# Patient Record
Sex: FEMALE | Race: WHITE | NOT HISPANIC OR LATINO | Employment: OTHER | URBAN - METROPOLITAN AREA
[De-identification: names, ages, dates, MRNs, and addresses within clinical notes are randomized per-mention and may not be internally consistent; named-entity substitution may affect disease eponyms.]

---

## 2017-01-18 ENCOUNTER — RECORDS - HEALTHEAST (OUTPATIENT)
Dept: LAB | Facility: CLINIC | Age: 77
End: 2017-01-18

## 2017-01-18 LAB
CHOLEST SERPL-MCNC: 129 MG/DL
FASTING STATUS PATIENT QL REPORTED: ABNORMAL
HDLC SERPL-MCNC: 37 MG/DL
LDLC SERPL CALC-MCNC: 60 MG/DL
TRIGL SERPL-MCNC: 159 MG/DL

## 2017-03-27 ENCOUNTER — COMMUNICATION - HEALTHEAST (OUTPATIENT)
Dept: PHYSICAL MEDICINE AND REHAB | Facility: CLINIC | Age: 77
End: 2017-03-27

## 2017-03-27 DIAGNOSIS — M48.02 FORAMINAL STENOSIS OF CERVICAL REGION: ICD-10-CM

## 2017-03-27 DIAGNOSIS — M54.2 NECK PAIN: ICD-10-CM

## 2017-03-27 DIAGNOSIS — M50.30 DEGENERATION OF CERVICAL DISC WITHOUT MYELOPATHY: ICD-10-CM

## 2017-03-27 DIAGNOSIS — M79.18 MYOFASCIAL PAIN: ICD-10-CM

## 2017-03-27 DIAGNOSIS — M54.12 CERVICAL RADICULAR PAIN: ICD-10-CM

## 2018-07-18 ENCOUNTER — RECORDS - HEALTHEAST (OUTPATIENT)
Dept: LAB | Facility: CLINIC | Age: 78
End: 2018-07-18

## 2018-07-18 LAB
ALBUMIN SERPL-MCNC: 4.1 G/DL (ref 3.5–5)
ALP SERPL-CCNC: 111 U/L (ref 45–120)
ALT SERPL W P-5'-P-CCNC: 13 U/L (ref 0–45)
ANION GAP SERPL CALCULATED.3IONS-SCNC: 12 MMOL/L (ref 5–18)
AST SERPL W P-5'-P-CCNC: 14 U/L (ref 0–40)
BILIRUB SERPL-MCNC: 1.3 MG/DL (ref 0–1)
BUN SERPL-MCNC: 14 MG/DL (ref 8–28)
CALCIUM SERPL-MCNC: 10 MG/DL (ref 8.5–10.5)
CHLORIDE BLD-SCNC: 102 MMOL/L (ref 98–107)
CHOLEST SERPL-MCNC: 128 MG/DL
CO2 SERPL-SCNC: 22 MMOL/L (ref 22–31)
CREAT SERPL-MCNC: 0.79 MG/DL (ref 0.6–1.1)
FASTING STATUS PATIENT QL REPORTED: ABNORMAL
GFR SERPL CREATININE-BSD FRML MDRD: >60 ML/MIN/1.73M2
GLUCOSE BLD-MCNC: 133 MG/DL (ref 70–125)
HDLC SERPL-MCNC: 39 MG/DL
LDLC SERPL CALC-MCNC: 58 MG/DL
POTASSIUM BLD-SCNC: 4.7 MMOL/L (ref 3.5–5)
PROT SERPL-MCNC: 6.7 G/DL (ref 6–8)
SODIUM SERPL-SCNC: 136 MMOL/L (ref 136–145)
TRIGL SERPL-MCNC: 156 MG/DL
VIT B12 SERPL-MCNC: 965 PG/ML (ref 213–816)

## 2019-04-10 ENCOUNTER — RECORDS - HEALTHEAST (OUTPATIENT)
Dept: ADMINISTRATIVE | Facility: OTHER | Age: 79
End: 2019-04-10

## 2019-04-16 ENCOUNTER — HOSPITAL ENCOUNTER (OUTPATIENT)
Dept: NUCLEAR MEDICINE | Facility: HOSPITAL | Age: 79
Discharge: HOME | End: 2019-04-16
Attending: FAMILY MEDICINE

## 2019-04-16 ENCOUNTER — HOSPITAL ENCOUNTER (OUTPATIENT)
Dept: CARDIOLOGY | Facility: HOSPITAL | Age: 79
Discharge: HOME | End: 2019-04-16
Attending: FAMILY MEDICINE

## 2019-04-16 ENCOUNTER — COMMUNICATION - HEALTHEAST (OUTPATIENT)
Dept: TELEHEALTH | Facility: CLINIC | Age: 79
End: 2019-04-16

## 2019-04-16 DIAGNOSIS — R07.9 CHEST PAIN ON EXERTION: ICD-10-CM

## 2019-04-16 DIAGNOSIS — R06.02 SOB (SHORTNESS OF BREATH): ICD-10-CM

## 2019-04-16 LAB
CV STRESS CURRENT BP HE: NORMAL
CV STRESS CURRENT HR HE: 102
CV STRESS CURRENT HR HE: 103
CV STRESS CURRENT HR HE: 103
CV STRESS CURRENT HR HE: 104
CV STRESS CURRENT HR HE: 104
CV STRESS CURRENT HR HE: 105
CV STRESS CURRENT HR HE: 106
CV STRESS CURRENT HR HE: 106
CV STRESS CURRENT HR HE: 107
CV STRESS CURRENT HR HE: 108
CV STRESS CURRENT HR HE: 108
CV STRESS CURRENT HR HE: 109
CV STRESS CURRENT HR HE: 109
CV STRESS CURRENT HR HE: 80
CV STRESS CURRENT HR HE: 83
CV STRESS CURRENT HR HE: 99
CV STRESS DEVIATION TIME HE: NORMAL
CV STRESS ECHO PERCENT HR HE: NORMAL
CV STRESS EXERCISE STAGE HE: NORMAL
CV STRESS FINAL RESTING BP HE: NORMAL
CV STRESS FINAL RESTING HR HE: 99
CV STRESS MAX HR HE: 109
CV STRESS MAX TREADMILL GRADE HE: 0
CV STRESS MAX TREADMILL SPEED HE: 0
CV STRESS PEAK DIA BP HE: NORMAL
CV STRESS PEAK SYS BP HE: NORMAL
CV STRESS PHASE HE: NORMAL
CV STRESS PROTOCOL HE: NORMAL
CV STRESS RESTING PT POSITION HE: NORMAL
CV STRESS ST DEVIATION AMOUNT HE: NORMAL
CV STRESS ST DEVIATION ELEVATION HE: NORMAL
CV STRESS ST EVELATION AMOUNT HE: NORMAL
CV STRESS TEST TYPE HE: NORMAL
CV STRESS TOTAL STAGE TIME MIN 1 HE: NORMAL
NUC STRESS EJECTION FRACTION: 73 %
STRESS ECHO BASELINE BP: NORMAL
STRESS ECHO BASELINE HR: 81
STRESS ECHO CALCULATED PERCENT HR: 77 %
STRESS ECHO LAST STRESS BP: NORMAL
STRESS ECHO LAST STRESS HR: 107

## 2019-04-16 RX ORDER — BACLOFEN 10 MG/1
10 TABLET ORAL AT BEDTIME
Status: SHIPPED | COMMUNITY
Start: 2019-04-16

## 2019-04-16 RX ORDER — ACETAMINOPHEN 500 MG
1000 TABLET ORAL 2 TIMES DAILY PRN
Status: SHIPPED | COMMUNITY
Start: 2017-08-17

## 2019-04-16 RX ORDER — METFORMIN HCL 500 MG
TABLET, EXTENDED RELEASE 24 HR ORAL
Refills: 1 | Status: SHIPPED | COMMUNITY
Start: 2019-02-21

## 2019-04-16 RX ORDER — ALLOPURINOL 100 MG/1
100 TABLET ORAL DAILY
Status: SHIPPED | COMMUNITY
Start: 2019-04-16

## 2019-04-16 RX ORDER — LORAZEPAM 0.5 MG/1
0.5 TABLET ORAL 2 TIMES DAILY PRN
Status: SHIPPED | COMMUNITY
Start: 2019-04-16

## 2019-04-16 RX ORDER — CELECOXIB 200 MG/1
200 CAPSULE ORAL DAILY
Status: SHIPPED | COMMUNITY
Start: 2019-04-16

## 2019-04-18 ENCOUNTER — RECORDS - HEALTHEAST (OUTPATIENT)
Dept: ADMINISTRATIVE | Facility: OTHER | Age: 79
End: 2019-04-18

## 2019-04-23 ENCOUNTER — OFFICE VISIT - HEALTHEAST (OUTPATIENT)
Dept: CARDIOLOGY | Facility: CLINIC | Age: 79
End: 2019-04-23

## 2019-04-23 DIAGNOSIS — R94.39 ABNORMAL NUCLEAR STRESS TEST: ICD-10-CM

## 2019-04-23 DIAGNOSIS — I10 ESSENTIAL HYPERTENSION: ICD-10-CM

## 2019-04-23 DIAGNOSIS — E78.2 MIXED HYPERLIPIDEMIA: ICD-10-CM

## 2019-04-23 ASSESSMENT — MIFFLIN-ST. JEOR: SCORE: 1057.38

## 2019-05-31 ENCOUNTER — RECORDS - HEALTHEAST (OUTPATIENT)
Dept: ADMINISTRATIVE | Facility: OTHER | Age: 79
End: 2019-05-31

## 2019-05-31 ENCOUNTER — AMBULATORY - HEALTHEAST (OUTPATIENT)
Dept: CARDIOLOGY | Facility: CLINIC | Age: 79
End: 2019-05-31

## 2019-06-03 ENCOUNTER — OFFICE VISIT - HEALTHEAST (OUTPATIENT)
Dept: CARDIOLOGY | Facility: CLINIC | Age: 79
End: 2019-06-03

## 2019-06-03 DIAGNOSIS — R94.39 ABNORMAL NUCLEAR STRESS TEST: ICD-10-CM

## 2019-06-03 DIAGNOSIS — I25.84 CORONARY ARTERY DISEASE DUE TO CALCIFIED CORONARY LESION: ICD-10-CM

## 2019-06-03 DIAGNOSIS — I10 ESSENTIAL HYPERTENSION: ICD-10-CM

## 2019-06-03 DIAGNOSIS — E78.2 MIXED HYPERLIPIDEMIA: ICD-10-CM

## 2019-06-03 DIAGNOSIS — I25.10 CORONARY ARTERY DISEASE DUE TO CALCIFIED CORONARY LESION: ICD-10-CM

## 2019-06-03 ASSESSMENT — MIFFLIN-ST. JEOR: SCORE: 1044.68

## 2020-07-28 ENCOUNTER — COMMUNICATION - HEALTHEAST (OUTPATIENT)
Dept: SCHEDULING | Facility: CLINIC | Age: 80
End: 2020-07-28

## 2020-07-31 ENCOUNTER — RECORDS - HEALTHEAST (OUTPATIENT)
Dept: LAB | Facility: CLINIC | Age: 80
End: 2020-07-31

## 2020-07-31 LAB
ALBUMIN SERPL-MCNC: 4.3 G/DL (ref 3.5–5)
ALP SERPL-CCNC: 94 U/L (ref 45–120)
ALT SERPL W P-5'-P-CCNC: 17 U/L (ref 0–45)
ANION GAP SERPL CALCULATED.3IONS-SCNC: 11 MMOL/L (ref 5–18)
AST SERPL W P-5'-P-CCNC: 17 U/L (ref 0–40)
BILIRUB SERPL-MCNC: 1.8 MG/DL (ref 0–1)
BUN SERPL-MCNC: 10 MG/DL (ref 8–28)
CALCIUM SERPL-MCNC: 10.2 MG/DL (ref 8.5–10.5)
CHLORIDE BLD-SCNC: 103 MMOL/L (ref 98–107)
CHOLEST SERPL-MCNC: 121 MG/DL
CO2 SERPL-SCNC: 23 MMOL/L (ref 22–31)
CREAT SERPL-MCNC: 0.77 MG/DL (ref 0.6–1.1)
FASTING STATUS PATIENT QL REPORTED: ABNORMAL
GFR SERPL CREATININE-BSD FRML MDRD: >60 ML/MIN/1.73M2
GLUCOSE BLD-MCNC: 125 MG/DL (ref 70–125)
HDLC SERPL-MCNC: 42 MG/DL
LDLC SERPL CALC-MCNC: 57 MG/DL
POTASSIUM BLD-SCNC: 4.4 MMOL/L (ref 3.5–5)
PROT SERPL-MCNC: 6.7 G/DL (ref 6–8)
SODIUM SERPL-SCNC: 137 MMOL/L (ref 136–145)
TRIGL SERPL-MCNC: 108 MG/DL
VIT B12 SERPL-MCNC: 1100 PG/ML (ref 213–816)

## 2021-02-10 ENCOUNTER — RECORDS - HEALTHEAST (OUTPATIENT)
Dept: LAB | Facility: CLINIC | Age: 81
End: 2021-02-10

## 2021-02-12 LAB — BACTERIA SPEC CULT: NORMAL

## 2021-05-02 ENCOUNTER — HEALTH MAINTENANCE LETTER (OUTPATIENT)
Age: 81
End: 2021-05-02

## 2021-05-26 ENCOUNTER — RECORDS - HEALTHEAST (OUTPATIENT)
Dept: ADMINISTRATIVE | Facility: CLINIC | Age: 81
End: 2021-05-26

## 2021-05-26 ENCOUNTER — RECORDS - HEALTHEAST (OUTPATIENT)
Dept: LAB | Facility: CLINIC | Age: 81
End: 2021-05-26

## 2021-05-26 LAB
ALBUMIN SERPL-MCNC: 4.1 G/DL (ref 3.5–5)
ALP SERPL-CCNC: 87 U/L (ref 45–120)
ALT SERPL W P-5'-P-CCNC: 14 U/L (ref 0–45)
ANION GAP SERPL CALCULATED.3IONS-SCNC: 11 MMOL/L (ref 5–18)
AST SERPL W P-5'-P-CCNC: 16 U/L (ref 0–40)
BASOPHILS # BLD AUTO: 0.1 THOU/UL (ref 0–0.2)
BASOPHILS NFR BLD AUTO: 1 % (ref 0–2)
BILIRUB SERPL-MCNC: 2.4 MG/DL (ref 0–1)
BNP SERPL-MCNC: 46 PG/ML (ref 0–155)
BUN SERPL-MCNC: 11 MG/DL (ref 8–28)
CALCIUM SERPL-MCNC: 9.7 MG/DL (ref 8.5–10.5)
CHLORIDE BLD-SCNC: 105 MMOL/L (ref 98–107)
CO2 SERPL-SCNC: 23 MMOL/L (ref 22–31)
CREAT SERPL-MCNC: 0.75 MG/DL (ref 0.6–1.1)
EOSINOPHIL # BLD AUTO: 0.2 THOU/UL (ref 0–0.4)
EOSINOPHIL NFR BLD AUTO: 2 % (ref 0–6)
ERYTHROCYTE [DISTWIDTH] IN BLOOD BY AUTOMATED COUNT: 13 % (ref 11–14.5)
GFR SERPL CREATININE-BSD FRML MDRD: >60 ML/MIN/1.73M2
GLUCOSE BLD-MCNC: 147 MG/DL (ref 70–125)
HCT VFR BLD AUTO: 44.1 % (ref 35–47)
HGB BLD-MCNC: 14.8 G/DL (ref 12–16)
IMM GRANULOCYTES # BLD: 0 THOU/UL
IMM GRANULOCYTES NFR BLD: 0 %
LYMPHOCYTES # BLD AUTO: 1.9 THOU/UL (ref 0.8–4.4)
LYMPHOCYTES NFR BLD AUTO: 26 % (ref 20–40)
MCH RBC QN AUTO: 32.2 PG (ref 27–34)
MCHC RBC AUTO-ENTMCNC: 33.6 G/DL (ref 32–36)
MCV RBC AUTO: 96 FL (ref 80–100)
MONOCYTES # BLD AUTO: 0.7 THOU/UL (ref 0–0.9)
MONOCYTES NFR BLD AUTO: 9 % (ref 2–10)
NEUTROPHILS # BLD AUTO: 4.6 THOU/UL (ref 2–7.7)
NEUTROPHILS NFR BLD AUTO: 62 % (ref 50–70)
PLATELET # BLD AUTO: 208 THOU/UL (ref 140–440)
PMV BLD AUTO: 9.5 FL (ref 8.5–12.5)
POTASSIUM BLD-SCNC: 4.1 MMOL/L (ref 3.5–5)
PROT SERPL-MCNC: 6.4 G/DL (ref 6–8)
RBC # BLD AUTO: 4.6 MILL/UL (ref 3.8–5.4)
SODIUM SERPL-SCNC: 139 MMOL/L (ref 136–145)
TSH SERPL DL<=0.005 MIU/L-ACNC: 0.66 UIU/ML (ref 0.3–5)
WBC: 7.4 THOU/UL (ref 4–11)

## 2021-05-27 ENCOUNTER — RECORDS - HEALTHEAST (OUTPATIENT)
Dept: ADMINISTRATIVE | Facility: CLINIC | Age: 81
End: 2021-05-27

## 2021-05-28 ENCOUNTER — RECORDS - HEALTHEAST (OUTPATIENT)
Dept: ADMINISTRATIVE | Facility: CLINIC | Age: 81
End: 2021-05-28

## 2021-05-28 NOTE — PROGRESS NOTES
[facility] Consultation Note    Thank you, Dr. Azar, Dayne BLAKE MD, for asking the [facility] team to see Adilia M Jeaneth in consultation today at our clinic to evaluate abnormal nuclear stress test.      Assessment:   1.  Abnormal nuclear stress test suspect a false positive findings  2.  Mild coronary artery disease by previous coronary angiography 2003  3.  Essential hypertension  4.  Hyperlipidemia     Plan:   Suspect deconditioning and blunted heart rate response to exercise from beta-blocker therapy as the etiology of your dyspnea on exertion and could not elicit any convincing anginal symptoms and am reluctant to proceed with coronary angiography at this time.  We will decrease the patient's atenolol from 100-50 mg once a day and reevaluate her symptoms in 4-6 weeks            Current History:   78-year-old female who had a nuclear stress testing performed in 2003 where she had a small inferior defect noted with normal ejection fraction.  Coronary angiography was subsequently performed 2003 that showed mild disease in her proximal mid left anterior descending artery with intimal plaquing in her circumflex artery with normal right coronary artery.  Over the last month patient reports to some dyspnea on exertion without any associated exertional chest discomfort.  She will occasionally get abdominal fullness to her epigastric area on occasion but not recurrent with her dyspnea on exertion    Patient underwent Lexiscan Cardiolite testing on April 16.  This showed a small area of inferoseptal ischemia with underlying splanchnic artifact suggesting a false positive test.  Her ejection fraction was normal at 73%.  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     Mastoiditis      Osteoarthritis      TMJ (temporomandibular joint disorder)        Past Surgical History:     Past Surgical History:   Procedure Laterality Date     CYST REMOVAL      tongue     HYSTERECTOMY  1996     OOPHORECTOMY Bilateral    Bilateral tendon surgery on both thumbs and right hip replacement     Family History:     Family History   Problem Relation Age of Onset     Heart disease Father      No Medical Problems Mother      No Medical Problems Sister      No Medical Problems Daughter      No Medical Problems Maternal Grandmother      No Medical Problems Maternal Grandfather      No Medical Problems Paternal Grandmother      No Medical Problems Paternal Grandfather      No Medical Problems Maternal Aunt      No Medical Problems Paternal Aunt      BRCA 1/2 Neg Hx      Breast cancer Neg Hx      Cancer Neg Hx      Colon cancer Neg Hx      Endometrial cancer Neg Hx      Ovarian cancer Neg Hx    Brother  of a heart attack    Social History:    reports that she has quit smoking. She has never used smokeless tobacco. She reports that she drinks about 1.0 oz of alcohol per week. She reports that she does not use drugs.  She is  and lives with her     Meds:   Scheduled Meds:  PRN Meds:.    Allergies:   Patient has no known allergies.    Review of Systems:   General: WNL  Eyes: WNL  Ears/Nose/Throat: WNL  Lungs: Shortness of Breath  Heart: Shortness of Breath with activity, Irregular Heartbeat(palpations)  Stomach: Heartburn  Bladder: WNL  Muscle/Joints: WNL  Skin: WNL  Nervous System: Loss of Balance  Mental Health: Anxiety     Blood: Easy Bruising      Objective:      Physical Exam  139 lb 12.8 oz (63.4 kg)  5' 2\" (1.575 m)  Body mass index is 25.57 kg/m .  /80 (Patient Site: Left Arm, Patient Position: Sitting, Cuff Size: Adult Regular)   Pulse (!) 48   Resp 20   Ht 5' 2\" (1.575 m)   Wt " 139 lb 12.8 oz (63.4 kg)   BMI 25.57 kg/m      General Appearance:   alert, no apparent distress   HEENT:  no scleral icterus; the mucous membranes were pink and moist                                  Neck: jugular venous pressure is normal, no thyromegaly   Chest: the spine was straight and the chest was symmetric   Lungs:   respirations unlabored; the lungs are clear to auscultation   Cardiovascular:   regular rhythm with normal first and second heart sounds and no murmurs, clicks, or gallops. Carotid, radial, femoral, and posterior tibial pulses are intact; there are no carotid or femoral bruits.   Abdomen:  no organomegaly, masses, bruits, or tenderness; bowel sounds are present   Extremities: no cyanosis, clubbing, or edema   Skin: no xanthelasma   Neurologic: mood and affect are appropriate         Cardiolite (Tc-99m Sestamibi) stress test from April 16, 2019 results reviewed as above          Lab Review   Lab Results   Component Value Date     07/18/2018    K 4.7 07/18/2018     07/18/2018    CO2 22 07/18/2018    BUN 14 07/18/2018    CREATININE 0.79 07/18/2018    CALCIUM 10.0 07/18/2018     Lab Results   Component Value Date    WBC 9.2 01/18/2017    WBC 8.1 07/17/2014    HGB 13.8 01/18/2017    HCT 41.6 01/18/2017    MCV 94 01/18/2017     01/18/2017     Lab Results   Component Value Date    CHOL 128 07/18/2018    TRIG 156 (H) 07/18/2018    HDL 39 (L) 07/18/2018         Lee Baldwin M.D., F.A.C.C.  [facility]  [phone]

## 2021-05-29 NOTE — PROGRESS NOTES
[facility] Progress Note    Assessment:    1.  Intermittent dyspnea on exertion not felt to be cardiac related  2.  Recent abnormal nuclear stress test felt to be artifactual  3.  Mild coronary artery disease by previous coronary angiography  4.  Essential hypertension    Plan:    At this time would not pursue any further cardiac evaluation or follow-up.  Would consider repeat coronary angiography if patient's symptoms become more progressive and predictable with exertion    An After Visit Summary was printed and given to the patient.    Subjective:    78-year-old of female who had an abnormal nuclear stress stress test with a small area of inferior ischemia leading to coronary angiography in 2003.  She was felt to have only mild coronary artery disease present.  Patient has had dyspnea on exertion intermittently since hip surgery over a year ago.  Her Lexiscan Cardiolite study done on April 16 showed a small area of inferoseptal ischemia but was felt to be artifactual due to splanchic uptake.  Over the last month the patient's atenolol dose was cut in half and she felt well up until a few days ago when she again is developed dyspnea on exertion and some atypical chest discomfort that is not exertionally related    Problem List:    Patient Active Problem List   Diagnosis     Stroke (H)     Anxiety disorder     Gout     Hypertension     Hyperlipidemia     Paresthesia     Generalized anxiety disorder     Panic disorder without agoraphobia     Adjustment disorder with mixed anxiety and depressed mood     Mood disorder in conditions classified elsewhere         Current Outpatient Medications   Medication Sig Dispense Refill     acetaminophen (TYLENOL) 500 MG tablet Take 1,000 mg by mouth as needed.              allopurinol (ZYLOPRIM) 100 MG tablet Take 100 mg by mouth.       aspirin 325 MG tablet Take 325 mg by mouth daily.       atenolol (TENORMIN) 100 MG tablet Take 100 mg by mouth bedtime.        atorvastatin (LIPITOR) 40 MG tablet Take 40 mg by mouth bedtime.       baclofen (LIORESAL) 10 MG tablet Take 10 mg by mouth daily.       calcium carbonate (OS-MARCELO) 600 mg calcium (1,500 mg) tablet Take 1 tablet by mouth.       celecoxib (CELEBREX) 200 MG capsule Take 200 mg by mouth daily.       cyanocobalamin 1000 MCG tablet Take 1,000 mcg by mouth daily.       diclofenac sodium (VOLTAREN) 1 % Gel Apply topically 4 (four) times a day as needed.              diphenhydrAMINE (BENADRYL) 25 mg tablet Take 25 mg by mouth at bedtime.       esomeprazole (NEXIUM) 20 MG capsule Take 20 mg by mouth.       furosemide (LASIX) 40 MG tablet Take 40 mg by mouth daily.       lactobacillus combo no.11 15 billion cell CpSP Take by mouth daily.       LORazepam (ATIVAN) 0.5 MG tablet Take 0.5 mg by mouth.       magnesium oxide (MAG-OX) 400 mg tablet Take 400 mg by mouth daily.       metFORMIN (GLUCOPHAGE-XR) 500 MG 24 hr tablet TAKE 1 TABLET BY MOUTH EVERY DAY FOR DIABETES  1     ibuprofen (ADVIL,MOTRIN) 200 MG tablet Take 400 mg by mouth every 4 (four) hours as needed for pain.       No current facility-administered medications for this visit.        .  Past Surgical History:   Procedure Laterality Date     CYST REMOVAL      tongue     HYSTERECTOMY  1996     OOPHORECTOMY Bilateral 1996       .  Social History     Socioeconomic History     Marital status:      Spouse name: Not on file     Number of children: Not on file     Years of education: Not on file     Highest education level: Not on file   Occupational History     Not on file   Social Needs     Financial resource strain: Not on file     Food insecurity:     Worry: Not on file     Inability: Not on file     Transportation needs:     Medical: Not on file     Non-medical: Not on file   Tobacco Use     Smoking status: Former Smoker     Smokeless tobacco: Never Used   Substance and Sexual Activity     Alcohol use: Yes     Alcohol/week: 1.0 oz     Types: 2 Standard drinks or  "equivalent per week     Drug use: No     Sexual activity: Not on file   Lifestyle     Physical activity:     Days per week: Not on file     Minutes per session: Not on file     Stress: Not on file   Relationships     Social connections:     Talks on phone: Not on file     Gets together: Not on file     Attends [employer] service: Not on file     Active member of club or organization: Not on file     Attends meetings of clubs or organizations: Not on file     Relationship status: Not on file     Intimate partner violence:     Fear of current or ex partner: Not on file     Emotionally abused: Not on file     Physically abused: Not on file     Forced sexual activity: Not on file   Other Topics Concern     Not on file   Social History Narrative     Not on file       .  Family History   Problem Relation Age of Onset     Heart disease Father      No Medical Problems Mother      No Medical Problems Sister      No Medical Problems Daughter      No Medical Problems Maternal Grandmother      No Medical Problems Maternal Grandfather      No Medical Problems Paternal Grandmother      No Medical Problems Paternal Grandfather      No Medical Problems Maternal Aunt      No Medical Problems Paternal Aunt      BRCA 1/2 Neg Hx      Breast cancer Neg Hx      Cancer Neg Hx      Colon cancer Neg Hx      Endometrial cancer Neg Hx      Ovarian cancer Neg Hx      Review of Systems:  General: Weight Loss  Eyes: WNL  Ears/Nose/Throat: WNL  Lungs: Cough, Shortness of Breath  Heart: Shortness of Breath with activity, Irregular Heartbeat  Stomach: WNL  Bladder: WNL  Muscle/Joints: WNL  Skin: WNL  Nervous System: Loss of Balance  Mental Health: Anxiety     Blood: Easy Bruising    Objective:     /74 (Patient Site: Left Arm, Patient Position: Sitting, Cuff Size: Adult Regular)   Pulse 76   Resp 16   Ht 5' 2\" (1.575 m)   Wt 137 lb (62.1 kg)   BMI 25.06 kg/m    137 lb (62.1 kg)   [unfilled]    Physical Exam:    GENERAL APPEARANCE: " alert, no apparent distress  HEENT: no scleral icterus or xanthelasma  NECK: jugular venous pressure normal  CHEST: symmetric, the lungs were clear to auscultation  CARDIOVASCULAR: regular rhythm without murmur, click, or gallop; no carotid bruits  ABDOMEN: nondistended, nontender, bowel sounds present  EXTREMITIES: no cyanosis, clubbing or edema.    Cardiac Testing:    Cardiolite (Tc-99m Sestamibi) stress test from April 16, 2019 results reviewed as above      Lab Results:    LIPIDS:  Lab Results   Component Value Date    CHOL 128 07/18/2018    CHOL 129 01/18/2017    CHOL 273 (H) 11/23/2015     Lab Results   Component Value Date    HDL 39 (L) 07/18/2018    HDL 37 (L) 01/18/2017    HDL 39 (L) 11/23/2015     Lab Results   Component Value Date    LDLCALC 58 07/18/2018    LDLCALC 60 01/18/2017    LDLCALC 186 (H) 11/23/2015     Lab Results   Component Value Date    TRIG 156 (H) 07/18/2018    TRIG 159 (H) 01/18/2017    TRIG 239 (H) 11/23/2015     No components found for: CHOLHDL    BMP:  Lab Results   Component Value Date    CREATININE 0.79 07/18/2018    BUN 14 07/18/2018     07/18/2018    K 4.7 07/18/2018     07/18/2018    CO2 22 07/18/2018         Lee Baldwin MD,F.A.C.C.  [facility]  [phone]

## 2021-06-02 ENCOUNTER — RECORDS - HEALTHEAST (OUTPATIENT)
Dept: ADMINISTRATIVE | Facility: CLINIC | Age: 81
End: 2021-06-02

## 2021-06-03 VITALS — WEIGHT: 139.8 LBS | BODY MASS INDEX: 25.73 KG/M2 | HEIGHT: 62 IN

## 2021-06-03 VITALS — BODY MASS INDEX: 25.21 KG/M2 | HEIGHT: 62 IN | WEIGHT: 137 LBS

## 2021-06-16 PROBLEM — N89.8 VAGINAL DISCHARGE: Status: ACTIVE | Noted: 2020-07-27

## 2021-06-16 PROBLEM — R55 NEAR SYNCOPE: Status: ACTIVE | Noted: 2020-07-26

## 2021-07-13 ENCOUNTER — RECORDS - HEALTHEAST (OUTPATIENT)
Dept: ADMINISTRATIVE | Facility: CLINIC | Age: 81
End: 2021-07-13

## 2021-07-21 ENCOUNTER — RECORDS - HEALTHEAST (OUTPATIENT)
Dept: ADMINISTRATIVE | Facility: CLINIC | Age: 81
End: 2021-07-21

## 2021-07-22 ENCOUNTER — RECORDS - HEALTHEAST (OUTPATIENT)
Dept: SCHEDULING | Facility: CLINIC | Age: 81
End: 2021-07-22

## 2021-07-22 DIAGNOSIS — Z12.31 OTHER SCREENING MAMMOGRAM: ICD-10-CM

## 2021-10-17 ENCOUNTER — HEALTH MAINTENANCE LETTER (OUTPATIENT)
Age: 81
End: 2021-10-17

## 2021-12-15 ENCOUNTER — LAB REQUISITION (OUTPATIENT)
Dept: LAB | Facility: CLINIC | Age: 81
End: 2021-12-15

## 2021-12-15 DIAGNOSIS — E78.2 MIXED HYPERLIPIDEMIA: ICD-10-CM

## 2021-12-15 DIAGNOSIS — R42 DIZZINESS AND GIDDINESS: ICD-10-CM

## 2021-12-15 DIAGNOSIS — I10 ESSENTIAL (PRIMARY) HYPERTENSION: ICD-10-CM

## 2021-12-15 DIAGNOSIS — E11.9 TYPE 2 DIABETES MELLITUS WITHOUT COMPLICATIONS (H): ICD-10-CM

## 2021-12-15 LAB
ALBUMIN SERPL-MCNC: 4.5 G/DL (ref 3.5–5)
ALP SERPL-CCNC: 90 U/L (ref 45–120)
ALT SERPL W P-5'-P-CCNC: 17 U/L (ref 0–45)
ANION GAP SERPL CALCULATED.3IONS-SCNC: 12 MMOL/L (ref 5–18)
AST SERPL W P-5'-P-CCNC: 21 U/L (ref 0–40)
BASOPHILS # BLD AUTO: 0.1 10E3/UL (ref 0–0.2)
BASOPHILS NFR BLD AUTO: 1 %
BILIRUB SERPL-MCNC: 2.5 MG/DL (ref 0–1)
BUN SERPL-MCNC: 11 MG/DL (ref 8–28)
CALCIUM SERPL-MCNC: 10.8 MG/DL (ref 8.5–10.5)
CHLORIDE BLD-SCNC: 101 MMOL/L (ref 98–107)
CO2 SERPL-SCNC: 23 MMOL/L (ref 22–31)
CORTIS SERPL-MCNC: 15.9 UG/DL
CREAT SERPL-MCNC: 0.78 MG/DL (ref 0.6–1.1)
EOSINOPHIL # BLD AUTO: 0.2 10E3/UL (ref 0–0.7)
EOSINOPHIL NFR BLD AUTO: 2 %
ERYTHROCYTE [DISTWIDTH] IN BLOOD BY AUTOMATED COUNT: 12.6 % (ref 10–15)
GFR SERPL CREATININE-BSD FRML MDRD: 72 ML/MIN/1.73M2
GLUCOSE BLD-MCNC: 126 MG/DL (ref 70–125)
HBA1C MFR BLD: 5.9 %
HCT VFR BLD AUTO: 48.7 % (ref 35–47)
HGB BLD-MCNC: 16.2 G/DL (ref 11.7–15.7)
IMM GRANULOCYTES # BLD: 0 10E3/UL
IMM GRANULOCYTES NFR BLD: 1 %
LYMPHOCYTES # BLD AUTO: 1.9 10E3/UL (ref 0.8–5.3)
LYMPHOCYTES NFR BLD AUTO: 22 %
MCH RBC QN AUTO: 32 PG (ref 26.5–33)
MCHC RBC AUTO-ENTMCNC: 33.3 G/DL (ref 31.5–36.5)
MCV RBC AUTO: 96 FL (ref 78–100)
MONOCYTES # BLD AUTO: 0.6 10E3/UL (ref 0–1.3)
MONOCYTES NFR BLD AUTO: 7 %
NEUTROPHILS # BLD AUTO: 5.8 10E3/UL (ref 1.6–8.3)
NEUTROPHILS NFR BLD AUTO: 67 %
NRBC # BLD AUTO: 0 10E3/UL
NRBC BLD AUTO-RTO: 0 /100
PLATELET # BLD AUTO: 236 10E3/UL (ref 150–450)
POTASSIUM BLD-SCNC: 4.3 MMOL/L (ref 3.5–5)
PROT SERPL-MCNC: 7.1 G/DL (ref 6–8)
RBC # BLD AUTO: 5.06 10E6/UL (ref 3.8–5.2)
SODIUM SERPL-SCNC: 136 MMOL/L (ref 136–145)
TSH SERPL DL<=0.005 MIU/L-ACNC: 0.64 UIU/ML (ref 0.3–5)
WBC # BLD AUTO: 8.6 10E3/UL (ref 4–11)

## 2021-12-15 PROCEDURE — 82533 TOTAL CORTISOL: CPT | Performed by: FAMILY MEDICINE

## 2021-12-15 PROCEDURE — 84443 ASSAY THYROID STIM HORMONE: CPT | Performed by: FAMILY MEDICINE

## 2021-12-15 PROCEDURE — 85025 COMPLETE CBC W/AUTO DIFF WBC: CPT | Performed by: FAMILY MEDICINE

## 2021-12-15 PROCEDURE — 82024 ASSAY OF ACTH: CPT | Performed by: FAMILY MEDICINE

## 2021-12-15 PROCEDURE — 83036 HEMOGLOBIN GLYCOSYLATED A1C: CPT | Performed by: FAMILY MEDICINE

## 2021-12-15 PROCEDURE — 80053 COMPREHEN METABOLIC PANEL: CPT | Performed by: FAMILY MEDICINE

## 2021-12-16 LAB — ACTH PLAS-MCNC: 27 PG/ML

## 2022-01-11 ENCOUNTER — LAB REQUISITION (OUTPATIENT)
Dept: LAB | Facility: CLINIC | Age: 82
End: 2022-01-11

## 2022-01-11 DIAGNOSIS — E83.52 HYPERCALCEMIA: ICD-10-CM

## 2022-01-11 PROCEDURE — 84100 ASSAY OF PHOSPHORUS: CPT | Performed by: FAMILY MEDICINE

## 2022-01-11 PROCEDURE — 80053 COMPREHEN METABOLIC PANEL: CPT | Performed by: FAMILY MEDICINE

## 2022-01-12 LAB
ALBUMIN SERPL-MCNC: 4.4 G/DL (ref 3.5–5)
ALP SERPL-CCNC: 98 U/L (ref 45–120)
ALT SERPL W P-5'-P-CCNC: 16 U/L (ref 0–45)
ANION GAP SERPL CALCULATED.3IONS-SCNC: 12 MMOL/L (ref 5–18)
AST SERPL W P-5'-P-CCNC: 18 U/L (ref 0–40)
BILIRUB SERPL-MCNC: 2 MG/DL (ref 0–1)
BUN SERPL-MCNC: 13 MG/DL (ref 8–28)
CALCIUM SERPL-MCNC: 10.4 MG/DL (ref 8.5–10.5)
CALCIUM SERPL-MCNC: 10.4 MG/DL (ref 8.5–10.5)
CHLORIDE BLD-SCNC: 102 MMOL/L (ref 98–107)
CO2 SERPL-SCNC: 23 MMOL/L (ref 22–31)
CREAT SERPL-MCNC: 0.86 MG/DL (ref 0.6–1.1)
CREAT SERPL-MCNC: 0.87 MG/DL (ref 0.6–1.1)
GFR SERPL CREATININE-BSD FRML MDRD: 67 ML/MIN/1.73M2
GFR SERPL CREATININE-BSD FRML MDRD: 67 ML/MIN/1.73M2
GLUCOSE BLD-MCNC: 123 MG/DL (ref 70–125)
PHOSPHATE SERPL-MCNC: 3.2 MG/DL (ref 2.5–4.5)
POTASSIUM BLD-SCNC: 4.2 MMOL/L (ref 3.5–5)
PROT SERPL-MCNC: 7 G/DL (ref 6–8)
PTH-INTACT SERPL-MCNC: 103 PG/ML (ref 10–86)
SODIUM SERPL-SCNC: 137 MMOL/L (ref 136–145)

## 2022-04-02 ENCOUNTER — HEALTH MAINTENANCE LETTER (OUTPATIENT)
Age: 82
End: 2022-04-02

## 2022-04-28 ENCOUNTER — HOSPITAL ENCOUNTER (EMERGENCY)
Facility: HOSPITAL | Age: 82
Discharge: HOME | End: 2022-04-28
Attending: EMERGENCY MEDICINE | Admitting: EMERGENCY MEDICINE
Payer: COMMERCIAL

## 2022-04-28 ENCOUNTER — APPOINTMENT (OUTPATIENT)
Dept: RADIOLOGY | Facility: HOSPITAL | Age: 82
End: 2022-04-28
Attending: EMERGENCY MEDICINE
Payer: COMMERCIAL

## 2022-04-28 VITALS
TEMPERATURE: 99.5 F | BODY MASS INDEX: 24 KG/M2 | DIASTOLIC BLOOD PRESSURE: 92 MMHG | HEART RATE: 89 BPM | RESPIRATION RATE: 16 BRPM | OXYGEN SATURATION: 95 % | WEIGHT: 127 LBS | SYSTOLIC BLOOD PRESSURE: 145 MMHG

## 2022-04-28 DIAGNOSIS — I15.9 SECONDARY HYPERTENSION: ICD-10-CM

## 2022-04-28 LAB
ALBUMIN SERPL-MCNC: 4.4 G/DL (ref 3.5–5)
ALP SERPL-CCNC: 98 U/L (ref 45–120)
ALT SERPL W P-5'-P-CCNC: 19 U/L (ref 0–45)
ANION GAP SERPL CALCULATED.3IONS-SCNC: 11 MMOL/L (ref 5–18)
AST SERPL W P-5'-P-CCNC: 20 U/L (ref 0–40)
ATRIAL RATE - MUSE: 89 BPM
BASOPHILS # BLD AUTO: 0.1 10E3/UL (ref 0–0.2)
BASOPHILS NFR BLD AUTO: 1 %
BILIRUB DIRECT SERPL-MCNC: 0.5 MG/DL
BILIRUB SERPL-MCNC: 2.2 MG/DL (ref 0–1)
BNP SERPL-MCNC: 53 PG/ML (ref 0–159)
BUN SERPL-MCNC: 12 MG/DL (ref 8–28)
CALCIUM SERPL-MCNC: 9.9 MG/DL (ref 8.5–10.5)
CHLORIDE BLD-SCNC: 100 MMOL/L (ref 98–107)
CO2 SERPL-SCNC: 23 MMOL/L (ref 22–31)
CREAT SERPL-MCNC: 0.75 MG/DL (ref 0.6–1.1)
DIASTOLIC BLOOD PRESSURE - MUSE: NORMAL MMHG
EOSINOPHIL # BLD AUTO: 0.2 10E3/UL (ref 0–0.7)
EOSINOPHIL NFR BLD AUTO: 2 %
ERYTHROCYTE [DISTWIDTH] IN BLOOD BY AUTOMATED COUNT: 12.5 % (ref 10–15)
FLUAV RNA SPEC QL NAA+PROBE: NEGATIVE
FLUBV RNA RESP QL NAA+PROBE: NEGATIVE
GFR SERPL CREATININE-BSD FRML MDRD: 80 ML/MIN/1.73M2
GLUCOSE BLD-MCNC: 120 MG/DL (ref 70–125)
HCT VFR BLD AUTO: 45.6 % (ref 35–47)
HGB BLD-MCNC: 15.5 G/DL (ref 11.7–15.7)
IMM GRANULOCYTES # BLD: 0.1 10E3/UL
IMM GRANULOCYTES NFR BLD: 1 %
INTERPRETATION ECG - MUSE: NORMAL
LYMPHOCYTES # BLD AUTO: 1.7 10E3/UL (ref 0.8–5.3)
LYMPHOCYTES NFR BLD AUTO: 21 %
MCH RBC QN AUTO: 32.3 PG (ref 26.5–33)
MCHC RBC AUTO-ENTMCNC: 34 G/DL (ref 31.5–36.5)
MCV RBC AUTO: 95 FL (ref 78–100)
MONOCYTES # BLD AUTO: 0.6 10E3/UL (ref 0–1.3)
MONOCYTES NFR BLD AUTO: 8 %
NEUTROPHILS # BLD AUTO: 5.6 10E3/UL (ref 1.6–8.3)
NEUTROPHILS NFR BLD AUTO: 67 %
NRBC # BLD AUTO: 0 10E3/UL
NRBC BLD AUTO-RTO: 0 /100
P AXIS - MUSE: 61 DEGREES
PLATELET # BLD AUTO: 232 10E3/UL (ref 150–450)
POTASSIUM BLD-SCNC: 4 MMOL/L (ref 3.5–5)
PR INTERVAL - MUSE: 166 MS
PROT SERPL-MCNC: 7.2 G/DL (ref 6–8)
QRS DURATION - MUSE: 70 MS
QT - MUSE: 332 MS
QTC - MUSE: 403 MS
R AXIS - MUSE: -29 DEGREES
RBC # BLD AUTO: 4.8 10E6/UL (ref 3.8–5.2)
SARS-COV-2 RNA RESP QL NAA+PROBE: NEGATIVE
SODIUM SERPL-SCNC: 134 MMOL/L (ref 136–145)
SYSTOLIC BLOOD PRESSURE - MUSE: NORMAL MMHG
T AXIS - MUSE: 31 DEGREES
TROPONIN I SERPL-MCNC: <0.01 NG/ML (ref 0–0.29)
VENTRICULAR RATE- MUSE: 89 BPM
WBC # BLD AUTO: 8.2 10E3/UL (ref 4–11)

## 2022-04-28 PROCEDURE — 85025 COMPLETE CBC W/AUTO DIFF WBC: CPT | Performed by: EMERGENCY MEDICINE

## 2022-04-28 PROCEDURE — 93005 ELECTROCARDIOGRAM TRACING: CPT | Performed by: EMERGENCY MEDICINE

## 2022-04-28 PROCEDURE — 83880 ASSAY OF NATRIURETIC PEPTIDE: CPT | Performed by: EMERGENCY MEDICINE

## 2022-04-28 PROCEDURE — 87636 SARSCOV2 & INF A&B AMP PRB: CPT | Performed by: EMERGENCY MEDICINE

## 2022-04-28 PROCEDURE — 71046 X-RAY EXAM CHEST 2 VIEWS: CPT

## 2022-04-28 PROCEDURE — 82248 BILIRUBIN DIRECT: CPT | Performed by: EMERGENCY MEDICINE

## 2022-04-28 PROCEDURE — 250N000013 HC RX MED GY IP 250 OP 250 PS 637: Performed by: EMERGENCY MEDICINE

## 2022-04-28 PROCEDURE — 36415 COLL VENOUS BLD VENIPUNCTURE: CPT | Performed by: EMERGENCY MEDICINE

## 2022-04-28 PROCEDURE — 82310 ASSAY OF CALCIUM: CPT | Performed by: EMERGENCY MEDICINE

## 2022-04-28 PROCEDURE — 99285 EMERGENCY DEPT VISIT HI MDM: CPT | Mod: 25

## 2022-04-28 PROCEDURE — 84484 ASSAY OF TROPONIN QUANT: CPT | Performed by: EMERGENCY MEDICINE

## 2022-04-28 RX ORDER — ATENOLOL 25 MG/1
50 TABLET ORAL ONCE
Status: COMPLETED | OUTPATIENT
Start: 2022-04-28 | End: 2022-04-28

## 2022-04-28 RX ADMIN — ATENOLOL 50 MG: 25 TABLET ORAL at 16:51

## 2022-04-28 NOTE — ED PROVIDER NOTES
"EMERGENCY DEPARTMENT ENCOUNTER            IMPRESSION:  Hypertension      MEDICAL DECISION MAKING:  Patient evaluated for symptomatic hypertension.  She has a history of hypertension and is followed outpatient by Dr. Harrison.  She takes Tenormin 50 mg twice a day.  She has had some vague nonspecific symptoms.    On exam her blood pressure is elevated.  Her vital signs are otherwise normal.  She has a normal exam.    EKG is normal    Labs including CBC, troponin, BNP are normal    Chemistry reveals slightly low sodium at 134    Patient has no evidence of endorgan injury spoke with the primary care.  He recommended adding an additional dose of Tenormin 50 mg twice a day.  Patient was given a second dose in the emergency department    Her blood pressure has trended downward.    She is stable for discharge home and outpatient follow-up        =================================================================  CHIEF COMPLAINT:  Chief Complaint   Patient presents with     Hypertension         HPI  Adilia Eric is a 81 year old female with a history of sciatica, stroke, type 2 diabetes, hypertension, hyperlipidemia,  who presents to the ED by private car for evaluation of hypertension.     Patient is here with son. Patient had high blood pressure today, and is currently on atenolol. Patient last took it yesterday night. Patient says that she has been feeling \"funny\" the past few days. She feels pressure on eyes/nose, and has an on/off headache, as well as a little shortness of breath. Patient also notes that her groin pain has been worse today. Patient was baking this morning when she started feeling worse. She went to [facility] and her blood pressure was high. She has been eating ok. Patient denies any vomiting. Patient took 2 tylenol today.     I, Chris Pecae am serving as a scribe to document services personally performed by Dr. Earnest Encarnacion MD, based on my observation and the provider's statements to me. I, Dr. Earnest Encarnacion, " MD attest that Chris Peace is acting in a scribe capacity, has observed my performance of the services and has documented them in accordance with my direction.      REVIEW OF SYSTEMS   Constitutional: Denies fever, chills, unintentional weight loss or fatigue   Eyes: Denies visual changes or discharge    HENT: Denies sore throat, ear pain or neck pain  Respiratory: Denies cough. Endorses mild shortness of breath.   Cardiovascular: Denies chest pain, palpitations or leg swelling  GI: Denies abdominal pain, nausea, vomiting, or dark, bloody stools.    : Denies hematuria, dysuria, or flank pain  Musculoskeletal: Denies any new back pain or new muscle/joint pains  Skin: Denies rash or wound  Neurologic: Denies new weakness, focal weakness, or sensory changes. Endorses slight headache.   Lymphatic: Denies swollen glands    Psychiatric: Denies depression, suicidal ideation or homicidal ideation.    Remainder of systems reviewed, unless noted in HPI all others negative.      PAST MEDICAL HISTORY:  Past Medical History:   Diagnosis Date     Depression      Diabetes mellitus (H)      GERD (gastroesophageal reflux disease)      Hyperlipemia      Hypertension      Intracranial hemorrhage (H) 2011    may be amyloid angiopathy; has been stable.     Mastoiditis 2011     Osteoarthritis      TMJ (temporomandibular joint disorder)        PAST SURGICAL HISTORY:  Past Surgical History:   Procedure Laterality Date     CYST REMOVAL      tongue     HYSTERECTOMY  1996     OOPHORECTOMY Bilateral 1996         CURRENT MEDICATIONS:    acetaminophen (TYLENOL) 500 MG tablet  allopurinol (ZYLOPRIM) 100 MG tablet  aspirin 325 MG tablet  atenoloL (TENORMIN) 50 MG tablet  atorvastatin (LIPITOR) 40 MG tablet  baclofen (LIORESAL) 10 MG tablet  calcium carbonate (OS-MARCELO) 600 mg calcium (1,500 mg) tablet  celecoxib (CELEBREX) 200 MG capsule  cyanocobalamin 1000 MCG tablet  diclofenac sodium (VOLTAREN) 1 % Gel  diphenhydrAMINE (BENADRYL) 25 mg  tablet  ibuprofen (ADVIL,MOTRIN) 200 MG tablet  lactobacillus combo no.11 15 billion cell CpSP  LORazepam (ATIVAN) 0.5 MG tablet  magnesium oxide (MAG-OX) 400 mg tablet  metFORMIN (GLUCOPHAGE-XR) 500 MG 24 hr tablet        ALLERGIES:  No Known Allergies    FAMILY HISTORY:  Family History   Problem Relation Age of Onset     Heart Disease Father      No Known Problems Mother      No Known Problems Sister      No Known Problems Daughter      No Known Problems Maternal Grandmother      No Known Problems Maternal Grandfather      No Known Problems Paternal Grandmother      No Known Problems Paternal Grandfather      No Known Problems Maternal Aunt      No Known Problems Paternal Aunt      Hereditary Breast and Ovarian Cancer Syndrome No family hx of      Breast Cancer No family hx of      Cancer No family hx of      Colon Cancer No family hx of      Endometrial Cancer No family hx of      Ovarian Cancer No family hx of        SOCIAL HISTORY:   Social History     Socioeconomic History     Marital status:    Tobacco Use     Smoking status: Former Smoker     Smokeless tobacco: Never Used   Substance and Sexual Activity     Alcohol use: Yes     Alcohol/week: 1.7 standard drinks     Drug use: No       PHYSICAL EXAM:    BP (!) 158/92   Pulse 91   Temp 99.5  F (37.5  C)   Resp 16   Wt 57.6 kg (127 lb)   SpO2 95%   BMI 24.00 kg/m      Constitutional: Awake, alert, in no acute distress  Head: Normocephalic, atraumatic.  ENT: Mucous membranes moist. Posterior oropharynx appears normal.  Eyes: Pupils midrange and reactive ,no conjunctival discharge  Neck: No lymphadenopathy, no stridor, supple, no soft tissue swelling  Chest: No tenderness   Respiratory: Respirations even, unlabored. Lungs clear to ascultation bilaterally, in no acute respiratory distress.  Cardiovascular: Regular rate and rhythm.+2 radial pulses, equal bilaterally.  No murmurs.   GI: Abdomen soft, non-tender to palpation in all 4 quadrants. No  guarding or rebound. Bowel sounds intact on all 4 quadrants.   Back: No CVA tenderness.    Musculoskeletal: Moves all 4 extremities equally, strength symmetrical on bilateral uppers and lowers.  No peripheral edema  Integument: Warm, dry. No rash. No bruising or petechiae.  Lymphatic: No cervical lymphadenopathy  Neurologic: Alert & oriented x 3. Normal speech. Grossly normal motor and sensory function. No focal deficits noted.  NIHSS = 0  Psychiatric: Normal mood and affect. Normal judgement.    ED COURSE:    3:52 PM I met with the patient, obtained history, performed an initial exam, and discussed options and plan for diagnostics and treatment here in the ED.  4:13 PM Consulted with Ivelisse Henning.      LAB:  All pertinent labs reviewed and interpreted.  Results for orders placed or performed during the hospital encounter of 04/28/22   XR Chest 2 Views    Impression    IMPRESSION: Mildly hyperexpanded lungs. Minimal streaky basilar atelectasis or scarring. Remaining lungs are clear. No pleural effusion or pneumothorax. Normal heart size. Mildly tortuous and atherosclerotic aorta. Bony demineralization and degenerative   changes.       Basic metabolic panel   Result Value Ref Range    Sodium 134 (L) 136 - 145 mmol/L    Potassium 4.0 3.5 - 5.0 mmol/L    Chloride 100 98 - 107 mmol/L    Carbon Dioxide (CO2) 23 22 - 31 mmol/L    Anion Gap 11 5 - 18 mmol/L    Urea Nitrogen 12 8 - 28 mg/dL    Creatinine 0.75 0.60 - 1.10 mg/dL    Calcium 9.9 8.5 - 10.5 mg/dL    Glucose 120 70 - 125 mg/dL    GFR Estimate 80 >60 mL/min/1.73m2   Result Value Ref Range    Troponin I <0.01 0.00 - 0.29 ng/mL   Hepatic function panel   Result Value Ref Range    Bilirubin Total 2.2 (H) 0.0 - 1.0 mg/dL    Bilirubin Direct 0.5 <=0.5 mg/dL    Protein Total 7.2 6.0 - 8.0 g/dL    Albumin 4.4 3.5 - 5.0 g/dL    Alkaline Phosphatase 98 45 - 120 U/L    AST 20 0 - 40 U/L    ALT 19 0 - 45 U/L   B-Type Natriuretic Peptide ([facility] Only)   Result Value Ref  Range    BNP 53 0 - 159 pg/mL   CBC with platelets and differential   Result Value Ref Range    WBC Count 8.2 4.0 - 11.0 10e3/uL    RBC Count 4.80 3.80 - 5.20 10e6/uL    Hemoglobin 15.5 11.7 - 15.7 g/dL    Hematocrit 45.6 35.0 - 47.0 %    MCV 95 78 - 100 fL    MCH 32.3 26.5 - 33.0 pg    MCHC 34.0 31.5 - 36.5 g/dL    RDW 12.5 10.0 - 15.0 %    Platelet Count 232 150 - 450 10e3/uL    % Neutrophils 67 %    % Lymphocytes 21 %    % Monocytes 8 %    % Eosinophils 2 %    % Basophils 1 %    % Immature Granulocytes 1 %    NRBCs per 100 WBC 0 <1 /100    Absolute Neutrophils 5.6 1.6 - 8.3 10e3/uL    Absolute Lymphocytes 1.7 0.8 - 5.3 10e3/uL    Absolute Monocytes 0.6 0.0 - 1.3 10e3/uL    Absolute Eosinophils 0.2 0.0 - 0.7 10e3/uL    Absolute Basophils 0.1 0.0 - 0.2 10e3/uL    Absolute Immature Granulocytes 0.1 <=0.4 10e3/uL    Absolute NRBCs 0.0 10e3/uL       RADIOLOGY:  Reviewed all pertinent imaging. Please see official radiology report.  XR Chest 2 Views   Final Result   IMPRESSION: Mildly hyperexpanded lungs. Minimal streaky basilar atelectasis or scarring. Remaining lungs are clear. No pleural effusion or pneumothorax. Normal heart size. Mildly tortuous and atherosclerotic aorta. Bony demineralization and degenerative    changes.                 EKG:    Impressions: Sinus rhythm, normal ECG. No significant change compared to 1/13/21.   Vent rate: 89 bpm  IN interval: 166 ms  QRS interval: 70 ms  QT/QTc: 332/403 ms   Axis: 61 -29 31           I have independently reviewed and interpreted the EKG(s) documented above.        MEDICATIONS GIVEN IN THE EMERGENCY:  Medications   atenolol (TENORMIN) tablet 50 mg (50 mg Oral Given 4/28/22 7221)           NEW PRESCRIPTIONS STARTED AT TODAY'S ER VISIT:  New Prescriptions    No medications on file          FINAL DIAGNOSIS:    ICD-10-CM    1. Secondary hypertension  I15.9             At the conclusion of the encounter I discussed the results of all of the tests and the disposition. The  questions were answered. The patient or family acknowledged understanding and was agreeable with the care plan.     NAME: Adilia Eric  AGE: 81 year old female  YOB: 1940  MRN: [id number]  EVALUATION DATE & TIME: No admission date for patient encounter.    PCP: Dayne Aazr    ED PROVIDER: Earnest Encarnacion M.D.      I, Chris Peace, am serving as a scribe to document services personally performed by Dr. Earnest Encarnacion based on my observation and the provider's statements to me. I, Earnest Encarnacion MD attest that Chris Peace is acting in a scribe capacity, has observed my performance of the services and has documented them in accordance with my direction.    Earnest Encarnacion M.D.  Emergency Medicine  [facility] EMERGENCY DEPARTMENT  [facility] [facility] [address]  [phone]  Dept: [phone]  4/28/2022         Earnest Encarnacion MD  04/28/22 2453

## 2022-04-28 NOTE — ED PROVIDER NOTES
"ED Provider In Triage Note  [facility]  Encounter Date: Apr 28, 2022    No chief complaint on file.      Brief HPI:   Adilia Eric is a 81 year old female presenting to the Emergency Department with a chief complaint of high blood pressure.  Pt reports \"not feeling right\", so she went with neighbor to [facility] to get BP checked.  Pt reports ongoing anxiety issues, head pressure, which have been worse this past week.  Pt does report dyspnea as well, but no CP.  BP at [facility] was 220/120.  Pt uncertain what her baseline BP is, but is treated for HTN.  As a result, she came to ED.      Brief Physical Exam:  There were no vitals taken for this visit.  General: Non-toxic appearing  HEENT: Atraumatic  Resp: No respiratory distress  Abdomen: Non-peritoneal  Neuro: Alert, oriented, answers questions appropriately  Psych: Behavior appropriate    Plan Initiated in Triage:  Labs, imaging      PIT Dispo:   Return to lobby while awaiting workup and ED bed availability    Malachi Grey MD on 4/28/2022 at 1:52 PM    Patient was evaluated by the Physician in Triage due to a limitation of available rooms in the Emergency Department. A plan of care was discussed based on the information obtained on the initial evaluation and patient was consuled to return back to the Emergency Department lobby after this initial evalutaiton until results were obtained or a room became available in the Emergency Department. Patient was counseled not to leave prior to receiving the results of their workup.     Malachi Grey MD  [facility] EMERGENCY DEPARTMENT  [address] [place] [id number]  [phone]      Malachi Grey MD  04/28/22 1355    "

## 2022-04-28 NOTE — DISCHARGE INSTRUCTIONS
Your x-rays and lab work in the emergency department did not show anything serious.  You were treated for high blood pressure.  I spoke with Dr. Harrison.  He recommends you start taking a second dose of your Tenormin or atenolol.  Take 50 mg in the morning and 50 mg at night.  See your doctor early next week for a checkup

## 2022-04-28 NOTE — ED TRIAGE NOTES
"Pt amb with cane into triage. She notes \" not feeling\" like herself this morning and called her neighbor who brought her to [facility] to check her BP. It was 200/120. She takes her BP med at night. She notes feeling anxious, last dosed with ativan at 0730 today.      "

## 2022-05-05 ENCOUNTER — APPOINTMENT (OUTPATIENT)
Dept: CT IMAGING | Facility: HOSPITAL | Age: 82
End: 2022-05-05
Attending: FAMILY MEDICINE
Payer: COMMERCIAL

## 2022-05-05 ENCOUNTER — HOSPITAL ENCOUNTER (EMERGENCY)
Facility: HOSPITAL | Age: 82
Discharge: HOME | End: 2022-05-05
Attending: FAMILY MEDICINE | Admitting: FAMILY MEDICINE
Payer: COMMERCIAL

## 2022-05-05 DIAGNOSIS — R55 NEAR SYNCOPE: ICD-10-CM

## 2022-05-05 DIAGNOSIS — I10 HYPERTENSION, UNSPECIFIED TYPE: ICD-10-CM

## 2022-05-05 LAB
ALBUMIN UR-MCNC: NEGATIVE MG/DL
ANION GAP SERPL CALCULATED.3IONS-SCNC: 11 MMOL/L (ref 5–18)
APPEARANCE UR: CLEAR
BACTERIA #/AREA URNS HPF: ABNORMAL /HPF
BASOPHILS # BLD AUTO: 0.1 10E3/UL (ref 0–0.2)
BASOPHILS NFR BLD AUTO: 0 %
BILIRUB UR QL STRIP: NEGATIVE
BNP SERPL-MCNC: 51 PG/ML (ref 0–159)
BUN SERPL-MCNC: 14 MG/DL (ref 8–28)
CALCIUM SERPL-MCNC: 10.7 MG/DL (ref 8.5–10.5)
CHLORIDE BLD-SCNC: 103 MMOL/L (ref 98–107)
CO2 SERPL-SCNC: 23 MMOL/L (ref 22–31)
COLOR UR AUTO: COLORLESS
CREAT SERPL-MCNC: 0.74 MG/DL (ref 0.6–1.1)
EOSINOPHIL # BLD AUTO: 0.1 10E3/UL (ref 0–0.7)
EOSINOPHIL NFR BLD AUTO: 1 %
ERYTHROCYTE [DISTWIDTH] IN BLOOD BY AUTOMATED COUNT: 12.3 % (ref 10–15)
GFR SERPL CREATININE-BSD FRML MDRD: 81 ML/MIN/1.73M2
GLUCOSE BLD-MCNC: 115 MG/DL (ref 70–125)
GLUCOSE UR STRIP-MCNC: NEGATIVE MG/DL
HCT VFR BLD AUTO: 48.4 % (ref 35–47)
HGB BLD-MCNC: 16.4 G/DL (ref 11.7–15.7)
HGB UR QL STRIP: NEGATIVE
HOLD SPECIMEN: NORMAL
IMM GRANULOCYTES # BLD: 0.1 10E3/UL
IMM GRANULOCYTES NFR BLD: 0 %
KETONES UR STRIP-MCNC: NEGATIVE MG/DL
LEUKOCYTE ESTERASE UR QL STRIP: ABNORMAL
LYMPHOCYTES # BLD AUTO: 1.4 10E3/UL (ref 0.8–5.3)
LYMPHOCYTES NFR BLD AUTO: 12 %
MAGNESIUM SERPL-MCNC: 2.1 MG/DL (ref 1.8–2.6)
MCH RBC QN AUTO: 32.1 PG (ref 26.5–33)
MCHC RBC AUTO-ENTMCNC: 33.9 G/DL (ref 31.5–36.5)
MCV RBC AUTO: 95 FL (ref 78–100)
MONOCYTES # BLD AUTO: 1 10E3/UL (ref 0–1.3)
MONOCYTES NFR BLD AUTO: 8 %
NEUTROPHILS # BLD AUTO: 9.2 10E3/UL (ref 1.6–8.3)
NEUTROPHILS NFR BLD AUTO: 79 %
NITRATE UR QL: NEGATIVE
NRBC # BLD AUTO: 0 10E3/UL
NRBC BLD AUTO-RTO: 0 /100
PH UR STRIP: 6.5 [PH] (ref 5–7)
PLATELET # BLD AUTO: 257 10E3/UL (ref 150–450)
POTASSIUM BLD-SCNC: 4.6 MMOL/L (ref 3.5–5)
RBC # BLD AUTO: 5.11 10E6/UL (ref 3.8–5.2)
RBC URINE: 0 /HPF
SARS-COV-2 RNA RESP QL NAA+PROBE: NEGATIVE
SODIUM SERPL-SCNC: 137 MMOL/L (ref 136–145)
SP GR UR STRIP: 1 (ref 1–1.03)
SQUAMOUS EPITHELIAL: 1 /HPF
TROPONIN I SERPL-MCNC: <0.01 NG/ML (ref 0–0.29)
UROBILINOGEN UR STRIP-MCNC: <2 MG/DL
WBC # BLD AUTO: 11.8 10E3/UL (ref 4–11)
WBC URINE: 3 /HPF

## 2022-05-05 PROCEDURE — 99285 EMERGENCY DEPT VISIT HI MDM: CPT | Mod: 25

## 2022-05-05 PROCEDURE — 84484 ASSAY OF TROPONIN QUANT: CPT | Performed by: EMERGENCY MEDICINE

## 2022-05-05 PROCEDURE — 93005 ELECTROCARDIOGRAM TRACING: CPT | Performed by: EMERGENCY MEDICINE

## 2022-05-05 PROCEDURE — 80048 BASIC METABOLIC PNL TOTAL CA: CPT | Performed by: EMERGENCY MEDICINE

## 2022-05-05 PROCEDURE — 83735 ASSAY OF MAGNESIUM: CPT | Performed by: EMERGENCY MEDICINE

## 2022-05-05 PROCEDURE — 250N000011 HC RX IP 250 OP 636: Performed by: FAMILY MEDICINE

## 2022-05-05 PROCEDURE — 85018 HEMOGLOBIN: CPT | Performed by: EMERGENCY MEDICINE

## 2022-05-05 PROCEDURE — C9803 HOPD COVID-19 SPEC COLLECT: HCPCS

## 2022-05-05 PROCEDURE — 36415 COLL VENOUS BLD VENIPUNCTURE: CPT | Performed by: EMERGENCY MEDICINE

## 2022-05-05 PROCEDURE — 87635 SARS-COV-2 COVID-19 AMP PRB: CPT | Performed by: EMERGENCY MEDICINE

## 2022-05-05 PROCEDURE — 83880 ASSAY OF NATRIURETIC PEPTIDE: CPT | Mod: GZ | Performed by: EMERGENCY MEDICINE

## 2022-05-05 PROCEDURE — 71275 CT ANGIOGRAPHY CHEST: CPT

## 2022-05-05 PROCEDURE — 81003 URINALYSIS AUTO W/O SCOPE: CPT | Performed by: EMERGENCY MEDICINE

## 2022-05-05 RX ORDER — IOPAMIDOL 755 MG/ML
100 INJECTION, SOLUTION INTRAVASCULAR ONCE
Status: COMPLETED | OUTPATIENT
Start: 2022-05-05 | End: 2022-05-05

## 2022-05-05 RX ADMIN — IOPAMIDOL 100 ML: 755 INJECTION, SOLUTION INTRAVENOUS at 21:12

## 2022-05-05 NOTE — ED TRIAGE NOTES
Pt states she got up to go to the bathroom around noon and she began feeling weak, off balance, and short of breath which only lasted a short time.  She states she feels back to normal now.  Pt also c/o intermittent diarrhea for past few days.  Pt denies chest pain.  Pt states she has history of anxiety.     Triage Assessment     Row Name 05/05/22 3986       Triage Assessment (Adult)    Airway WDL WDL       Respiratory WDL    Respiratory WDL WDL       Skin Circulation/Temperature WDL    Skin Circulation/Temperature WDL WDL       Cardiac WDL    Cardiac WDL X  Blood pressure       Peripheral/Neurovascular WDL    Peripheral Neurovascular WDL WDL       Cognitive/Neuro/Behavioral WDL    Cognitive/Neuro/Behavioral WDL WDL

## 2022-05-05 NOTE — ED NOTES
ED Triage Provider Note  [facility]  Encounter Date: May 5, 2022    History:  No chief complaint on file.    Adilia Eric is a 81 year old female who presents to the ED with primarily come in for weakness, fatigue, near syncopal episode, has had recent diarrhea.  Denying current pain..     Review of Systems:  Review of Systems  Positive for weakness, fatigue, lightheadedness, negative for chest pain or palpitations currently    Exam:  There were no vitals taken for this visit.    Physical Exam  Cardiovascular:      Rate and Rhythm: Normal rate.   Pulmonary:      Effort: Pulmonary effort is normal.   Neurological:      Mental Status: She is alert. Mental status is at baseline.   Psychiatric:         Mood and Affect: Mood normal.         Medical Decision Making:  Patient arriving to the ED with problem as above. A medical screening exam was performed.  Work-up initiated from triage, will need more thorough evaluation back in the ER              Lyle Delgado MD on 5/5/2022 at 3:51 PM      Lab/Imaging Results:       Interventions:  Medications - No data to display         Lyle Delgado MD  05/05/22 0056

## 2022-05-06 LAB
ATRIAL RATE - MUSE: 89 BPM
DIASTOLIC BLOOD PRESSURE - MUSE: NORMAL MMHG
INTERPRETATION ECG - MUSE: NORMAL
P AXIS - MUSE: 63 DEGREES
PR INTERVAL - MUSE: 148 MS
QRS DURATION - MUSE: 68 MS
QT - MUSE: 338 MS
QTC - MUSE: 411 MS
R AXIS - MUSE: -27 DEGREES
SYSTOLIC BLOOD PRESSURE - MUSE: NORMAL MMHG
T AXIS - MUSE: 35 DEGREES
VENTRICULAR RATE- MUSE: 89 BPM

## 2022-05-06 NOTE — ED PROVIDER NOTES
EMERGENCY DEPARTMENT ENCOUNTER      NAME: Adilia Eric  AGE: 81 year old female  YOB: 1940  MRN: [id number]  EVALUATION DATE & TIME: 5/5/2022  7:09 PM    PCP: Dayne Azar    ED PROVIDER: Earnest Mohr M.D.    Chief Complaint   Patient presents with     Generalized Weakness     Shortness of Breath       FINAL IMPRESSION:  No diagnosis found.    ED COURSE & MEDICAL DECISION MAKING:    Pertinent Labs & Imaging studies personally reviewed and interpreted by me. (See chart for details)  7:17 PM Patient seen and examined, prior records reviewed.  Differential diagnosis includes but not limited to vasovagal syncope, dehydration, electrolyte disturbance, dysrhythmia, myocardial infarction, pulmonary embolism, urinary tract infection, pneumonia, intracranial hemorrhage.  Patient with a near syncopal episode at home tonight, vitally stable.  EKG is reassuring and patient did not have any chest pain or palpitations with this.  Labs and CT scan are ordered.  10:48 PM labs are reassuring including negative troponin, normal electrolytes.  CT PE protocol is negative.  Urinalysis is negative, COVID test is negative.  Patient feels well and will be discharged with follow-up with cardiology.  Rapid access referral was made.         At the conclusion of the encounter I discussed the results of all of the tests and the disposition. The questions were answered. The patient or family acknowledged understanding and was agreeable with the care plan.     PROCEDURES:   Procedures    MEDICATIONS GIVEN IN THE EMERGENCY:  Medications - No data to display    NEW PRESCRIPTIONS STARTED AT TODAY'S ER VISIT  New Prescriptions    No medications on file       =================================================================    HPI    Patient information was obtained from: patient      Adilia Eric is a 81 year old female with a pertinent history of high blood pressure, diabetes, anxiety who presents today with  lightheadedness.  Patient took a nap and when she stood up she was lightheaded and felt off balance, felt generalized weakness.  This lasted for about 45 minutes and then went away on its own.  No accompanying chest pain or palpitations.  She did not pass out or fall.  She denies nausea, vomiting, has had some loose stools recently but says that is not unusual for her.  Denies any urinary symptoms.  Denies any weakness, numbness, or tingling of the arms, legs, or face.    REVIEW OF SYSTEMS   Review of Systems   All other systems reviewed and negative    PAST MEDICAL HISTORY:  Past Medical History:   Diagnosis Date     Depression      Diabetes mellitus (H)      GERD (gastroesophageal reflux disease)      Hyperlipemia      Hypertension      Intracranial hemorrhage (H) 2011    may be amyloid angiopathy; has been stable.     Mastoiditis 2011     Osteoarthritis      TMJ (temporomandibular joint disorder)        PAST SURGICAL HISTORY:  Past Surgical History:   Procedure Laterality Date     CYST REMOVAL      tongue     HYSTERECTOMY  1996     OOPHORECTOMY Bilateral 1996       CURRENT MEDICATIONS:    No current facility-administered medications for this encounter.     Current Outpatient Medications   Medication     acetaminophen (TYLENOL) 500 MG tablet     allopurinol (ZYLOPRIM) 100 MG tablet     aspirin 325 MG tablet     atenoloL (TENORMIN) 50 MG tablet     atorvastatin (LIPITOR) 40 MG tablet     baclofen (LIORESAL) 10 MG tablet     calcium carbonate (OS-MARCELO) 600 mg calcium (1,500 mg) tablet     celecoxib (CELEBREX) 200 MG capsule     cyanocobalamin 1000 MCG tablet     diclofenac sodium (VOLTAREN) 1 % Gel     diphenhydrAMINE (BENADRYL) 25 mg tablet     ibuprofen (ADVIL,MOTRIN) 200 MG tablet     lactobacillus combo no.11 15 billion cell CpSP     LORazepam (ATIVAN) 0.5 MG tablet     magnesium oxide (MAG-OX) 400 mg tablet     metFORMIN (GLUCOPHAGE-XR) 500 MG 24 hr tablet       ALLERGIES:  No Known Allergies    FAMILY  HISTORY:  Family History   Problem Relation Age of Onset     Heart Disease Father      No Known Problems Mother      No Known Problems Sister      No Known Problems Daughter      No Known Problems Maternal Grandmother      No Known Problems Maternal Grandfather      No Known Problems Paternal Grandmother      No Known Problems Paternal Grandfather      No Known Problems Maternal Aunt      No Known Problems Paternal Aunt      Hereditary Breast and Ovarian Cancer Syndrome No family hx of      Breast Cancer No family hx of      Cancer No family hx of      Colon Cancer No family hx of      Endometrial Cancer No family hx of      Ovarian Cancer No family hx of        SOCIAL HISTORY:   Social History     Socioeconomic History     Marital status:    Tobacco Use     Smoking status: Former Smoker     Smokeless tobacco: Never Used   Substance and Sexual Activity     Alcohol use: Yes     Alcohol/week: 1.7 standard drinks     Drug use: No       VITALS:  There were no vitals taken for this visit.    PHYSICAL EXAM:  Physical Exam  Vitals and nursing note reviewed.   Constitutional:       Appearance: Normal appearance.   HENT:      Head: Normocephalic and atraumatic.      Right Ear: External ear normal.      Left Ear: External ear normal.      Nose: Nose normal.      Mouth/Throat:      Mouth: Mucous membranes are moist.   Eyes:      Extraocular Movements: Extraocular movements intact.      Conjunctiva/sclera: Conjunctivae normal.      Pupils: Pupils are equal, round, and reactive to light.   Cardiovascular:      Rate and Rhythm: Normal rate and regular rhythm.   Pulmonary:      Effort: Pulmonary effort is normal.      Breath sounds: Normal breath sounds. No wheezing or rales.   Abdominal:      General: Abdomen is flat. There is no distension.      Palpations: Abdomen is soft.      Tenderness: There is no abdominal tenderness. There is no guarding.   Musculoskeletal:         General: Normal range of motion.      Cervical  back: Normal range of motion and neck supple.      Right lower leg: No edema.      Left lower leg: No edema.   Lymphadenopathy:      Cervical: No cervical adenopathy.   Skin:     General: Skin is warm and dry.   Neurological:      General: No focal deficit present.      Mental Status: She is alert and oriented to person, place, and time. Mental status is at baseline.      Comments: No gross focal neurologic deficits   Psychiatric:         Mood and Affect: Mood normal.         Behavior: Behavior normal.         Thought Content: Thought content normal.          LAB:  All pertinent labs reviewed and interpreted.  Results for orders placed or performed during the hospital encounter of 05/05/22   Basic metabolic panel   Result Value Ref Range    Sodium 137 136 - 145 mmol/L    Potassium 4.6 3.5 - 5.0 mmol/L    Chloride 103 98 - 107 mmol/L    Carbon Dioxide (CO2) 23 22 - 31 mmol/L    Anion Gap 11 5 - 18 mmol/L    Urea Nitrogen 14 8 - 28 mg/dL    Creatinine 0.74 0.60 - 1.10 mg/dL    Calcium 10.7 (H) 8.5 - 10.5 mg/dL    Glucose 115 70 - 125 mg/dL    GFR Estimate 81 >60 mL/min/1.73m2   UA with Microscopic reflex to Culture    Specimen: Urine, Midstream   Result Value Ref Range    Color Urine Colorless Colorless, Straw, Light Yellow, Yellow    Appearance Urine Clear Clear    Glucose Urine Negative Negative mg/dL    Bilirubin Urine Negative Negative    Ketones Urine Negative Negative mg/dL    Specific Gravity Urine 1.003 1.001 - 1.030    Blood Urine Negative Negative    pH Urine 6.5 5.0 - 7.0    Protein Albumin Urine Negative Negative mg/dL    Urobilinogen Urine <2.0 <2.0 mg/dL    Nitrite Urine Negative Negative    Leukocyte Esterase Urine 75 Rylie/uL (A) Negative    Bacteria Urine Few (A) None Seen /HPF    RBC Urine 0 <=2 /HPF    WBC Urine 3 <=5 /HPF    Squamous Epithelials Urine 1 <=1 /HPF   CBC with platelets and differential   Result Value Ref Range    WBC Count 11.8 (H) 4.0 - 11.0 10e3/uL    RBC Count 5.11 3.80 - 5.20  10e6/uL    Hemoglobin 16.4 (H) 11.7 - 15.7 g/dL    Hematocrit 48.4 (H) 35.0 - 47.0 %    MCV 95 78 - 100 fL    MCH 32.1 26.5 - 33.0 pg    MCHC 33.9 31.5 - 36.5 g/dL    RDW 12.3 10.0 - 15.0 %    Platelet Count 257 150 - 450 10e3/uL    % Neutrophils 79 %    % Lymphocytes 12 %    % Monocytes 8 %    % Eosinophils 1 %    % Basophils 0 %    % Immature Granulocytes 0 %    NRBCs per 100 WBC 0 <1 /100    Absolute Neutrophils 9.2 (H) 1.6 - 8.3 10e3/uL    Absolute Lymphocytes 1.4 0.8 - 5.3 10e3/uL    Absolute Monocytes 1.0 0.0 - 1.3 10e3/uL    Absolute Eosinophils 0.1 0.0 - 0.7 10e3/uL    Absolute Basophils 0.1 0.0 - 0.2 10e3/uL    Absolute Immature Granulocytes 0.1 <=0.4 10e3/uL    Absolute NRBCs 0.0 10e3/uL       RADIOLOGY:  Reviewed all pertinent imaging. Please see official radiology report.  No orders to display     EKG:    Performed at: 4:35 PM  Impression: Normal EKG  Rate: 89  Rhythm: Sinus  Axis: Normal  DE Interval: 148  QRS Interval: 68  QTc Interval: 411  ST Changes: No acute ischemic changes  Comparison: April 20, no acute changes    I have independently reviewed and interpreted the EKG(s) documented above.    Earnest Mohr M.D.  Emergency Medicine  [facility] EMERGENCY DEPARTMENT  [facility] [facility] [id number] [phone]  Dept: [phone]     Earnest Mohr MD  05/05/22 5026

## 2022-05-11 ENCOUNTER — OFFICE VISIT (OUTPATIENT)
Dept: CARDIOLOGY | Facility: CLINIC | Age: 82
End: 2022-05-11
Attending: FAMILY MEDICINE
Payer: COMMERCIAL

## 2022-05-11 VITALS
HEART RATE: 76 BPM | WEIGHT: 131 LBS | SYSTOLIC BLOOD PRESSURE: 130 MMHG | DIASTOLIC BLOOD PRESSURE: 84 MMHG | RESPIRATION RATE: 16 BRPM | BODY MASS INDEX: 24.75 KG/M2

## 2022-05-11 DIAGNOSIS — I10 HYPERTENSION, UNSPECIFIED TYPE: ICD-10-CM

## 2022-05-11 DIAGNOSIS — R06.09 DYSPNEA ON EXERTION: Primary | ICD-10-CM

## 2022-05-11 DIAGNOSIS — R55 NEAR SYNCOPE: ICD-10-CM

## 2022-05-11 PROCEDURE — 99214 OFFICE O/P EST MOD 30 MIN: CPT | Performed by: INTERNAL MEDICINE

## 2022-05-11 RX ORDER — METOPROLOL SUCCINATE 50 MG/1
50 TABLET, EXTENDED RELEASE ORAL 2 TIMES DAILY
Qty: 180 TABLET | Refills: 11 | Status: SHIPPED | OUTPATIENT
Start: 2022-05-11 | End: 2024-02-07

## 2022-05-11 RX ORDER — LISINOPRIL 10 MG/1
10 TABLET ORAL DAILY
COMMUNITY
Start: 2022-04-29 | End: 2023-04-21

## 2022-05-11 NOTE — PROGRESS NOTES
Thank you, Dr. Mohr, for asking the [facility] team to see Ms. Adilia Eric to evaluate       Assessment/Recommendations   Assessment/Plan:  1. Dyspnea on exertion - in setting of HTN and DM, could be related to HTN but also CAD due to DM/HTN (althought lipids excellent in 2020 - repeat pending with Dr. Azar), would change atenolol to metoprolol succinate 50mg bid and stress nuclear imaging to screen for ischemia  2. HTN - on lisinopril and change above beta blocker  3. CV prevention - repeat lipids goal LDL < 70mgd/L given DM    Follow up prn     History of Present Illness/Subjective    Ms. Adilia Eric is a 81 year old female with HTN, NIDDM, TIA/CVA, with dyspnea one xertion and elevated BP, started on lisinopril, breathing has improved some.  She noticed last month increase in dyspnea, started on lisinoprl 2 weeks ago, no tob, MI, PE/DVT, syncopal events, PND/orthonpea, edema, chest pain/pressure. No GI/ bleeding.  No plans for surgery/biospies.  Mild CAD on angiogrpahy in 20113 and normal stress ncuelar in 2019, 2020 LDL 57 and HDL 62, echo 7/202 normalEF and mild AI.  CT PE last week no PE.     She is on:  Atenolol 100mg at night  Lisinopril 10mg  Atorvastatin 40mg         Physical Examination Review of Systems   /84 (BP Location: Left arm, Patient Position: Sitting, Cuff Size: Adult Regular)   Pulse 76   Resp 16   Wt 59.4 kg (131 lb)   BMI 24.75 kg/m    Body mass index is 24.75 kg/m .  Wt Readings from Last 3 Encounters:   05/11/22 59.4 kg (131 lb)   04/28/22 57.6 kg (127 lb)   06/03/19 62.1 kg (137 lb)     [unfilled]  General Appearance:   no distress, normal body habitus   ENT/Mouth: membranes moist, no oral lesions or bleeding gums.      EYES:  no scleral icterus, normal conjunctivae   Neck: no carotid bruits or thyromegaly   Chest/Lungs:   lungs are clear to auscultation, no rales or wheezing,  sternal scar, equal chest wall expansion    Cardiovascular:    Regular. Normal first and second heart sounds with no murmurs, rubs, or gallops; the carotid, radial and posterior tibial pulses are intact, Jugular venous pressure , edema bilaterally    Abdomen:  no organomegaly, masses, bruits, or tenderness; bowel sounds are present   Extremities: no cyanosis or clubbing   Skin: no xanthelasma, warm.    Neurologic: normal  bilateral, no tremors     Psychiatric: alert and oriented x3, calm     Review of Systems - 12 points nega other than above      Medical History  Surgical History Family History Social History   Past Medical History:   Diagnosis Date     Depression      Diabetes mellitus (H)      GERD (gastroesophageal reflux disease)      Hyperlipemia      Hypertension      Intracranial hemorrhage (H) 2011    may be amyloid angiopathy; has been stable.     Mastoiditis 2011     Osteoarthritis      TMJ (temporomandibular joint disorder)     Past Surgical History:   Procedure Laterality Date     CYST REMOVAL      tongue     HYSTERECTOMY  1996     OOPHORECTOMY Bilateral 1996    Family History   Problem Relation Age of Onset     Heart Disease Father      No Known Problems Mother      No Known Problems Sister      No Known Problems Daughter      No Known Problems Maternal Grandmother      No Known Problems Maternal Grandfather      No Known Problems Paternal Grandmother      No Known Problems Paternal Grandfather      No Known Problems Maternal Aunt      No Known Problems Paternal Aunt      Hereditary Breast and Ovarian Cancer Syndrome No family hx of      Breast Cancer No family hx of      Cancer No family hx of      Colon Cancer No family hx of      Endometrial Cancer No family hx of      Ovarian Cancer No family hx of     Social History     Socioeconomic History     Marital status:      Spouse name: Not on file     Number of children: Not on file     Years of education: Not on file     Highest education level: Not on file   Occupational History     Not on file    Tobacco Use     Smoking status: Former Smoker     Smokeless tobacco: Never Used   Substance and Sexual Activity     Alcohol use: Yes     Alcohol/week: 1.7 standard drinks     Drug use: No     Sexual activity: Not on file   Other Topics Concern     Not on file   Social History Narrative     Not on file     Social Determinants of Health     Financial Resource Strain: Not on file   Food Insecurity: Not on file   Transportation Needs: Not on file   Physical Activity: Not on file   Stress: Not on file   Social Connections: Not on file   Intimate Partner Violence: Not on file   Housing Stability: Not on file          Medications  Allergies   Scheduled Meds:  Continuous Infusions:  PRN Meds:. No Known Allergies      Lab Results    Chemistry/lipid CBC Cardiac Enzymes/BNP/TSH/INR   Lab Results   Component Value Date    CHOL 121 07/31/2020    HDL 42 (L) 07/31/2020    TRIG 108 07/31/2020    BUN 14 05/05/2022     05/05/2022    CO2 23 05/05/2022    Lab Results   Component Value Date    WBC 11.8 (H) 05/05/2022    HGB 16.4 (H) 05/05/2022    HCT 48.4 (H) 05/05/2022    MCV 95 05/05/2022     05/05/2022    Lab Results   Component Value Date    TROPONINI <0.01 05/05/2022    BNP 51 05/05/2022    TSH 0.64 12/15/2021              Richard Workman MD  Interventional Cardiology  [facility]

## 2022-05-11 NOTE — LETTER
5/11/2022    Dayne Azar MD  [facility] [address]  [place] [address]    RE: Adilia Eric       Dear Colleague,     I had the pleasure of seeing Adilia Eric in the [facility].    Thank you, Dr. Mohr, for asking the [facility] team to see Ms. Adilia Eric to evaluate       Assessment/Recommendations   Assessment/Plan:  1. Dyspnea on exertion - in setting of HTN and DM, could be related to HTN but also CAD due to DM/HTN (althought lipids excellent in 2020 - repeat pending with Dr. Azar), would change atenolol to metoprolol succinate 50mg bid and stress nuclear imaging to screen for ischemia  2. HTN - on lisinopril and change above beta blocker  3. CV prevention - repeat lipids goal LDL < 70mgd/L given DM    Follow up prn     History of Present Illness/Subjective    Ms. Adilia Eric is a 81 year old female with HTN, NIDDM, TIA/CVA, with dyspnea one xertion and elevated BP, started on lisinopril, breathing has improved some.  She noticed last month increase in dyspnea, started on lisinoprl 2 weeks ago, no tob, MI, PE/DVT, syncopal events, PND/orthonpea, edema, chest pain/pressure. No GI/ bleeding.  No plans for surgery/biospies.  Mild CAD on angiogrpahy in 20113 and normal stress ncuelar in 2019, 2020 LDL 57 and HDL 62, echo 7/202 normalEF and mild AI.  CT PE last week no PE.     She is on:  Atenolol 100mg at night  Lisinopril 10mg  Atorvastatin 40mg         Physical Examination Review of Systems   /84 (BP Location: Left arm, Patient Position: Sitting, Cuff Size: Adult Regular)   Pulse 76   Resp 16   Wt 59.4 kg (131 lb)   BMI 24.75 kg/m    Body mass index is 24.75 kg/m .  Wt Readings from Last 3 Encounters:   05/11/22 59.4 kg (131 lb)   04/28/22 57.6 kg (127 lb)   06/03/19 62.1 kg (137 lb)     [unfilled]  General Appearance:   no distress, normal body habitus   ENT/Mouth: membranes moist, no oral lesions or  bleeding gums.      EYES:  no scleral icterus, normal conjunctivae   Neck: no carotid bruits or thyromegaly   Chest/Lungs:   lungs are clear to auscultation, no rales or wheezing,  sternal scar, equal chest wall expansion    Cardiovascular:   Regular. Normal first and second heart sounds with no murmurs, rubs, or gallops; the carotid, radial and posterior tibial pulses are intact, Jugular venous pressure , edema bilaterally    Abdomen:  no organomegaly, masses, bruits, or tenderness; bowel sounds are present   Extremities: no cyanosis or clubbing   Skin: no xanthelasma, warm.    Neurologic: normal  bilateral, no tremors     Psychiatric: alert and oriented x3, calm     Review of Systems - 12 points nega other than above      Medical History  Surgical History Family History Social History   Past Medical History:   Diagnosis Date     Depression      Diabetes mellitus (H)      GERD (gastroesophageal reflux disease)      Hyperlipemia      Hypertension      Intracranial hemorrhage (H) 2011    may be amyloid angiopathy; has been stable.     Mastoiditis 2011     Osteoarthritis      TMJ (temporomandibular joint disorder)     Past Surgical History:   Procedure Laterality Date     CYST REMOVAL      tongue     HYSTERECTOMY  1996     OOPHORECTOMY Bilateral 1996    Family History   Problem Relation Age of Onset     Heart Disease Father      No Known Problems Mother      No Known Problems Sister      No Known Problems Daughter      No Known Problems Maternal Grandmother      No Known Problems Maternal Grandfather      No Known Problems Paternal Grandmother      No Known Problems Paternal Grandfather      No Known Problems Maternal Aunt      No Known Problems Paternal Aunt      Hereditary Breast and Ovarian Cancer Syndrome No family hx of      Breast Cancer No family hx of      Cancer No family hx of      Colon Cancer No family hx of      Endometrial Cancer No family hx of      Ovarian Cancer No family hx of     Social History      Socioeconomic History     Marital status:      Spouse name: Not on file     Number of children: Not on file     Years of education: Not on file     Highest education level: Not on file   Occupational History     Not on file   Tobacco Use     Smoking status: Former Smoker     Smokeless tobacco: Never Used   Substance and Sexual Activity     Alcohol use: Yes     Alcohol/week: 1.7 standard drinks     Drug use: No     Sexual activity: Not on file   Other Topics Concern     Not on file   Social History Narrative     Not on file     Social Determinants of Health     Financial Resource Strain: Not on file   Food Insecurity: Not on file   Transportation Needs: Not on file   Physical Activity: Not on file   Stress: Not on file   Social Connections: Not on file   Intimate Partner Violence: Not on file   Housing Stability: Not on file          Medications  Allergies   Scheduled Meds:  Continuous Infusions:  PRN Meds:. No Known Allergies      Lab Results    Chemistry/lipid CBC Cardiac Enzymes/BNP/TSH/INR   Lab Results   Component Value Date    CHOL 121 07/31/2020    HDL 42 (L) 07/31/2020    TRIG 108 07/31/2020    BUN 14 05/05/2022     05/05/2022    CO2 23 05/05/2022    Lab Results   Component Value Date    WBC 11.8 (H) 05/05/2022    HGB 16.4 (H) 05/05/2022    HCT 48.4 (H) 05/05/2022    MCV 95 05/05/2022     05/05/2022    Lab Results   Component Value Date    TROPONINI <0.01 05/05/2022    BNP 51 05/05/2022    TSH 0.64 12/15/2021              Richard Workmna MD  Interventional Cardiology  [facility]            Thank you for allowing me to participate in the care of your patient.      Sincerely,     Richard Workman MD     [facility]  cc:   Earnest Mohr MD  [address] [place] [address]

## 2022-05-11 NOTE — PATIENT INSTRUCTIONS
Stop atenolol and start metoprolol succinate 50mg twice a day  Continue lisinopril  Lower aspirin 162mg daily (two baby asp or half 325mg tablet)    Schedule chemical stress nuclear imaging

## 2022-05-16 ENCOUNTER — LAB REQUISITION (OUTPATIENT)
Dept: LAB | Facility: CLINIC | Age: 82
End: 2022-05-16

## 2022-05-16 DIAGNOSIS — E83.52 HYPERCALCEMIA: ICD-10-CM

## 2022-05-16 DIAGNOSIS — E78.2 MIXED HYPERLIPIDEMIA: ICD-10-CM

## 2022-05-16 DIAGNOSIS — I10 ESSENTIAL (PRIMARY) HYPERTENSION: ICD-10-CM

## 2022-05-16 LAB
ALBUMIN SERPL-MCNC: 4.4 G/DL (ref 3.5–5)
ALP SERPL-CCNC: 93 U/L (ref 45–120)
ALT SERPL W P-5'-P-CCNC: 18 U/L (ref 0–45)
ANION GAP SERPL CALCULATED.3IONS-SCNC: 9 MMOL/L (ref 5–18)
AST SERPL W P-5'-P-CCNC: 17 U/L (ref 0–40)
BASOPHILS # BLD AUTO: 0 10E3/UL (ref 0–0.2)
BASOPHILS NFR BLD AUTO: 1 %
BILIRUB SERPL-MCNC: 2.3 MG/DL (ref 0–1)
BUN SERPL-MCNC: 9 MG/DL (ref 8–28)
CALCIUM SERPL-MCNC: 10.5 MG/DL (ref 8.5–10.5)
CHLORIDE BLD-SCNC: 99 MMOL/L (ref 98–107)
CHOLEST SERPL-MCNC: 115 MG/DL
CO2 SERPL-SCNC: 27 MMOL/L (ref 22–31)
CREAT SERPL-MCNC: 0.78 MG/DL (ref 0.6–1.1)
EOSINOPHIL # BLD AUTO: 0.1 10E3/UL (ref 0–0.7)
EOSINOPHIL NFR BLD AUTO: 2 %
ERYTHROCYTE [DISTWIDTH] IN BLOOD BY AUTOMATED COUNT: 12.5 % (ref 10–15)
GFR SERPL CREATININE-BSD FRML MDRD: 76 ML/MIN/1.73M2
GLUCOSE BLD-MCNC: 124 MG/DL (ref 70–125)
HCT VFR BLD AUTO: 45.1 % (ref 35–47)
HDLC SERPL-MCNC: 44 MG/DL
HGB BLD-MCNC: 15.3 G/DL (ref 11.7–15.7)
IMM GRANULOCYTES # BLD: 0 10E3/UL
IMM GRANULOCYTES NFR BLD: 0 %
LDLC SERPL CALC-MCNC: 50 MG/DL
LYMPHOCYTES # BLD AUTO: 1.5 10E3/UL (ref 0.8–5.3)
LYMPHOCYTES NFR BLD AUTO: 19 %
MCH RBC QN AUTO: 32.2 PG (ref 26.5–33)
MCHC RBC AUTO-ENTMCNC: 33.9 G/DL (ref 31.5–36.5)
MCV RBC AUTO: 95 FL (ref 78–100)
MONOCYTES # BLD AUTO: 0.8 10E3/UL (ref 0–1.3)
MONOCYTES NFR BLD AUTO: 10 %
NEUTROPHILS # BLD AUTO: 5.2 10E3/UL (ref 1.6–8.3)
NEUTROPHILS NFR BLD AUTO: 68 %
NRBC # BLD AUTO: 0 10E3/UL
NRBC BLD AUTO-RTO: 0 /100
PLATELET # BLD AUTO: 265 10E3/UL (ref 150–450)
POTASSIUM BLD-SCNC: 4.2 MMOL/L (ref 3.5–5)
PROT SERPL-MCNC: 6.8 G/DL (ref 6–8)
PTH-INTACT SERPL-MCNC: 65 PG/ML (ref 10–86)
RBC # BLD AUTO: 4.75 10E6/UL (ref 3.8–5.2)
SODIUM SERPL-SCNC: 135 MMOL/L (ref 136–145)
TRIGL SERPL-MCNC: 106 MG/DL
WBC # BLD AUTO: 7.7 10E3/UL (ref 4–11)

## 2022-05-16 PROCEDURE — 80061 LIPID PANEL: CPT | Performed by: FAMILY MEDICINE

## 2022-05-16 PROCEDURE — 80053 COMPREHEN METABOLIC PANEL: CPT | Performed by: FAMILY MEDICINE

## 2022-05-16 PROCEDURE — 83970 ASSAY OF PARATHORMONE: CPT | Performed by: FAMILY MEDICINE

## 2022-05-16 PROCEDURE — 85004 AUTOMATED DIFF WBC COUNT: CPT | Performed by: FAMILY MEDICINE

## 2022-05-18 ENCOUNTER — HOSPITAL ENCOUNTER (OUTPATIENT)
Dept: NUCLEAR MEDICINE | Facility: HOSPITAL | Age: 82
Discharge: HOME | End: 2022-05-18
Attending: INTERNAL MEDICINE
Payer: COMMERCIAL

## 2022-05-18 ENCOUNTER — HOSPITAL ENCOUNTER (OUTPATIENT)
Dept: CARDIOLOGY | Facility: HOSPITAL | Age: 82
Discharge: HOME | End: 2022-05-18
Attending: INTERNAL MEDICINE
Payer: COMMERCIAL

## 2022-05-18 DIAGNOSIS — R06.09 DYSPNEA ON EXERTION: ICD-10-CM

## 2022-05-18 LAB
CV STRESS CURRENT BP HE: NORMAL
CV STRESS CURRENT HR HE: 101
CV STRESS CURRENT HR HE: 101
CV STRESS CURRENT HR HE: 102
CV STRESS CURRENT HR HE: 102
CV STRESS CURRENT HR HE: 103
CV STRESS CURRENT HR HE: 104
CV STRESS CURRENT HR HE: 105
CV STRESS CURRENT HR HE: 106
CV STRESS CURRENT HR HE: 107
CV STRESS CURRENT HR HE: 107
CV STRESS CURRENT HR HE: 108
CV STRESS CURRENT HR HE: 108
CV STRESS CURRENT HR HE: 83
CV STRESS CURRENT HR HE: 83
CV STRESS CURRENT HR HE: 93
CV STRESS CURRENT HR HE: 94
CV STRESS CURRENT HR HE: 97
CV STRESS DEVIATION TIME HE: NORMAL
CV STRESS ECHO PERCENT HR HE: NORMAL
CV STRESS EXERCISE STAGE HE: NORMAL
CV STRESS FINAL RESTING BP HE: NORMAL
CV STRESS FINAL RESTING HR HE: 94
CV STRESS MAX HR HE: 108
CV STRESS MAX TREADMILL GRADE HE: 0
CV STRESS MAX TREADMILL SPEED HE: 0
CV STRESS PEAK DIA BP HE: NORMAL
CV STRESS PEAK SYS BP HE: NORMAL
CV STRESS PHASE HE: NORMAL
CV STRESS PROTOCOL HE: NORMAL
CV STRESS RESTING PT POSITION HE: NORMAL
CV STRESS ST DEVIATION AMOUNT HE: NORMAL
CV STRESS ST DEVIATION ELEVATION HE: NORMAL
CV STRESS ST EVELATION AMOUNT HE: NORMAL
CV STRESS TEST TYPE HE: NORMAL
CV STRESS TOTAL STAGE TIME MIN 1 HE: NORMAL
RATE PRESSURE PRODUCT: NORMAL
STRESS ECHO BASELINE DIASTOLIC HE: 99
STRESS ECHO BASELINE HR: 80
STRESS ECHO BASELINE SYSTOLIC BP: 177
STRESS ECHO CALCULATED PERCENT HR: 78 %
STRESS ECHO LAST STRESS DIASTOLIC BP: 75
STRESS ECHO LAST STRESS HR: 106
STRESS ECHO LAST STRESS SYSTOLIC BP: 134
STRESS ECHO TARGET HR: 139

## 2022-05-18 PROCEDURE — 78452 HT MUSCLE IMAGE SPECT MULT: CPT

## 2022-05-18 PROCEDURE — 93016 CV STRESS TEST SUPVJ ONLY: CPT | Performed by: INTERNAL MEDICINE

## 2022-05-18 PROCEDURE — 78452 HT MUSCLE IMAGE SPECT MULT: CPT | Mod: 26 | Performed by: INTERNAL MEDICINE

## 2022-05-18 PROCEDURE — A9500 TC99M SESTAMIBI: HCPCS | Performed by: INTERNAL MEDICINE

## 2022-05-18 PROCEDURE — 250N000011 HC RX IP 250 OP 636: Performed by: INTERNAL MEDICINE

## 2022-05-18 PROCEDURE — 93018 CV STRESS TEST I&R ONLY: CPT | Performed by: INTERNAL MEDICINE

## 2022-05-18 PROCEDURE — 343N000001 HC RX 343: Performed by: INTERNAL MEDICINE

## 2022-05-18 PROCEDURE — 93017 CV STRESS TEST TRACING ONLY: CPT

## 2022-05-18 RX ORDER — CAFFEINE 200 MG
200 TABLET ORAL
Status: DISCONTINUED | OUTPATIENT
Start: 2022-05-18 | End: 2022-05-18 | Stop reason: HOSPADM

## 2022-05-18 RX ORDER — REGADENOSON 0.08 MG/ML
0.4 INJECTION, SOLUTION INTRAVENOUS ONCE
Status: COMPLETED | OUTPATIENT
Start: 2022-05-18 | End: 2022-05-18

## 2022-05-18 RX ORDER — ALBUTEROL SULFATE 0.83 MG/ML
2.5 SOLUTION RESPIRATORY (INHALATION)
Status: DISCONTINUED | OUTPATIENT
Start: 2022-05-18 | End: 2022-05-18 | Stop reason: HOSPADM

## 2022-05-18 RX ORDER — CAFFEINE CITRATE 20 MG/ML
60 SOLUTION INTRAVENOUS
Status: DISCONTINUED | OUTPATIENT
Start: 2022-05-18 | End: 2022-05-18 | Stop reason: HOSPADM

## 2022-05-18 RX ORDER — AMINOPHYLLINE 25 MG/ML
50 INJECTION, SOLUTION INTRAVENOUS
Status: DISCONTINUED | OUTPATIENT
Start: 2022-05-18 | End: 2022-05-18 | Stop reason: HOSPADM

## 2022-05-18 RX ADMIN — Medication 33 MILLICURIE: at 15:00

## 2022-05-18 RX ADMIN — AMINOPHYLLINE 50 MG: 25 INJECTION, SOLUTION INTRAVENOUS at 15:10

## 2022-05-18 RX ADMIN — REGADENOSON 0.4 MG: 0.08 INJECTION, SOLUTION INTRAVENOUS at 15:02

## 2022-05-18 RX ADMIN — Medication 8.24 MILLICURIE: at 13:55

## 2022-05-28 ENCOUNTER — HEALTH MAINTENANCE LETTER (OUTPATIENT)
Age: 82
End: 2022-05-28

## 2022-07-23 ENCOUNTER — HEALTH MAINTENANCE LETTER (OUTPATIENT)
Age: 82
End: 2022-07-23

## 2022-10-01 ENCOUNTER — HEALTH MAINTENANCE LETTER (OUTPATIENT)
Age: 82
End: 2022-10-01

## 2023-01-20 ENCOUNTER — TRANSCRIBE ORDERS (OUTPATIENT)
Dept: OTHER | Age: 83
End: 2023-01-20

## 2023-01-20 ENCOUNTER — MEDICAL CORRESPONDENCE (OUTPATIENT)
Dept: HEALTH INFORMATION MANAGEMENT | Facility: CLINIC | Age: 83
End: 2023-01-20
Payer: COMMERCIAL

## 2023-01-20 ENCOUNTER — TRANSFERRED RECORDS (OUTPATIENT)
Dept: HEALTH INFORMATION MANAGEMENT | Facility: CLINIC | Age: 83
End: 2023-01-20
Payer: COMMERCIAL

## 2023-01-20 DIAGNOSIS — R06.09 DOE (DYSPNEA ON EXERTION): Primary | ICD-10-CM

## 2023-01-20 LAB — PHQ9 SCORE: 4

## 2023-01-27 ENCOUNTER — OFFICE VISIT (OUTPATIENT)
Dept: CARDIOLOGY | Facility: CLINIC | Age: 83
End: 2023-01-27
Payer: COMMERCIAL

## 2023-01-27 VITALS
WEIGHT: 135 LBS | HEIGHT: 60 IN | HEART RATE: 88 BPM | OXYGEN SATURATION: 94 % | BODY MASS INDEX: 26.5 KG/M2 | DIASTOLIC BLOOD PRESSURE: 113 MMHG | SYSTOLIC BLOOD PRESSURE: 166 MMHG

## 2023-01-27 DIAGNOSIS — I48.0 PAROXYSMAL ATRIAL FIBRILLATION (H): Primary | ICD-10-CM

## 2023-01-27 DIAGNOSIS — R06.09 DOE (DYSPNEA ON EXERTION): ICD-10-CM

## 2023-01-27 DIAGNOSIS — I10 PRIMARY HYPERTENSION: ICD-10-CM

## 2023-01-27 LAB
ATRIAL RATE - MUSE: 75 BPM
DIASTOLIC BLOOD PRESSURE - MUSE: NORMAL MMHG
INTERPRETATION ECG - MUSE: NORMAL
P AXIS - MUSE: 49 DEGREES
PR INTERVAL - MUSE: 152 MS
QRS DURATION - MUSE: 72 MS
QT - MUSE: 378 MS
QTC - MUSE: 422 MS
R AXIS - MUSE: -20 DEGREES
SYSTOLIC BLOOD PRESSURE - MUSE: NORMAL MMHG
T AXIS - MUSE: 6 DEGREES
VENTRICULAR RATE- MUSE: 75 BPM

## 2023-01-27 PROCEDURE — 93000 ELECTROCARDIOGRAM COMPLETE: CPT | Performed by: INTERNAL MEDICINE

## 2023-01-27 PROCEDURE — 99214 OFFICE O/P EST MOD 30 MIN: CPT | Mod: 25 | Performed by: INTERNAL MEDICINE

## 2023-01-27 RX ORDER — HYDROCHLOROTHIAZIDE 12.5 MG/1
12.5 CAPSULE ORAL EVERY MORNING
Qty: 90 CAPSULE | Refills: 3 | Status: SHIPPED | OUTPATIENT
Start: 2023-01-27 | End: 2023-12-14

## 2023-01-27 NOTE — LETTER
1/27/2023    Dayne Azar MD  [address]  [place] [address]    RE: Adilia Eric       Dear Colleague,     I had the pleasure of seeing Adilia Eric in the [facility].    CARDIOLOGY RAPID ACCESS CLINIC CONSULT NOTE     Assessment/Plan:   1.  Paroxysmal atrial fibrillation with rapid ventricular response.  Will check echocardiogram to look for evidence of structural heart disease.  With her [id number] score of at least 7, she merits long-term anticoagulation to prevent recurrent cerebrovascular events.  She is tolerating Eliquis well.  Discussed rationale for its use.  She was advised to discontinue aspirin.  2.  Essential hypertension with poor control today.  Advised low-sodium diet, will add HCTZ 12.5 mg daily to present medical regimen.  3.  Probable obstructive sleep apnea based on symptoms.  Take the liberty of referring her for sleep apnea evaluation.  4.  Dyspnea on exertion.  Likely multifactorial in etiology.  As she has returned to sinus rhythm, clearly her dyspnea is not solely related to paroxysmal atrial fibrillation.  May be combination of atrial fibrillation, poorly controlled hypertension, and obstructive sleep apnea.    Follow-up 3 months or sooner based on test results.     History of Present Illness:     It is my pleasure to see Adilia Eric at the [facility] RAPID ACCESS clinic for evaluation of new onset atrial fibrillation.    Adilia Eric is a 82 year old female with a past medical history of diabetes mellitus type 2, hypertension, was seen by Dr. Workman 5/2022 for evaluation of dyspnea on exertion.  Pharmacologic nuclear stress test was normal at that time.    She reports that years ago she had some symptoms that she does not recall that were felt to be related to a TIA, but denies a history of stroke.  She reports leg weakness related to hip surgery, and walks with a cane.    Over the last several weeks,  she has been more short of breath than usual, consistently with activities such as getting up to walk to the bathroom.  She has not experienced any chest pain.  She has had no syncope or falls.  She does notice small amount of lower extremity edema.  She also has daytime sleepiness, napping often.  She saw Dr. Azar who noted atrial fibrillation with rapid ventricular response at 136 bpm on EKG.  He increased her metoprolol to 50 mg twice daily started Eliquis 5 mg twice daily.  She has not noticed any significant difference on these medications.  She has had no bruising or bleeding.  She has had no  near syncopal spells, or awareness of an irregular heartbeat.  She does not try to limit the sodium content in her diet    Past Medical History:     Patient Active Problem List   Diagnosis     Stroke (H)     Anxiety disorder     Gout     Hypertension     Hyperlipidemia     Paresthesia     Generalized anxiety disorder     Panic disorder without agoraphobia     Adjustment disorder with mixed anxiety and depressed mood     Mood disorder in conditions classified elsewhere     Near syncope     Vaginal discharge       Past Surgical History:     Past Surgical History:   Procedure Laterality Date     CYST REMOVAL      tongue     HYSTERECTOMY  1996     OOPHORECTOMY Bilateral 1996       Family History:     Family History   Problem Relation Age of Onset     Heart Disease Father      No Known Problems Mother      No Known Problems Sister      No Known Problems Daughter      No Known Problems Maternal Grandmother      No Known Problems Maternal Grandfather      No Known Problems Paternal Grandmother      No Known Problems Paternal Grandfather      No Known Problems Maternal Aunt      No Known Problems Paternal Aunt      Hereditary Breast and Ovarian Cancer Syndrome No family hx of      Breast Cancer No family hx of      Cancer No family hx of      Colon Cancer No family hx of      Endometrial Cancer No family hx of      Ovarian  Cancer No family hx of      Family history reviewed and is not pertinent to the chief complaint or presenting problem    Social History:    reports that she has quit smoking. She has never used smokeless tobacco. She reports current alcohol use of about 1.7 standard drinks per week. She reports that she does not use drugs.    Exercise: Walks with cane, limited movement of right leg    Sleep: Poorly restorative supine.  Sleepy during the day, napping often.    Meds:     Current Outpatient Medications   Medication Sig Dispense Refill     acetaminophen (TYLENOL) 500 MG tablet [ACETAMINOPHEN (TYLENOL) 500 MG TABLET] Take 1,000 mg by mouth 2 (two) times a day as needed.        allopurinol (ZYLOPRIM) 100 MG tablet Take 100 mg by mouth daily       aspirin (ASA) 81 MG EC tablet Take 2 tablets (162 mg) by mouth daily       atorvastatin (LIPITOR) 40 MG tablet [ATORVASTATIN (LIPITOR) 40 MG TABLET] Take 40 mg by mouth bedtime.       baclofen (LIORESAL) 10 MG tablet [BACLOFEN (LIORESAL) 10 MG TABLET] Take 10 mg by mouth at bedtime.        calcium carbonate (OS-MARCELO) 600 mg calcium (1,500 mg) tablet [CALCIUM CARBONATE (OS-MARCELO) 600 MG CALCIUM (1,500 MG) TABLET] Take 1 tablet by mouth daily.        celecoxib (CELEBREX) 200 MG capsule [CELECOXIB (CELEBREX) 200 MG CAPSULE] Take 200 mg by mouth daily.       cyanocobalamin 1000 MCG tablet [CYANOCOBALAMIN 1000 MCG TABLET] Take 1,000 mcg by mouth daily.       diphenhydrAMINE (BENADRYL) 25 mg tablet [DIPHENHYDRAMINE (BENADRYL) 25 MG TABLET] Take 25 mg by mouth at bedtime.       lisinopril (ZESTRIL) 10 MG tablet Take 10 mg by mouth daily       LORazepam (ATIVAN) 0.5 MG tablet [LORAZEPAM (ATIVAN) 0.5 MG TABLET] Take 0.5 mg by mouth 2 (two) times a day as needed.        metFORMIN (GLUCOPHAGE-XR) 500 MG 24 hr tablet [METFORMIN (GLUCOPHAGE-XR) 500 MG 24 HR TABLET] TAKE 1 TABLET BY MOUTH EVERY DAY FOR DIABETES  1     metoprolol succinate ER (TOPROL XL) 50 MG 24 hr tablet Take 1 tablet (50 mg) by  mouth 2 times daily 180 tablet 11     diclofenac sodium (VOLTAREN) 1 % Gel [DICLOFENAC SODIUM (VOLTAREN) 1 % GEL] Apply topically 4 (four) times a day as needed.        (Patient not taking: Reported on 1/27/2023)       ibuprofen (ADVIL,MOTRIN) 200 MG tablet [IBUPROFEN (ADVIL,MOTRIN) 200 MG TABLET] Take 400 mg by mouth every 4 (four) hours as needed for pain. (Patient not taking: Reported on 1/27/2023)       lactobacillus combo no.11 15 billion cell CpSP [LACTOBACILLUS COMBO NO.11 15 BILLION CELL CPSP] Take 1 capsule by mouth daily.  (Patient not taking: Reported on 1/27/2023)       magnesium oxide (MAG-OX) 400 mg tablet [MAGNESIUM OXIDE (MAG-OX) 400 MG TABLET] Take 400 mg by mouth daily.         Allergies:   Patient has no known allergies.    Review of Systems:     Positive for weight gain, hearing loss, shortness of breath with activity, arm pain, wheezing, leg swelling, nocturia, joint pains, muscle weakness, daytime sleepiness, loss of balance, anxiety, easy bruising.  12 point review of systems otherwise negative.     Please refer above for cardiac ROS details.       Objective:      Physical Exam  61.2 kg (135 lb)  1.524 m (5')  Body mass index is 26.37 kg/m .  BP (!) 166/113   Pulse 88   Ht 1.524 m (5')   Wt 61.2 kg (135 lb)   SpO2 94%   BMI 26.37 kg/m          General Appearance : Awake, Alert, No acute distress  HEENT: No Scleral icterus; the mucous membranes were pink and moist.  Conjunctivae mildly injected  Neck:  No cervical bruits, jugular venous distention, or thyromegaly   Chest: The spine was straight. Chest wall symmetric  Lungs: Respirations unlabored; the lungs are clear to auscultation.  No wheezing  Cardiovascular:   Normal point of maximal impulse.  Auscultation reveals regular first and second heart sounds with no murmurs, rubs, or gallops.  Occasional extrasystoles.  Carotid, radial, and dorsalis pedal pulses are intact and symmetric.    Abdomen: No organomegaly, masses, bruits, or  tenderness. Bowels sounds are present  Extremities: Trace pretibial edema  Skin: No xanthelasma. Warm, Dry.  Musculoskeletal: No tenderness.  Neurologic: Alert and oriented ×3. Speech is fluent.      EKG (personally reviewed):  1/20/2023 at [facility]: Atrial fibrillation at 136 bpm.  Today: Sinus rhythm 75 bpm.  Normal ECG    Cardiac Imaging Studies:  Pharmacologic nuclear stress test 5/2022:     Lexiscan stress ECG negative for ischemia.     The nuclear stress test is negative for inducible myocardial ischemia or infarction.     The left ventricular ejection fraction at stress is greater than 70%.     A prior study was conducted on 4/16/2019.  No interval change.    CT chest PE run 5/2022:CORONARY ARTERY CALCIFICATION: Cannot evaluate.  1.  No pulmonary embolism.      Lab Review   Lab Results   Component Value Date     05/16/2022    CO2 27 05/16/2022    BUN 9 05/16/2022     Lab Results   Component Value Date    WBC 7.7 05/16/2022    HGB 15.3 05/16/2022    HCT 45.1 05/16/2022    MCV 95 05/16/2022     05/16/2022     Lab Results   Component Value Date    CHOL 115 05/16/2022    TRIG 106 05/16/2022    HDL 44 05/16/2022     Lab Results   Component Value Date    TROPONINI <0.01 05/05/2022     Lab Results   Component Value Date    BNP 51 05/05/2022     Lab Results   Component Value Date    TSH 0.64 12/15/2021       Guille Aguilar MD, MD [facility]      This note created using Dragon voice recognition software. Sound alike errors may have escaped editing.     Thank you for allowing me to participate in the care of your patient.      Sincerely,     Guille Aguilar MD     [facility]  cc:   Dayne Azar MD  [address] [place] [address]

## 2023-01-27 NOTE — PATIENT INSTRUCTIONS
We will check blood work today looking for thyroid problems.  Stop aspirin, continue Eliquis  We will schedule an echocardiogram to look for structural heart problems that might contribute to atrial fibrillation.  Consider sleep apnea evaluation.Please call [phone] to schedule Sleep consultation.  Another option is [facility] at [phone].    Start hydrochlorothiazide 12.5 mg daily in addition to your current medications.    In 1 week we will check blood work on your new medication to make sure your kidney function is tolerating the new medications

## 2023-01-27 NOTE — PROGRESS NOTES
CARDIOLOGY RAPID ACCESS CLINIC CONSULT NOTE     Assessment/Plan:   1.  Paroxysmal atrial fibrillation with rapid ventricular response.  Will check echocardiogram to look for evidence of structural heart disease.  With her [id number] score of at least 7, she merits long-term anticoagulation to prevent recurrent cerebrovascular events.  She is tolerating Eliquis well.  Discussed rationale for its use.  She was advised to discontinue aspirin.  2.  Essential hypertension with poor control today.  Advised low-sodium diet, will add HCTZ 12.5 mg daily to present medical regimen.  3.  Probable obstructive sleep apnea based on symptoms.  Take the liberty of referring her for sleep apnea evaluation.  4.  Dyspnea on exertion.  Likely multifactorial in etiology.  As she has returned to sinus rhythm, clearly her dyspnea is not solely related to paroxysmal atrial fibrillation.  May be combination of atrial fibrillation, poorly controlled hypertension, and obstructive sleep apnea.    Follow-up 3 months or sooner based on test results.     History of Present Illness:     It is my pleasure to see Adilia Eric at the [facility] RAPID ACCESS clinic for evaluation of new onset atrial fibrillation.    Adilia Eric is a 82 year old female with a past medical history of diabetes mellitus type 2, hypertension, was seen by Dr. Workman 5/2022 for evaluation of dyspnea on exertion.  Pharmacologic nuclear stress test was normal at that time.    She reports that years ago she had some symptoms that she does not recall that were felt to be related to a TIA, but denies a history of stroke.  She reports leg weakness related to hip surgery, and walks with a cane.    Over the last several weeks, she has been more short of breath than usual, consistently with activities such as getting up to walk to the bathroom.  She has not experienced any chest pain.  She has had no syncope or falls.  She does notice small amount of  lower extremity edema.  She also has daytime sleepiness, napping often.  She saw Dr. Azar who noted atrial fibrillation with rapid ventricular response at 136 bpm on EKG.  He increased her metoprolol to 50 mg twice daily started Eliquis 5 mg twice daily.  She has not noticed any significant difference on these medications.  She has had no bruising or bleeding.  She has had no  near syncopal spells, or awareness of an irregular heartbeat.  She does not try to limit the sodium content in her diet    Past Medical History:     Patient Active Problem List   Diagnosis     Stroke (H)     Anxiety disorder     Gout     Hypertension     Hyperlipidemia     Paresthesia     Generalized anxiety disorder     Panic disorder without agoraphobia     Adjustment disorder with mixed anxiety and depressed mood     Mood disorder in conditions classified elsewhere     Near syncope     Vaginal discharge       Past Surgical History:     Past Surgical History:   Procedure Laterality Date     CYST REMOVAL      tongue     HYSTERECTOMY  1996     OOPHORECTOMY Bilateral 1996       Family History:     Family History   Problem Relation Age of Onset     Heart Disease Father      No Known Problems Mother      No Known Problems Sister      No Known Problems Daughter      No Known Problems Maternal Grandmother      No Known Problems Maternal Grandfather      No Known Problems Paternal Grandmother      No Known Problems Paternal Grandfather      No Known Problems Maternal Aunt      No Known Problems Paternal Aunt      Hereditary Breast and Ovarian Cancer Syndrome No family hx of      Breast Cancer No family hx of      Cancer No family hx of      Colon Cancer No family hx of      Endometrial Cancer No family hx of      Ovarian Cancer No family hx of      Family history reviewed and is not pertinent to the chief complaint or presenting problem    Social History:    reports that she has quit smoking. She has never used smokeless tobacco. She reports  current alcohol use of about 1.7 standard drinks per week. She reports that she does not use drugs.    Exercise: Walks with cane, limited movement of right leg    Sleep: Poorly restorative supine.  Sleepy during the day, napping often.    Meds:     Current Outpatient Medications   Medication Sig Dispense Refill     acetaminophen (TYLENOL) 500 MG tablet [ACETAMINOPHEN (TYLENOL) 500 MG TABLET] Take 1,000 mg by mouth 2 (two) times a day as needed.        allopurinol (ZYLOPRIM) 100 MG tablet Take 100 mg by mouth daily       aspirin (ASA) 81 MG EC tablet Take 2 tablets (162 mg) by mouth daily       atorvastatin (LIPITOR) 40 MG tablet [ATORVASTATIN (LIPITOR) 40 MG TABLET] Take 40 mg by mouth bedtime.       baclofen (LIORESAL) 10 MG tablet [BACLOFEN (LIORESAL) 10 MG TABLET] Take 10 mg by mouth at bedtime.        calcium carbonate (OS-MARCELO) 600 mg calcium (1,500 mg) tablet [CALCIUM CARBONATE (OS-MARCELO) 600 MG CALCIUM (1,500 MG) TABLET] Take 1 tablet by mouth daily.        celecoxib (CELEBREX) 200 MG capsule [CELECOXIB (CELEBREX) 200 MG CAPSULE] Take 200 mg by mouth daily.       cyanocobalamin 1000 MCG tablet [CYANOCOBALAMIN 1000 MCG TABLET] Take 1,000 mcg by mouth daily.       diphenhydrAMINE (BENADRYL) 25 mg tablet [DIPHENHYDRAMINE (BENADRYL) 25 MG TABLET] Take 25 mg by mouth at bedtime.       lisinopril (ZESTRIL) 10 MG tablet Take 10 mg by mouth daily       LORazepam (ATIVAN) 0.5 MG tablet [LORAZEPAM (ATIVAN) 0.5 MG TABLET] Take 0.5 mg by mouth 2 (two) times a day as needed.        metFORMIN (GLUCOPHAGE-XR) 500 MG 24 hr tablet [METFORMIN (GLUCOPHAGE-XR) 500 MG 24 HR TABLET] TAKE 1 TABLET BY MOUTH EVERY DAY FOR DIABETES  1     metoprolol succinate ER (TOPROL XL) 50 MG 24 hr tablet Take 1 tablet (50 mg) by mouth 2 times daily 180 tablet 11     diclofenac sodium (VOLTAREN) 1 % Gel [DICLOFENAC SODIUM (VOLTAREN) 1 % GEL] Apply topically 4 (four) times a day as needed.        (Patient not taking: Reported on 1/27/2023)        ibuprofen (ADVIL,MOTRIN) 200 MG tablet [IBUPROFEN (ADVIL,MOTRIN) 200 MG TABLET] Take 400 mg by mouth every 4 (four) hours as needed for pain. (Patient not taking: Reported on 1/27/2023)       lactobacillus combo no.11 15 billion cell CpSP [LACTOBACILLUS COMBO NO.11 15 BILLION CELL CPSP] Take 1 capsule by mouth daily.  (Patient not taking: Reported on 1/27/2023)       magnesium oxide (MAG-OX) 400 mg tablet [MAGNESIUM OXIDE (MAG-OX) 400 MG TABLET] Take 400 mg by mouth daily.         Allergies:   Patient has no known allergies.    Review of Systems:     Positive for weight gain, hearing loss, shortness of breath with activity, arm pain, wheezing, leg swelling, nocturia, joint pains, muscle weakness, daytime sleepiness, loss of balance, anxiety, easy bruising.  12 point review of systems otherwise negative.     Please refer above for cardiac ROS details.       Objective:      Physical Exam  61.2 kg (135 lb)  1.524 m (5')  Body mass index is 26.37 kg/m .  BP (!) 166/113   Pulse 88   Ht 1.524 m (5')   Wt 61.2 kg (135 lb)   SpO2 94%   BMI 26.37 kg/m          General Appearance : Awake, Alert, No acute distress  HEENT: No Scleral icterus; the mucous membranes were pink and moist.  Conjunctivae mildly injected  Neck:  No cervical bruits, jugular venous distention, or thyromegaly   Chest: The spine was straight. Chest wall symmetric  Lungs: Respirations unlabored; the lungs are clear to auscultation.  No wheezing  Cardiovascular:   Normal point of maximal impulse.  Auscultation reveals regular first and second heart sounds with no murmurs, rubs, or gallops.  Occasional extrasystoles.  Carotid, radial, and dorsalis pedal pulses are intact and symmetric.    Abdomen: No organomegaly, masses, bruits, or tenderness. Bowels sounds are present  Extremities: Trace pretibial edema  Skin: No xanthelasma. Warm, Dry.  Musculoskeletal: No tenderness.  Neurologic: Alert and oriented ×3. Speech is fluent.      EKG (personally  reviewed):  1/20/2023 at [facility]: Atrial fibrillation at 136 bpm.  Today: Sinus rhythm 75 bpm.  Normal ECG    Cardiac Imaging Studies:  Pharmacologic nuclear stress test 5/2022:     Lexiscan stress ECG negative for ischemia.     The nuclear stress test is negative for inducible myocardial ischemia or infarction.     The left ventricular ejection fraction at stress is greater than 70%.     A prior study was conducted on 4/16/2019.  No interval change.    CT chest PE run 5/2022:CORONARY ARTERY CALCIFICATION: Cannot evaluate.  1.  No pulmonary embolism.      Lab Review   Lab Results   Component Value Date     05/16/2022    CO2 27 05/16/2022    BUN 9 05/16/2022     Lab Results   Component Value Date    WBC 7.7 05/16/2022    HGB 15.3 05/16/2022    HCT 45.1 05/16/2022    MCV 95 05/16/2022     05/16/2022     Lab Results   Component Value Date    CHOL 115 05/16/2022    TRIG 106 05/16/2022    HDL 44 05/16/2022     Lab Results   Component Value Date    TROPONINI <0.01 05/05/2022     Lab Results   Component Value Date    BNP 51 05/05/2022     Lab Results   Component Value Date    TSH 0.64 12/15/2021       Guille Aguilar MD, MD FACC      This note created using Dragon voice recognition software. Sound alike errors may have escaped editing.

## 2023-02-03 ENCOUNTER — LAB (OUTPATIENT)
Dept: LAB | Facility: HOSPITAL | Age: 83
End: 2023-02-03
Payer: COMMERCIAL

## 2023-02-03 DIAGNOSIS — I48.0 PAROXYSMAL ATRIAL FIBRILLATION (H): ICD-10-CM

## 2023-02-03 DIAGNOSIS — I10 PRIMARY HYPERTENSION: ICD-10-CM

## 2023-02-03 DIAGNOSIS — R06.09 DOE (DYSPNEA ON EXERTION): ICD-10-CM

## 2023-02-03 LAB
ANION GAP SERPL CALCULATED.3IONS-SCNC: 10 MMOL/L (ref 7–15)
BUN SERPL-MCNC: 13.7 MG/DL (ref 8–23)
CALCIUM SERPL-MCNC: 11.2 MG/DL (ref 8.8–10.2)
CHLORIDE SERPL-SCNC: 91 MMOL/L (ref 98–107)
CREAT SERPL-MCNC: 0.78 MG/DL (ref 0.51–0.95)
DEPRECATED HCO3 PLAS-SCNC: 29 MMOL/L (ref 22–29)
GFR SERPL CREATININE-BSD FRML MDRD: 75 ML/MIN/1.73M2
GLUCOSE SERPL-MCNC: 146 MG/DL (ref 70–99)
POTASSIUM SERPL-SCNC: 4.2 MMOL/L (ref 3.4–5.3)
SODIUM SERPL-SCNC: 130 MMOL/L (ref 136–145)
TSH SERPL DL<=0.005 MIU/L-ACNC: 1.03 UIU/ML (ref 0.3–4.2)

## 2023-02-03 PROCEDURE — 36415 COLL VENOUS BLD VENIPUNCTURE: CPT

## 2023-02-03 PROCEDURE — 82310 ASSAY OF CALCIUM: CPT

## 2023-02-03 PROCEDURE — 84443 ASSAY THYROID STIM HORMONE: CPT

## 2023-02-05 ENCOUNTER — HEALTH MAINTENANCE LETTER (OUTPATIENT)
Age: 83
End: 2023-02-05

## 2023-02-17 ENCOUNTER — HOSPITAL ENCOUNTER (OUTPATIENT)
Dept: CARDIOLOGY | Facility: HOSPITAL | Age: 83
Discharge: HOME | End: 2023-02-17
Attending: INTERNAL MEDICINE | Admitting: INTERNAL MEDICINE
Payer: COMMERCIAL

## 2023-02-17 DIAGNOSIS — I48.0 PAROXYSMAL ATRIAL FIBRILLATION (H): ICD-10-CM

## 2023-02-17 DIAGNOSIS — R06.09 DOE (DYSPNEA ON EXERTION): ICD-10-CM

## 2023-02-17 DIAGNOSIS — I10 PRIMARY HYPERTENSION: ICD-10-CM

## 2023-02-17 LAB — LVEF ECHO: NORMAL

## 2023-02-17 PROCEDURE — 93306 TTE W/DOPPLER COMPLETE: CPT | Mod: 26 | Performed by: INTERNAL MEDICINE

## 2023-02-17 PROCEDURE — 93306 TTE W/DOPPLER COMPLETE: CPT

## 2023-04-21 ENCOUNTER — OFFICE VISIT (OUTPATIENT)
Dept: CARDIOLOGY | Facility: CLINIC | Age: 83
End: 2023-04-21
Payer: COMMERCIAL

## 2023-04-21 VITALS
RESPIRATION RATE: 16 BRPM | BODY MASS INDEX: 25.52 KG/M2 | DIASTOLIC BLOOD PRESSURE: 87 MMHG | HEART RATE: 69 BPM | SYSTOLIC BLOOD PRESSURE: 147 MMHG | HEIGHT: 60 IN | WEIGHT: 130 LBS

## 2023-04-21 DIAGNOSIS — I10 PRIMARY HYPERTENSION: Primary | ICD-10-CM

## 2023-04-21 DIAGNOSIS — I48.0 PAROXYSMAL ATRIAL FIBRILLATION (H): ICD-10-CM

## 2023-04-21 PROCEDURE — 99214 OFFICE O/P EST MOD 30 MIN: CPT | Performed by: INTERNAL MEDICINE

## 2023-04-21 RX ORDER — LISINOPRIL 20 MG/1
TABLET ORAL
COMMUNITY
Start: 2023-03-05

## 2023-04-21 RX ORDER — LANCETS
EACH MISCELLANEOUS
COMMUNITY
Start: 2023-01-20

## 2023-04-21 RX ORDER — APIXABAN 5 MG/1
TABLET, FILM COATED ORAL
Status: ON HOLD | COMMUNITY
Start: 2023-04-12 | End: 2024-04-11

## 2023-04-21 RX ORDER — AMLODIPINE BESYLATE 5 MG/1
2.5 TABLET ORAL DAILY
Qty: 90 TABLET | Refills: 3 | Status: SHIPPED | OUTPATIENT
Start: 2023-04-21 | End: 2023-05-30

## 2023-04-21 RX ORDER — HYDROXYZINE HYDROCHLORIDE 25 MG/1
TABLET, FILM COATED ORAL
COMMUNITY
Start: 2022-06-13

## 2023-04-21 NOTE — PROGRESS NOTES
Cardiology Clinic Office Note    Assessment / Plan:    1.  Paroxysmal atrial fibrillation.  No symptomatic recurrences.  Suspect may have been because of her intermittent dyspnea which has since resolved.  Continue metoprolol for rate control and hypertension as well as Eliquis.  2.  Hypertension less than ideally controlled.  We will add 2.5 mg of amlodipine to present medical regimen.  3.  Anxiety.  Discussed increased activity, socialization as means to improve upon this.  Encouraged her to follow through with scheduled counseling.    Follow-up 1 year    ______________________________________________________________________    Subjective:    I had the opportunity to see Adilia Eric at the [facility]. Adilia Eric is a 82 year old female with a history of hypertension, dyspnea on exertion with normal sinus stress test 1 year ago, seen 1/2023 for new onset atrial fibrillation.  She was initiated on Eliquis and increased metoprolol.  With daytime fatigue, sleep apnea was suspected and evaluation recommended.  Follow-up echocardiogram showed normal LV function.  She returns today for routine follow-up.  Her son Manuel waits outside the door today    She has not been aware of any episodes of atrial fibrillation since I saw her last.  She does feel that her exertional dyspnea is significantly improved.  She has had no lightheadedness or dizziness.  She does occasionally note that her fingertips are bluish as she is preparing for bed.  There is no pain or lightheadedness or shortness of breath at this time.  She is not particularly cold with this.  She does monitor her blood pressure at home and notes that the tendency has been for her to run high.  She continues to note anxiety, and still feels sad about the loss of Owen years ago.  She resides in her own home alone, but communicates with her children grandchildren and 5 great-grandchildren often.  Does not limit the sodium  content in her diet, frequently eating prepackaged meals.    ______________________________________________________________________    Problem List:  Patient Active Problem List   Diagnosis     Stroke (H)     Anxiety disorder     Gout     Hypertension     Hyperlipidemia     Paresthesia     Generalized anxiety disorder     Panic disorder without agoraphobia     Adjustment disorder with mixed anxiety and depressed mood     Mood disorder in conditions classified elsewhere     Near syncope     Vaginal discharge     Medical History:  Past Medical History:   Diagnosis Date     Depression      Diabetes mellitus (H)      GERD (gastroesophageal reflux disease)      Hyperlipemia      Hypertension      Intracranial hemorrhage (H) 2011    may be amyloid angiopathy; has been stable.     Mastoiditis 2011     Osteoarthritis      TMJ (temporomandibular joint disorder)      Surgical History:  Past Surgical History:   Procedure Laterality Date     CYST REMOVAL      tongue     HYSTERECTOMY  1996     OOPHORECTOMY Bilateral 1996     Social History:  Social History     Socioeconomic History     Marital status:      Spouse name: Not on file     Number of children: Not on file     Years of education: Not on file     Highest education level: Not on file   Occupational History     Not on file   Tobacco Use     Smoking status: Former     Smokeless tobacco: Never   Vaping Use     Vaping status: Not on file   Substance and Sexual Activity     Alcohol use: Yes     Alcohol/week: 1.7 standard drinks of alcohol     Drug use: No     Sexual activity: Not on file   Other Topics Concern     Not on file   Social History Narrative     Not on file     Social Determinants of Health     Financial Resource Strain: Not on file   Food Insecurity: Not on file   Transportation Needs: Not on file   Physical Activity: Not on file   Stress: Not on file   Social Connections: Not on file   Intimate Partner Violence: Not on file   Housing Stability: Not on file      Sleep History:  Restorative, takes a nap often but does take lorazepam most days for anxiety  Exercise History:  Uses a pedal device in her home while watching television.  Stable activity tolerance    Review of Systems:      Positive for leg swelling which resolves overnight, hearing loss, cough, daytime sleepiness, anxiety, easy bruising.     Please refer above for cardiac ROS details.         Family History:  Family History   Problem Relation Age of Onset     Heart Disease Father      No Known Problems Mother      No Known Problems Sister      No Known Problems Daughter      No Known Problems Maternal Grandmother      No Known Problems Maternal Grandfather      No Known Problems Paternal Grandmother      No Known Problems Paternal Grandfather      No Known Problems Maternal Aunt      No Known Problems Paternal Aunt      Hereditary Breast and Ovarian Cancer Syndrome No family hx of      Breast Cancer No family hx of      Cancer No family hx of      Colon Cancer No family hx of      Endometrial Cancer No family hx of      Ovarian Cancer No family hx of          Allergies:  Allergies   Allergen Reactions     Seasonal Allergies      Medications:  Current Outpatient Medications   Medication Sig Dispense Refill     acetaminophen (TYLENOL) 500 MG tablet [ACETAMINOPHEN (TYLENOL) 500 MG TABLET] Take 1,000 mg by mouth 2 (two) times a day as needed.        allopurinol (ZYLOPRIM) 100 MG tablet Take 100 mg by mouth daily       atorvastatin (LIPITOR) 40 MG tablet [ATORVASTATIN (LIPITOR) 40 MG TABLET] Take 40 mg by mouth bedtime.       baclofen (LIORESAL) 10 MG tablet [BACLOFEN (LIORESAL) 10 MG TABLET] Take 10 mg by mouth at bedtime.        calcium carbonate (OS-MARCELO) 600 mg calcium (1,500 mg) tablet [CALCIUM CARBONATE (OS-MARCELO) 600 MG CALCIUM (1,500 MG) TABLET] Take 1 tablet by mouth daily.        celecoxib (CELEBREX) 200 MG capsule [CELECOXIB (CELEBREX) 200 MG CAPSULE] Take 200 mg by mouth daily.       CONTOUR NEXT TEST  test strip 2 times daily       cyanocobalamin 1000 MCG tablet [CYANOCOBALAMIN 1000 MCG TABLET] Take 1,000 mcg by mouth daily.       diphenhydrAMINE (BENADRYL) 25 mg tablet [DIPHENHYDRAMINE (BENADRYL) 25 MG TABLET] Take 25 mg by mouth at bedtime.       ELIQUIS ANTICOAGULANT 5 MG tablet TAKE 1 TABLET BY MOUTH TWICE DAILY TO THIN BLOOD       hydrochlorothiazide (MICROZIDE) 12.5 MG capsule Take 1 capsule (12.5 mg) by mouth every morning 90 capsule 3     hydrOXYzine (ATARAX) 25 MG tablet TAKE 1 TABLET BY MOUTH UP TO 3 TIMES DAILY AS NEEDED FOR ANXIETY       lactobacillus combo no.11 15 billion cell CpSP Take 1 capsule by mouth daily       lisinopril (ZESTRIL) 20 MG tablet TAKE 1 TABLET BY MOUTH TWICE A DAY FOR BLOOD PRESSURE       LORazepam (ATIVAN) 0.5 MG tablet [LORAZEPAM (ATIVAN) 0.5 MG TABLET] Take 0.5 mg by mouth 2 (two) times a day as needed.        magnesium oxide (MAG-OX) 400 mg tablet [MAGNESIUM OXIDE (MAG-OX) 400 MG TABLET] Take 400 mg by mouth daily.       metFORMIN (GLUCOPHAGE-XR) 500 MG 24 hr tablet [METFORMIN (GLUCOPHAGE-XR) 500 MG 24 HR TABLET] TAKE 1 TABLET BY MOUTH EVERY DAY FOR DIABETES  1     metoprolol succinate ER (TOPROL XL) 50 MG 24 hr tablet Take 1 tablet (50 mg) by mouth 2 times daily 180 tablet 11     Microlet Lancets MISC TEST BLOOD SUGAR 2 X'S DAILY DX: E11.9       diclofenac sodium (VOLTAREN) 1 % Gel Apply topically 4 times daily as needed (Patient not taking: Reported on 4/21/2023)         Objective:   Wt Readings from Last 3 Encounters:   04/21/23 59 kg (130 lb)   01/27/23 61.2 kg (135 lb)   05/11/22 59.4 kg (131 lb)     Vital signs:  BP (!) 147/87 (BP Location: Left arm, Patient Position: Sitting, Cuff Size: Adult Regular)   Pulse 69   Resp 16   Ht 1.524 m (5')   Wt 59 kg (130 lb)   BMI 25.39 kg/m        Physical Exam:    General Appearance : Awake, Alert, No acute distress  HEENT: No Scleral icterus; the mucous membranes were pink and moist.  Conjunctivae not injected  Neck:  No  cervical bruits, jugular venous distention, or thyromegaly   Chest: The spine was straight. Chest wall symmetric  Lungs: Respirations unlabored; the lungs are clear to auscultation.  No wheezing   Cardiovascular:   Normal point of maximal impulse.  Auscultation reveals regular first and second heart sounds with no murmurs, rubs, or gallops.  Carotid, radial, and dorsalis pedal pulses are intact and symmetric.    Abdomen: No organomegaly, masses, bruits, or tenderness. Bowels sounds are present  Extremities:  No clubbing, cyanosis.  No edema  Skin: No rash, bruising  Musculoskeletal: No tenderness.  Neurologic: Alert and oriented ×3. Speech is fluent.    Lab Results:  LIPIDS:  Lab Results   Component Value Date    CHOL 115 05/16/2022    CHOL 121 07/31/2020    CHOL 128 07/18/2018     Lab Results   Component Value Date    HDL 44 (L) 05/16/2022    HDL 42 (L) 07/31/2020    HDL 39 (L) 07/18/2018     Lab Results   Component Value Date    LDL 50 05/16/2022    LDL 57 07/31/2020    LDL 58 07/18/2018     Lab Results   Component Value Date    TRIG 106 05/16/2022    TRIG 108 07/31/2020    TRIG 156 (H) 07/18/2018       Echocardiogram 2/2023:  Left ventricular size, wall motion and function are normal. The ejection  fraction is 55-60%.  Normal right ventricle size and systolic function.  Both atria are of normal size.  No hemodynamically significant valvular abnormalities on 2D or color flow  imaging.  There is no comparison study available.    Pharmacologic nuclear stress test 5/2022:     Lexiscan stress ECG negative for ischemia.     The nuclear stress test is negative for inducible myocardial ischemia or infarction.     The left ventricular ejection fraction at stress is greater than 70%.     A prior study was conducted on 4/16/2019.  No interval change.      CT chest PE run 5/2022:  CORONARY ARTERY CALCIFICATION: Cannot evaluate.  1.  No pulmonary embolism.      COLBY GARCIA MD [facility]  [phone]    This note created  using Dragon voice recognition software.  Sound alike errors may have escaped editing.

## 2023-04-21 NOTE — LETTER
4/21/2023    Dayne Azar MD  [address] [place] [address]    RE: Adilia MARK Jeaneth       Dear Colleague,     I had the pleasure of seeing Adilia Eric in the [facility].    Cardiology Clinic Office Note    Assessment / Plan:    1.  Paroxysmal atrial fibrillation.  No symptomatic recurrences.  Suspect may have been because of her intermittent dyspnea which has since resolved.  Continue metoprolol for rate control and hypertension as well as Eliquis.  2.  Hypertension less than ideally controlled.  We will add 2.5 mg of amlodipine to present medical regimen.  3.  Anxiety.  Discussed increased activity, socialization as means to improve upon this.  Encouraged her to follow through with scheduled counseling.    Follow-up 1 year    ______________________________________________________________________    Subjective:    I had the opportunity to see Adilia Eric at the [facility]. Adilia Eric is a 82 year old female with a history of hypertension, dyspnea on exertion with normal sinus stress test 1 year ago, seen 1/2023 for new onset atrial fibrillation.  She was initiated on Eliquis and increased metoprolol.  With daytime fatigue, sleep apnea was suspected and evaluation recommended.  Follow-up echocardiogram showed normal LV function.  She returns today for routine follow-up.  Her son Manuel waits outside the door today    She has not been aware of any episodes of atrial fibrillation since I saw her last.  She does feel that her exertional dyspnea is significantly improved.  She has had no lightheadedness or dizziness.  She does occasionally note that her fingertips are bluish as she is preparing for bed.  There is no pain or lightheadedness or shortness of breath at this time.  She is not particularly cold with this.  She does monitor her blood pressure at home and notes that the tendency has been for her to run high.   She continues to note anxiety, and still feels sad about the loss of Owen years ago.  She resides in her own home alone, but communicates with her children grandchildren and 5 great-grandchildren often.  Does not limit the sodium content in her diet, frequently eating prepackaged meals.    ______________________________________________________________________    Problem List:  Patient Active Problem List   Diagnosis    Stroke (H)    Anxiety disorder    Gout    Hypertension    Hyperlipidemia    Paresthesia    Generalized anxiety disorder    Panic disorder without agoraphobia    Adjustment disorder with mixed anxiety and depressed mood    Mood disorder in conditions classified elsewhere    Near syncope    Vaginal discharge     Medical History:  Past Medical History:   Diagnosis Date    Depression     Diabetes mellitus (H)     GERD (gastroesophageal reflux disease)     Hyperlipemia     Hypertension     Intracranial hemorrhage (H) 2011    may be amyloid angiopathy; has been stable.    Mastoiditis 2011    Osteoarthritis     TMJ (temporomandibular joint disorder)      Surgical History:  Past Surgical History:   Procedure Laterality Date    CYST REMOVAL      tongue    HYSTERECTOMY  1996    OOPHORECTOMY Bilateral 1996     Social History:  Social History     Socioeconomic History    Marital status:      Spouse name: Not on file    Number of children: Not on file    Years of education: Not on file    Highest education level: Not on file   Occupational History    Not on file   Tobacco Use    Smoking status: Former    Smokeless tobacco: Never   Vaping Use    Vaping status: Not on file   Substance and Sexual Activity    Alcohol use: Yes     Alcohol/week: 1.7 standard drinks of alcohol    Drug use: No    Sexual activity: Not on file   Other Topics Concern    Not on file   Social History Narrative    Not on file     Social Determinants of Health     Financial Resource Strain: Not on file   Food Insecurity: Not on file    Transportation Needs: Not on file   Physical Activity: Not on file   Stress: Not on file   Social Connections: Not on file   Intimate Partner Violence: Not on file   Housing Stability: Not on file     Sleep History:  Restorative, takes a nap often but does take lorazepam most days for anxiety  Exercise History:  Uses a pedal device in her home while watching television.  Stable activity tolerance    Review of Systems:      Positive for leg swelling which resolves overnight, hearing loss, cough, daytime sleepiness, anxiety, easy bruising.     Please refer above for cardiac ROS details.         Family History:  Family History   Problem Relation Age of Onset    Heart Disease Father     No Known Problems Mother     No Known Problems Sister     No Known Problems Daughter     No Known Problems Maternal Grandmother     No Known Problems Maternal Grandfather     No Known Problems Paternal Grandmother     No Known Problems Paternal Grandfather     No Known Problems Maternal Aunt     No Known Problems Paternal Aunt     Hereditary Breast and Ovarian Cancer Syndrome No family hx of     Breast Cancer No family hx of     Cancer No family hx of     Colon Cancer No family hx of     Endometrial Cancer No family hx of     Ovarian Cancer No family hx of          Allergies:  Allergies   Allergen Reactions    Seasonal Allergies      Medications:  Current Outpatient Medications   Medication Sig Dispense Refill    acetaminophen (TYLENOL) 500 MG tablet [ACETAMINOPHEN (TYLENOL) 500 MG TABLET] Take 1,000 mg by mouth 2 (two) times a day as needed.       allopurinol (ZYLOPRIM) 100 MG tablet Take 100 mg by mouth daily      atorvastatin (LIPITOR) 40 MG tablet [ATORVASTATIN (LIPITOR) 40 MG TABLET] Take 40 mg by mouth bedtime.      baclofen (LIORESAL) 10 MG tablet [BACLOFEN (LIORESAL) 10 MG TABLET] Take 10 mg by mouth at bedtime.       calcium carbonate (OS-MARCELO) 600 mg calcium (1,500 mg) tablet [CALCIUM CARBONATE (OS-MARCELO) 600 MG CALCIUM (1,500  MG) TABLET] Take 1 tablet by mouth daily.       celecoxib (CELEBREX) 200 MG capsule [CELECOXIB (CELEBREX) 200 MG CAPSULE] Take 200 mg by mouth daily.      CONTOUR NEXT TEST test strip 2 times daily      cyanocobalamin 1000 MCG tablet [CYANOCOBALAMIN 1000 MCG TABLET] Take 1,000 mcg by mouth daily.      diphenhydrAMINE (BENADRYL) 25 mg tablet [DIPHENHYDRAMINE (BENADRYL) 25 MG TABLET] Take 25 mg by mouth at bedtime.      ELIQUIS ANTICOAGULANT 5 MG tablet TAKE 1 TABLET BY MOUTH TWICE DAILY TO THIN BLOOD      hydrochlorothiazide (MICROZIDE) 12.5 MG capsule Take 1 capsule (12.5 mg) by mouth every morning 90 capsule 3    hydrOXYzine (ATARAX) 25 MG tablet TAKE 1 TABLET BY MOUTH UP TO 3 TIMES DAILY AS NEEDED FOR ANXIETY      lactobacillus combo no.11 15 billion cell CpSP Take 1 capsule by mouth daily      lisinopril (ZESTRIL) 20 MG tablet TAKE 1 TABLET BY MOUTH TWICE A DAY FOR BLOOD PRESSURE      LORazepam (ATIVAN) 0.5 MG tablet [LORAZEPAM (ATIVAN) 0.5 MG TABLET] Take 0.5 mg by mouth 2 (two) times a day as needed.       magnesium oxide (MAG-OX) 400 mg tablet [MAGNESIUM OXIDE (MAG-OX) 400 MG TABLET] Take 400 mg by mouth daily.      metFORMIN (GLUCOPHAGE-XR) 500 MG 24 hr tablet [METFORMIN (GLUCOPHAGE-XR) 500 MG 24 HR TABLET] TAKE 1 TABLET BY MOUTH EVERY DAY FOR DIABETES  1    metoprolol succinate ER (TOPROL XL) 50 MG 24 hr tablet Take 1 tablet (50 mg) by mouth 2 times daily 180 tablet 11    Microlet Lancets MISC TEST BLOOD SUGAR 2 X'S DAILY DX: E11.9      diclofenac sodium (VOLTAREN) 1 % Gel Apply topically 4 times daily as needed (Patient not taking: Reported on 4/21/2023)         Objective:   Wt Readings from Last 3 Encounters:   04/21/23 59 kg (130 lb)   01/27/23 61.2 kg (135 lb)   05/11/22 59.4 kg (131 lb)     Vital signs:  BP (!) 147/87 (BP Location: Left arm, Patient Position: Sitting, Cuff Size: Adult Regular)   Pulse 69   Resp 16   Ht 1.524 m (5')   Wt 59 kg (130 lb)   BMI 25.39 kg/m        Physical  Exam:    General Appearance : Awake, Alert, No acute distress  HEENT: No Scleral icterus; the mucous membranes were pink and moist.  Conjunctivae not injected  Neck:  No cervical bruits, jugular venous distention, or thyromegaly   Chest: The spine was straight. Chest wall symmetric  Lungs: Respirations unlabored; the lungs are clear to auscultation.  No wheezing   Cardiovascular:   Normal point of maximal impulse.  Auscultation reveals regular first and second heart sounds with no murmurs, rubs, or gallops.  Carotid, radial, and dorsalis pedal pulses are intact and symmetric.    Abdomen: No organomegaly, masses, bruits, or tenderness. Bowels sounds are present  Extremities:  No clubbing, cyanosis.  No edema  Skin: No rash, bruising  Musculoskeletal: No tenderness.  Neurologic: Alert and oriented ×3. Speech is fluent.    Lab Results:  LIPIDS:  Lab Results   Component Value Date    CHOL 115 05/16/2022    CHOL 121 07/31/2020    CHOL 128 07/18/2018     Lab Results   Component Value Date    HDL 44 (L) 05/16/2022    HDL 42 (L) 07/31/2020    HDL 39 (L) 07/18/2018     Lab Results   Component Value Date    LDL 50 05/16/2022    LDL 57 07/31/2020    LDL 58 07/18/2018     Lab Results   Component Value Date    TRIG 106 05/16/2022    TRIG 108 07/31/2020    TRIG 156 (H) 07/18/2018       Echocardiogram 2/2023:  Left ventricular size, wall motion and function are normal. The ejection  fraction is 55-60%.  Normal right ventricle size and systolic function.  Both atria are of normal size.  No hemodynamically significant valvular abnormalities on 2D or color flow  imaging.  There is no comparison study available.    Pharmacologic nuclear stress test 5/2022:    Lexiscan stress ECG negative for ischemia.    The nuclear stress test is negative for inducible myocardial ischemia or infarction.    The left ventricular ejection fraction at stress is greater than 70%.    A prior study was conducted on 4/16/2019.  No interval change.      CT  chest PE run 5/2022:  CORONARY ARTERY CALCIFICATION: Cannot evaluate.  1.  No pulmonary embolism.      COLBY GARCIA MD [facility]  [phone]    This note created using Dragon voice recognition software.  Sound alike errors may have escaped editing.            Thank you for allowing me to participate in the care of your patient.      Sincerely,     Colby Garcia MD      [facility]  cc:   No referring provider defined for this encounter.

## 2023-04-21 NOTE — PATIENT INSTRUCTIONS
Start amlodipine 2.5 mg daily in addition to your current medications to help with blood pressure control.  Try to get 150 minutes of activity in each week.  Your pedaling counts, but walking outside or at a [facility] might be a more pleasant social experience!  Your daytime sleepiness could be due to the lorazepam or Atarax!

## 2023-05-11 ENCOUNTER — LAB REQUISITION (OUTPATIENT)
Dept: LAB | Facility: CLINIC | Age: 83
End: 2023-05-11

## 2023-05-11 DIAGNOSIS — E78.2 MIXED HYPERLIPIDEMIA: ICD-10-CM

## 2023-05-11 DIAGNOSIS — E83.52 HYPERCALCEMIA: ICD-10-CM

## 2023-05-11 DIAGNOSIS — I10 ESSENTIAL (PRIMARY) HYPERTENSION: ICD-10-CM

## 2023-05-11 DIAGNOSIS — E79.0 HYPERURICEMIA WITHOUT SIGNS OF INFLAMMATORY ARTHRITIS AND TOPHACEOUS DISEASE: ICD-10-CM

## 2023-05-11 LAB
ALBUMIN SERPL BCG-MCNC: 4.6 G/DL (ref 3.5–5.2)
ALP SERPL-CCNC: 98 U/L (ref 35–104)
ALT SERPL W P-5'-P-CCNC: 24 U/L (ref 10–35)
ANION GAP SERPL CALCULATED.3IONS-SCNC: 14 MMOL/L (ref 7–15)
AST SERPL W P-5'-P-CCNC: 24 U/L (ref 10–35)
BASOPHILS # BLD AUTO: 0.1 10E3/UL (ref 0–0.2)
BASOPHILS NFR BLD AUTO: 1 %
BILIRUB SERPL-MCNC: 2.1 MG/DL
BUN SERPL-MCNC: 11.1 MG/DL (ref 8–23)
CALCIUM SERPL-MCNC: 10.3 MG/DL (ref 8.8–10.2)
CHLORIDE SERPL-SCNC: 90 MMOL/L (ref 98–107)
CHOLEST SERPL-MCNC: 117 MG/DL
CREAT SERPL-MCNC: 0.72 MG/DL (ref 0.51–0.95)
DEPRECATED HCO3 PLAS-SCNC: 25 MMOL/L (ref 22–29)
EOSINOPHIL # BLD AUTO: 0.3 10E3/UL (ref 0–0.7)
EOSINOPHIL NFR BLD AUTO: 4 %
ERYTHROCYTE [DISTWIDTH] IN BLOOD BY AUTOMATED COUNT: 13.7 % (ref 10–15)
GFR SERPL CREATININE-BSD FRML MDRD: 83 ML/MIN/1.73M2
GLUCOSE SERPL-MCNC: 133 MG/DL (ref 70–99)
HCT VFR BLD AUTO: 44.9 % (ref 35–47)
HDLC SERPL-MCNC: 51 MG/DL
HGB BLD-MCNC: 14.7 G/DL (ref 11.7–15.7)
IMM GRANULOCYTES # BLD: 0.1 10E3/UL
IMM GRANULOCYTES NFR BLD: 1 %
LDLC SERPL CALC-MCNC: 43 MG/DL
LYMPHOCYTES # BLD AUTO: 1.6 10E3/UL (ref 0.8–5.3)
LYMPHOCYTES NFR BLD AUTO: 21 %
MCH RBC QN AUTO: 31.3 PG (ref 26.5–33)
MCHC RBC AUTO-ENTMCNC: 32.7 G/DL (ref 31.5–36.5)
MCV RBC AUTO: 96 FL (ref 78–100)
MONOCYTES # BLD AUTO: 0.8 10E3/UL (ref 0–1.3)
MONOCYTES NFR BLD AUTO: 10 %
NEUTROPHILS # BLD AUTO: 4.9 10E3/UL (ref 1.6–8.3)
NEUTROPHILS NFR BLD AUTO: 63 %
NONHDLC SERPL-MCNC: 66 MG/DL
NRBC # BLD AUTO: 0 10E3/UL
NRBC BLD AUTO-RTO: 0 /100
PHQ9 SCORE: 3
PLATELET # BLD AUTO: 275 10E3/UL (ref 150–450)
POTASSIUM SERPL-SCNC: 4.3 MMOL/L (ref 3.4–5.3)
PROT SERPL-MCNC: 6.8 G/DL (ref 6.4–8.3)
PTH-INTACT SERPL-MCNC: 40 PG/ML (ref 15–65)
RBC # BLD AUTO: 4.7 10E6/UL (ref 3.8–5.2)
SODIUM SERPL-SCNC: 129 MMOL/L (ref 136–145)
TRIGL SERPL-MCNC: 113 MG/DL
TSH SERPL DL<=0.005 MIU/L-ACNC: 0.96 UIU/ML (ref 0.3–4.2)
URATE SERPL-MCNC: 4.2 MG/DL (ref 2.4–5.7)
WBC # BLD AUTO: 7.7 10E3/UL (ref 4–11)

## 2023-05-11 PROCEDURE — 83970 ASSAY OF PARATHORMONE: CPT | Performed by: FAMILY MEDICINE

## 2023-05-11 PROCEDURE — 85025 COMPLETE CBC W/AUTO DIFF WBC: CPT | Performed by: FAMILY MEDICINE

## 2023-05-11 PROCEDURE — 84443 ASSAY THYROID STIM HORMONE: CPT | Performed by: FAMILY MEDICINE

## 2023-05-11 PROCEDURE — 84550 ASSAY OF BLOOD/URIC ACID: CPT | Performed by: FAMILY MEDICINE

## 2023-05-11 PROCEDURE — 80061 LIPID PANEL: CPT | Performed by: FAMILY MEDICINE

## 2023-05-11 PROCEDURE — 80053 COMPREHEN METABOLIC PANEL: CPT | Performed by: FAMILY MEDICINE

## 2023-05-14 ENCOUNTER — HEALTH MAINTENANCE LETTER (OUTPATIENT)
Age: 83
End: 2023-05-14

## 2023-05-30 DIAGNOSIS — I10 PRIMARY HYPERTENSION: ICD-10-CM

## 2023-05-30 DIAGNOSIS — I48.0 PAROXYSMAL ATRIAL FIBRILLATION (H): ICD-10-CM

## 2023-05-30 RX ORDER — AMLODIPINE BESYLATE 2.5 MG/1
2.5 TABLET ORAL DAILY
Qty: 30 TABLET | Refills: 1 | Status: SHIPPED | OUTPATIENT
Start: 2023-05-30 | End: 2023-06-21

## 2023-05-30 NOTE — TELEPHONE ENCOUNTER
PC to Adilia to clarify her Amlopdipine dose schedule. Adilia stated that she has been taking 1 tab(5 mg) each day. Masha denied any lightheadedness or other symptoms. She said her BP was 129/73 this a.m., and also stated that the pills don't cut easily and she can't find her pill cutter. Writer sent 30 day refill for Amlodipine 2.5 mg tabs and instructed Masha she will take 1 tab/day. She verbalized understanding and agreement to plan. Message sent to Dr Aguilar and his nurse Delfina as an FYI, and for further action if need be.  -sea

## 2023-06-04 ENCOUNTER — HEALTH MAINTENANCE LETTER (OUTPATIENT)
Age: 83
End: 2023-06-04

## 2023-07-26 ENCOUNTER — APPOINTMENT (OUTPATIENT)
Dept: CT IMAGING | Facility: HOSPITAL | Age: 83
End: 2023-07-26
Attending: EMERGENCY MEDICINE
Payer: COMMERCIAL

## 2023-07-26 ENCOUNTER — HOSPITAL ENCOUNTER (EMERGENCY)
Facility: HOSPITAL | Age: 83
Discharge: HOME | End: 2023-07-26
Attending: EMERGENCY MEDICINE | Admitting: EMERGENCY MEDICINE
Payer: COMMERCIAL

## 2023-07-26 VITALS
DIASTOLIC BLOOD PRESSURE: 90 MMHG | RESPIRATION RATE: 21 BRPM | TEMPERATURE: 97.9 F | OXYGEN SATURATION: 96 % | HEART RATE: 89 BPM | SYSTOLIC BLOOD PRESSURE: 154 MMHG
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
## 2023-07-26 DIAGNOSIS — N30.00 ACUTE CYSTITIS WITHOUT HEMATURIA: ICD-10-CM

## 2023-07-26 DIAGNOSIS — S09.90XA INJURY OF HEAD, INITIAL ENCOUNTER: ICD-10-CM

## 2023-07-26 LAB
ALBUMIN UR-MCNC: NEGATIVE MG/DL
APPEARANCE UR: CLEAR
BACTERIA #/AREA URNS HPF: ABNORMAL /HPF
BILIRUB UR QL STRIP: NEGATIVE
COLOR UR AUTO: ABNORMAL
GLUCOSE UR STRIP-MCNC: NEGATIVE MG/DL
HGB UR QL STRIP: NEGATIVE
HYALINE CASTS: 5 /LPF
KETONES UR STRIP-MCNC: NEGATIVE MG/DL
LEUKOCYTE ESTERASE UR QL STRIP: ABNORMAL
MUCOUS THREADS #/AREA URNS LPF: PRESENT /LPF
NITRATE UR QL: NEGATIVE
PH UR STRIP: 7 [PH] (ref 5–7)
RBC URINE: 2 /HPF
SP GR UR STRIP: 1.01 (ref 1–1.03)
SQUAMOUS EPITHELIAL: <1 /HPF
TRANSITIONAL EPI: 1 /HPF
UROBILINOGEN UR STRIP-MCNC: <2 MG/DL
WBC URINE: 9 /HPF

## 2023-07-26 PROCEDURE — 250N000013 HC RX MED GY IP 250 OP 250 PS 637: Performed by: EMERGENCY MEDICINE

## 2023-07-26 PROCEDURE — 81001 URINALYSIS AUTO W/SCOPE: CPT | Performed by: EMERGENCY MEDICINE

## 2023-07-26 PROCEDURE — 70450 CT HEAD/BRAIN W/O DYE: CPT

## 2023-07-26 PROCEDURE — 93005 ELECTROCARDIOGRAM TRACING: CPT | Performed by: STUDENT IN AN ORGANIZED HEALTH CARE EDUCATION/TRAINING PROGRAM

## 2023-07-26 PROCEDURE — 87086 URINE CULTURE/COLONY COUNT: CPT | Performed by: EMERGENCY MEDICINE

## 2023-07-26 PROCEDURE — 72125 CT NECK SPINE W/O DYE: CPT

## 2023-07-26 PROCEDURE — 99285 EMERGENCY DEPT VISIT HI MDM: CPT | Mod: 25

## 2023-07-26 RX ORDER — NITROFURANTOIN 25; 75 MG/1; MG/1
100 CAPSULE ORAL 2 TIMES DAILY
Qty: 14 CAPSULE | Refills: 0 | Status: SHIPPED | OUTPATIENT
Start: 2023-07-26 | End: 2023-12-21

## 2023-07-26 RX ORDER — ACETAMINOPHEN 325 MG/1
975 TABLET ORAL ONCE
Status: COMPLETED | OUTPATIENT
Start: 2023-07-26 | End: 2023-07-26

## 2023-07-26 RX ADMIN — ACETAMINOPHEN 975 MG: 325 TABLET ORAL at 11:10

## 2023-07-26 ASSESSMENT — ACTIVITIES OF DAILY LIVING (ADL)
ADLS_ACUITY_SCORE: 35
ADLS_ACUITY_SCORE: 35

## 2023-07-26 NOTE — ED PROVIDER NOTES
EMERGENCY DEPARTMENT ENCOUNTER      NAME: Adilia Eric  AGE: 82 year old female  YOB: 1940  MRN: [id number]  EVALUATION DATE & TIME: 7/26/2023 10:52 AM    PCP: Dayne Azar    ED PROVIDER: Nikki Alford MD      Chief Complaint   Patient presents with    Trauma    Fall         FINAL IMPRESSION:  1. Fall, initial encounter    2. Injury of head, initial encounter          ED COURSE & MEDICAL DECISION MAKING:    10:51 AM I introduced myself to the patient, obtained patient history, performed a physical exam, and discussed plan for ED workup including potential diagnostic laboratory/imaging studies and interventions.  12:30 PM Patient updated, CT scan head and C-spine unremarkable.   1:12 PM I updated the patient on urinalysis findings.  UA is suspicious for UTI.  In reviewing the patient's chart, she recently had a UTI 3 years ago that grew E. coli with similar UA.  Based on the urine culture at 3 years ago, E. coli was sensitive.  Will initiate the patient on Macrobid.  Patient and patient's son are comfortable with this plan.  I discussed reasons to return including development of headache, dizziness, lightheadedness, new neurologic symptoms.  Patient and patient's son are in agreement with this plan.    Pertinent Labs & Imaging studies reviewed. (See chart for details)  82 year old female presents to the Emergency Department for evaluation after falling and hitting your head on the left side on the cement.  She denies LOC.  She states that she has a headache on the left side of her forehead where she hit.  She was not feeling lightheaded beforehand.  She denies feeling dizzy or lightheaded now.  She is on Eliquis.  She denies injury elsewhere.  She normally ambulates with a cane.  On my evaluation, the patient has a hematoma on the left side of the forehead.  Trauma alert was called given the patient had a traumatic head injury on oral anticoagulation.  Plan for CT scan of the head,  cervical spine to evaluate for acute intracranial hemorrhage, acute fracture.    At the conclusion of the encounter I discussed the results of all of the tests and the disposition. The questions were answered. The patient or family acknowledged understanding and was agreeable with the care plan.       Medical Decision Making    History:  Supplemental history from: Documented in chart, if applicable and EMS  External Record(s) reviewed: Documented in chart, if applicable.    Work Up:  Chart documentation includes differential considered and any EKGs or imaging independently interpreted by provider, where specified.  In additional to work up documented, I considered the following work up: Documented in chart, if applicable.    External consultation:  Discussion of management with another provider: Documented in chart, if applicable    Complicating factors:  Care impacted by chronic illness: Anticoagulated State  Care affected by social determinants of health: Access to Medical Care    Disposition considerations: Discharge. I prescribed additional prescription strength medication(s) as charted. See documentation for any additional details.      MEDICATIONS GIVEN IN THE EMERGENCY:  Medications   acetaminophen (TYLENOL) tablet 975 mg (975 mg Oral $Given 7/26/23 1110)       NEW PRESCRIPTIONS STARTED AT TODAY'S ER VISIT  New Prescriptions    No medications on file          =================================================================    HPI    Patient information was obtained from: Patient and EMS    Use of : N/A         Adilia Eric is a 82 year old female with a pertinent history of hypertension, hyperlipidemia, near syncope, stroke, atrial fibrillation, who presents to this ED via EMS for evaluation of fall.     Patient was walking when she tripped on a ledge, fell forward, and hit the left side of her head on cement. Denies any loss of consciousness. She endorses pain at the site of her trauma but  otherwise has no generalized headache. Denies any nausea, dizziness, or lightheadedness prior to falling. Denies any other pain at the moment. Patient is currently on Eliquis. Patient normally walks with a cane.    PAST MEDICAL HISTORY:  Past Medical History:   Diagnosis Date    Depression     Diabetes mellitus (H)     GERD (gastroesophageal reflux disease)     Hyperlipemia     Hypertension     Intracranial hemorrhage (H) 2011    may be amyloid angiopathy; has been stable.    Mastoiditis 2011    Osteoarthritis     TMJ (temporomandibular joint disorder)        PAST SURGICAL HISTORY:  Past Surgical History:   Procedure Laterality Date    CYST REMOVAL      tongue    HYSTERECTOMY  1996    OOPHORECTOMY Bilateral 1996           CURRENT MEDICATIONS:    acetaminophen (TYLENOL) 500 MG tablet  allopurinol (ZYLOPRIM) 100 MG tablet  amLODIPine (NORVASC) 2.5 MG tablet  atorvastatin (LIPITOR) 40 MG tablet  baclofen (LIORESAL) 10 MG tablet  calcium carbonate (OS-MARCELO) 600 mg calcium (1,500 mg) tablet  celecoxib (CELEBREX) 200 MG capsule  CONTOUR NEXT TEST test strip  cyanocobalamin 1000 MCG tablet  diclofenac sodium (VOLTAREN) 1 % Gel  diphenhydrAMINE (BENADRYL) 25 mg tablet  ELIQUIS ANTICOAGULANT 5 MG tablet  hydrochlorothiazide (MICROZIDE) 12.5 MG capsule  hydrOXYzine (ATARAX) 25 MG tablet  lactobacillus combo no.11 15 billion cell CpSP  lisinopril (ZESTRIL) 20 MG tablet  LORazepam (ATIVAN) 0.5 MG tablet  magnesium oxide (MAG-OX) 400 mg tablet  metFORMIN (GLUCOPHAGE-XR) 500 MG 24 hr tablet  metoprolol succinate ER (TOPROL XL) 50 MG 24 hr tablet  Microlet Lancets MISC        ALLERGIES:  Allergies   Allergen Reactions    Seasonal Allergies        FAMILY HISTORY:  Family History   Problem Relation Age of Onset    Heart Disease Father     No Known Problems Mother     No Known Problems Sister     No Known Problems Daughter     No Known Problems Maternal Grandmother     No Known Problems Maternal Grandfather     No Known Problems  Paternal Grandmother     No Known Problems Paternal Grandfather     No Known Problems Maternal Aunt     No Known Problems Paternal Aunt     Hereditary Breast and Ovarian Cancer Syndrome No family hx of     Breast Cancer No family hx of     Cancer No family hx of     Colon Cancer No family hx of     Endometrial Cancer No family hx of     Ovarian Cancer No family hx of        SOCIAL HISTORY:   Social History     Socioeconomic History    Marital status:    Tobacco Use    Smoking status: Former    Smokeless tobacco: Never   Substance and Sexual Activity    Alcohol use: Yes     Alcohol/week: 1.7 standard drinks of alcohol    Drug use: No       VITALS:  BP (!) 154/90   Pulse 89   Resp 21   SpO2 96%     PHYSICAL EXAM    Gen:  Alert, awake, NAD  HENT:  Head atraumatic, normocephalic.  PERRL.  EOMI.  No periorbital step-offs, depression, tenderness.  No tenderness along the zygomatic arch bilaterally.  Ears atraumatic with no external bleeding or signs of trauma.  No epistaxis.  Clear oropharynx.  Dentition intact.   Respiratory:  Normal respiratory rate.  Lungs CTA.  Chest wall stable to compression.  Nontender chest wall.   Trachea midline.  Cardiovascular:  Regular rate and rhythm.  Palpable radial and DP pulses bilaterally.  Abdomen:  Soft, nontender, normoactive bowel sounds.    Musculoskeletal:  No midline C-spine, T-spine, L-spine tenderness.  No midline spinal step-offs noted.  FROM in all extremities.  5/5 strength in all extremities.  No gross deformities noted.  Pelvis stable.    Integument:  No ecchymosis, abrasions, lacerations noted. Hematoma of the left forehead.    Neuro:  GCS 15, A & O x 3, sensation intact to light touch    LAB:  All pertinent labs reviewed and interpreted.       RADIOLOGY:  Reviewed all pertinent imaging. Please see official radiology report.  Head CT w/o contrast    (Results Pending)   Cervical spine CT w/o contrast    (Results Pending)       EKG:    Performed at: 10:58  AM    Impression: sinus rhythm    Rate: 90 bpm  Rhythm: sinus    MA Interval: 174 ms  QRS Interval: 70 ms  QTc Interval: 403 ms  ST Changes: no acute ischemia  Comparison: no significant change when compared with ECG from 1/27/23    I have independently reviewed and interpreted the EKG(s) documented above.      I, Karolyn Garland, am serving as a scribe to document services personally performed by Nikki Alford, based on my observation and the provider's statements to me. I, Nikki Alford MD, attest that Karolyn Garland is acting in a scribe capacity, has observed my performance of the services and has documented them in accordance with my direction.    Nikki Alford MD  Emergency Medicine  [facility] EMERGENCY DEPARTMENT  [address] [place] [id number]  [phone]     Nikki Alford MD  07/26/23 2346

## 2023-07-26 NOTE — ED NOTES
Bed: [facility]  Expected date:   Expected time:   Means of arrival: Ambulance  Comments:  WBL: Fall, head inj, + thinners

## 2023-07-26 NOTE — ED TRIAGE NOTES
Patient arrives via EMS from home. Pt lives alone and was walking from patio to garage on cement when here legs gave out and she fell face first left side on to it. Pt denies LOC. Pt is on Eliquis. No neck or back pain. Alert and oriented x4. Reports soreness to head. Pt having recent weakness in legs. No vision changes. Hx ofd DM and HTN. . VSS. Pt last ate at aprox 9am. Denies c/p, no sob, no N/V/D/C. No pacemaker, no hx of seizures.     Triage Assessment       Row Name 07/26/23 1059       Triage Assessment (Adult)    Airway WDL WDL       Respiratory WDL    Respiratory WDL WDL       Skin Circulation/Temperature WDL    Skin Circulation/Temperature WDL --  pt has eccymosis to left forehead due to fall       Cardiac WDL    Cardiac WDL WDL       Peripheral/Neurovascular WDL    Peripheral Neurovascular WDL WDL       Cognitive/Neuro/Behavioral WDL    Cognitive/Neuro/Behavioral WDL WDL

## 2023-07-27 LAB
ATRIAL RATE - MUSE: 90 BPM
DIASTOLIC BLOOD PRESSURE - MUSE: 93 MMHG
INTERPRETATION ECG - MUSE: NORMAL
P AXIS - MUSE: 57 DEGREES
PR INTERVAL - MUSE: 174 MS
QRS DURATION - MUSE: 70 MS
QT - MUSE: 330 MS
QTC - MUSE: 403 MS
R AXIS - MUSE: -13 DEGREES
SYSTOLIC BLOOD PRESSURE - MUSE: 146 MMHG
T AXIS - MUSE: 41 DEGREES
VENTRICULAR RATE- MUSE: 90 BPM

## 2023-07-28 LAB — BACTERIA UR CULT: ABNORMAL

## 2023-10-20 DIAGNOSIS — I48.0 PAROXYSMAL ATRIAL FIBRILLATION (H): ICD-10-CM

## 2023-10-20 DIAGNOSIS — I10 PRIMARY HYPERTENSION: ICD-10-CM

## 2023-10-20 RX ORDER — AMLODIPINE BESYLATE 2.5 MG/1
TABLET ORAL
Qty: 90 TABLET | Refills: 2 | Status: SHIPPED | OUTPATIENT
Start: 2023-10-20 | End: 2024-07-15 | Stop reason: DRUGHIGH

## 2023-10-21 ENCOUNTER — HEALTH MAINTENANCE LETTER (OUTPATIENT)
Age: 83
End: 2023-10-21

## 2023-11-13 ENCOUNTER — TRANSFERRED RECORDS (OUTPATIENT)
Dept: HEALTH INFORMATION MANAGEMENT | Facility: CLINIC | Age: 83
End: 2023-11-13

## 2023-11-13 ENCOUNTER — LAB REQUISITION (OUTPATIENT)
Dept: LAB | Facility: CLINIC | Age: 83
End: 2023-11-13

## 2023-11-13 DIAGNOSIS — E87.1 HYPO-OSMOLALITY AND HYPONATREMIA: ICD-10-CM

## 2023-11-13 LAB — HBA1C MFR BLD: 6.2 % (ref 4.2–6.1)

## 2023-11-13 PROCEDURE — 80053 COMPREHEN METABOLIC PANEL: CPT | Performed by: FAMILY MEDICINE

## 2023-11-14 LAB
ALBUMIN SERPL BCG-MCNC: 4.6 G/DL (ref 3.5–5.2)
ALP SERPL-CCNC: 97 U/L (ref 35–104)
ALT SERPL W P-5'-P-CCNC: 21 U/L (ref 0–50)
ANION GAP SERPL CALCULATED.3IONS-SCNC: 13 MMOL/L (ref 7–15)
AST SERPL W P-5'-P-CCNC: 31 U/L (ref 0–45)
BILIRUB SERPL-MCNC: 2.2 MG/DL
BUN SERPL-MCNC: 14.5 MG/DL (ref 8–23)
CALCIUM SERPL-MCNC: 10.6 MG/DL (ref 8.8–10.2)
CHLORIDE SERPL-SCNC: 90 MMOL/L (ref 98–107)
CREAT SERPL-MCNC: 0.8 MG/DL (ref 0.51–0.95)
DEPRECATED HCO3 PLAS-SCNC: 24 MMOL/L (ref 22–29)
EGFRCR SERPLBLD CKD-EPI 2021: 73 ML/MIN/1.73M2
GLUCOSE SERPL-MCNC: 129 MG/DL (ref 70–99)
POTASSIUM SERPL-SCNC: 4.3 MMOL/L (ref 3.4–5.3)
PROT SERPL-MCNC: 7.3 G/DL (ref 6.4–8.3)
SODIUM SERPL-SCNC: 127 MMOL/L (ref 135–145)

## 2023-12-01 ENCOUNTER — TRANSFERRED RECORDS (OUTPATIENT)
Dept: HEALTH INFORMATION MANAGEMENT | Facility: CLINIC | Age: 83
End: 2023-12-01
Payer: COMMERCIAL

## 2023-12-01 LAB — PHQ9 SCORE: 4

## 2023-12-05 ENCOUNTER — TRANSFERRED RECORDS (OUTPATIENT)
Dept: HEALTH INFORMATION MANAGEMENT | Facility: CLINIC | Age: 83
End: 2023-12-05
Payer: COMMERCIAL

## 2023-12-14 DIAGNOSIS — R55 NEAR SYNCOPE: ICD-10-CM

## 2023-12-14 DIAGNOSIS — I10 HYPERTENSION, UNSPECIFIED TYPE: ICD-10-CM

## 2023-12-14 DIAGNOSIS — R06.09 DOE (DYSPNEA ON EXERTION): ICD-10-CM

## 2023-12-14 DIAGNOSIS — I10 PRIMARY HYPERTENSION: ICD-10-CM

## 2023-12-14 DIAGNOSIS — I48.0 PAROXYSMAL ATRIAL FIBRILLATION (H): ICD-10-CM

## 2023-12-14 DIAGNOSIS — R06.09 DOE (DYSPNEA ON EXERTION): Primary | ICD-10-CM

## 2023-12-14 RX ORDER — HYDROCHLOROTHIAZIDE 12.5 MG/1
CAPSULE ORAL
Qty: 90 CAPSULE | Refills: 3 | Status: SHIPPED | OUTPATIENT
Start: 2023-12-14 | End: 2023-12-21

## 2023-12-21 ENCOUNTER — OFFICE VISIT (OUTPATIENT)
Dept: CARDIOLOGY | Facility: CLINIC | Age: 83
End: 2023-12-21
Payer: COMMERCIAL

## 2023-12-21 VITALS
SYSTOLIC BLOOD PRESSURE: 122 MMHG | DIASTOLIC BLOOD PRESSURE: 78 MMHG | OXYGEN SATURATION: 97 % | WEIGHT: 130.1 LBS | BODY MASS INDEX: 25.41 KG/M2 | RESPIRATION RATE: 20 BRPM | HEART RATE: 73 BPM

## 2023-12-21 DIAGNOSIS — R06.09 DOE (DYSPNEA ON EXERTION): ICD-10-CM

## 2023-12-21 DIAGNOSIS — I48.0 PAROXYSMAL ATRIAL FIBRILLATION (H): ICD-10-CM

## 2023-12-21 DIAGNOSIS — I10 PRIMARY HYPERTENSION: ICD-10-CM

## 2023-12-21 DIAGNOSIS — I10 HYPERTENSION, UNSPECIFIED TYPE: ICD-10-CM

## 2023-12-21 DIAGNOSIS — R55 NEAR SYNCOPE: ICD-10-CM

## 2023-12-21 DIAGNOSIS — E87.1 HYPONATREMIA: Primary | ICD-10-CM

## 2023-12-21 PROCEDURE — 99214 OFFICE O/P EST MOD 30 MIN: CPT | Performed by: INTERNAL MEDICINE

## 2023-12-21 RX ORDER — LANOLIN ALCOHOL/MO/W.PET/CERES
1 CREAM (GRAM) TOPICAL
COMMUNITY
Start: 2023-11-08 | End: 2023-12-21

## 2023-12-21 NOTE — PROGRESS NOTES
Cardiology Clinic Office Note    Assessment / Plan:    1.  Paroxysmal atrial fibrillation.  No evidence of recurrence.  Tolerating Eliquis.  On high-dose metoprolol, which could be contributing to her fatigue  2.  Poorly restorative sleep, with daytime sleepiness.  May be due to metoprolol, but could also be related to obstructive sleep apnea.  Have advised formal sleep evaluation.  3.  Hypertension with good control today.  Will discontinue HCTZ, as this could be contributing to hyponatremia.  Recheck basic metabolic profile in 1 week    Follow-up 1 year    ______________________________________________________________________    Subjective:    I had the opportunity to see Adilia Eric at the [facility]. Adilia Eric is a 82 year old female with a history of hypertension, dyspnea on exertion with normal  stress test 5/2022, seen 1/2023 for new onset paroxysmal atrial fibrillation.  She was initiated on Eliquis and increased metoprolol.  Sleep study was recommended, not followed through on.  She returns today for routine follow-up, accompanied by her daughter.    Since I last saw her in April, she had a fall without associated syncope.  She has had repeated blood work showing low sodium.  She is continued hydrochlorothiazide as her blood pressure has been well-controlled.  She continues to feel fatigued, with daily headaches, daily napping, and poorly restorative sleep.  He has begun a regular physical therapy program which seems to be making her feel stronger, she does some strengthening exercises every day.    Does experience occasional jaw aching and left arm aching at rest, this does not occur with activities.  It has not changed recently.    ______________________________________________________________________    Problem List:  Patient Active Problem List   Diagnosis    Stroke (H)    Anxiety disorder    Gout    Hypertension    Hyperlipidemia    Paresthesia     Generalized anxiety disorder    Panic disorder without agoraphobia    Adjustment disorder with mixed anxiety and depressed mood    Mood disorder in conditions classified elsewhere    Near syncope    Vaginal discharge    Paroxysmal atrial fibrillation (H)     Medical History:  Past Medical History:   Diagnosis Date    Depression     Diabetes mellitus (H)     GERD (gastroesophageal reflux disease)     Hyperlipemia     Hypertension     Intracranial hemorrhage (H) 2011    may be amyloid angiopathy; has been stable.    Mastoiditis 2011    Osteoarthritis     TMJ (temporomandibular joint disorder)      Surgical History:  Past Surgical History:   Procedure Laterality Date    CYST REMOVAL      tongue    HYSTERECTOMY  1996    OOPHORECTOMY Bilateral 1996     Social History:  Social History     Socioeconomic History    Marital status:      Spouse name: Not on file    Number of children: Not on file    Years of education: Not on file    Highest education level: Not on file   Occupational History    Not on file   Tobacco Use    Smoking status: Former    Smokeless tobacco: Never   Substance and Sexual Activity    Alcohol use: Yes     Alcohol/week: 1.7 standard drinks of alcohol    Drug use: No    Sexual activity: Not on file   Other Topics Concern    Not on file   Social History Narrative    Not on file     Social Determinants of Health     Financial Resource Strain: Not on file   Food Insecurity: Not on file   Transportation Needs: Not on file   Physical Activity: Not on file   Stress: Not on file   Social Connections: Not on file   Interpersonal Safety: Not on file   Housing Stability: Not on file     Sleep History:  Poorly restorative, naps daily, headaches every morning  Exercise History:  Does physical therapy for leg strengthening exercises with improved stamina.    Review of Systems:      Positive for visual disturbances hearing loss, wheezing, arm pain, shortness of breath with activity, diarrhea, falls, nocturia,  poor wound healing, loss of balance, anxiety, easy bruising.  Point review of systems otherwise negative     Please refer above for cardiac ROS details.         Family History:  Family History   Problem Relation Age of Onset    Heart Disease Father     No Known Problems Mother     No Known Problems Sister     No Known Problems Daughter     No Known Problems Maternal Grandmother     No Known Problems Maternal Grandfather     No Known Problems Paternal Grandmother     No Known Problems Paternal Grandfather     No Known Problems Maternal Aunt     No Known Problems Paternal Aunt     Hereditary Breast and Ovarian Cancer Syndrome No family hx of     Breast Cancer No family hx of     Cancer No family hx of     Colon Cancer No family hx of     Endometrial Cancer No family hx of     Ovarian Cancer No family hx of          Allergies:  Allergies   Allergen Reactions    Seasonal Allergies      Medications:  Current Outpatient Medications   Medication Sig Dispense Refill    allopurinol (ZYLOPRIM) 100 MG tablet Take 100 mg by mouth daily      amLODIPine (NORVASC) 2.5 MG tablet TAKE 1 TABLET BY MOUTH EVERY DAY 90 tablet 2    atorvastatin (LIPITOR) 40 MG tablet [ATORVASTATIN (LIPITOR) 40 MG TABLET] Take 40 mg by mouth bedtime.      baclofen (LIORESAL) 10 MG tablet [BACLOFEN (LIORESAL) 10 MG TABLET] Take 10 mg by mouth at bedtime.       calcium carbonate (OS-MARCELO) 600 mg calcium (1,500 mg) tablet [CALCIUM CARBONATE (OS-MARCELO) 600 MG CALCIUM (1,500 MG) TABLET] Take 1 tablet by mouth daily.       celecoxib (CELEBREX) 200 MG capsule [CELECOXIB (CELEBREX) 200 MG CAPSULE] Take 200 mg by mouth daily.      CONTOUR NEXT TEST test strip 2 times daily      cyanocobalamin 1000 MCG tablet [CYANOCOBALAMIN 1000 MCG TABLET] Take 1,000 mcg by mouth daily.      diphenhydrAMINE (BENADRYL) 25 mg tablet [DIPHENHYDRAMINE (BENADRYL) 25 MG TABLET] Take 25 mg by mouth at bedtime.      ELIQUIS ANTICOAGULANT 5 MG tablet TAKE 1 TABLET BY MOUTH TWICE DAILY TO  THIN BLOOD      hydrochlorothiazide (MICROZIDE) 12.5 MG capsule TAKE 1 CAPSULE BY MOUTH EVERY MORNING. 90 capsule 3    hydrOXYzine (ATARAX) 25 MG tablet TAKE 1 TABLET BY MOUTH UP TO 3 TIMES DAILY AS NEEDED FOR ANXIETY      lactobacillus combo no.11 15 billion cell CpSP Take 1 capsule by mouth daily      lisinopril (ZESTRIL) 20 MG tablet TAKE 1 TABLET BY MOUTH TWICE A DAY FOR BLOOD PRESSURE      LORazepam (ATIVAN) 0.5 MG tablet [LORAZEPAM (ATIVAN) 0.5 MG TABLET] Take 0.5 mg by mouth 2 (two) times a day as needed.       magnesium oxide (MAG-OX) 400 mg tablet [MAGNESIUM OXIDE (MAG-OX) 400 MG TABLET] Take 400 mg by mouth daily.      metFORMIN (GLUCOPHAGE-XR) 500 MG 24 hr tablet [METFORMIN (GLUCOPHAGE-XR) 500 MG 24 HR TABLET] TAKE 1 TABLET BY MOUTH EVERY DAY FOR DIABETES  1    metoprolol succinate ER (TOPROL XL) 50 MG 24 hr tablet Take 1 tablet (50 mg) by mouth 2 times daily 180 tablet 11    Microlet Lancets MISC TEST BLOOD SUGAR 2 X'S DAILY DX: E11.9      acetaminophen (TYLENOL) 500 MG tablet [ACETAMINOPHEN (TYLENOL) 500 MG TABLET] Take 1,000 mg by mouth 2 (two) times a day as needed.  (Patient not taking: Reported on 12/21/2023)      diclofenac sodium (VOLTAREN) 1 % Gel Apply topically 4 times daily as needed (Patient not taking: Reported on 4/21/2023)      nitroFURantoin macrocrystal-monohydrate (MACROBID) 100 MG capsule Take 1 capsule (100 mg) by mouth 2 times daily (Patient not taking: Reported on 12/21/2023) 14 capsule 0       Objective:   Wt Readings from Last 3 Encounters:   12/21/23 59 kg (130 lb 1.6 oz)   04/21/23 59 kg (130 lb)   01/27/23 61.2 kg (135 lb)     Vital signs:  /78 (BP Location: Left arm, Patient Position: Sitting, Cuff Size: Adult Regular)   Pulse 73   Resp 20   Wt 59 kg (130 lb 1.6 oz)   SpO2 97%   BMI 25.41 kg/m        Physical Exam:    General Appearance : Awake, Alert, No acute distress  HEENT: No Scleral icterus; the mucous membranes were pink and moist.  Conjunctivae bilaterally  injected  Neck:  No cervical bruits, jugular venous distention, or thyromegaly   Chest: The spine was straight. Chest wall symmetric  Lungs: Respirations unlabored; the lungs are clear to auscultation.  No wheezing   Cardiovascular:    Auscultation reveals normal first and second heart sounds with no murmurs, rubs, or gallops.  Carotid, radial, and dorsalis pedal pulses are intact and symmetric.    Abdomen: No organomegaly, masses, bruits, or tenderness. Bowels sounds are present  Extremities:  No clubbing, cyanosis.  No edema  Skin: No rash, bruising  Musculoskeletal: No tenderness.  Neurologic: Alert and oriented ×3. Speech is fluent.    Lab Results:  LIPIDS:  Lab Results   Component Value Date    CHOL 117 05/11/2023    CHOL 115 05/16/2022    CHOL 121 07/31/2020     Lab Results   Component Value Date    HDL 51 05/11/2023    HDL 44 (L) 05/16/2022    HDL 42 (L) 07/31/2020     Lab Results   Component Value Date    LDL 43 05/11/2023    LDL 50 05/16/2022    LDL 57 07/31/2020     Lab Results   Component Value Date    TRIG 113 05/11/2023    TRIG 106 05/16/2022    TRIG 108 07/31/2020       Echocardiogram 2/2023:  Left ventricular size, wall motion and function are normal. The ejection  fraction is 55-60%.  Normal right ventricle size and systolic function.  Both atria are of normal size.  No hemodynamically significant valvular abnormalities on 2D or color flow  imaging.  There is no comparison study available.    Pharmacologic nuclear stress test 5/2022:    Lexiscan stress ECG negative for ischemia.    The nuclear stress test is negative for inducible myocardial ischemia or infarction.    The left ventricular ejection fraction at stress is greater than 70%.    A prior study was conducted on 4/16/2019.  No interval change.      COLBY GARCIA MD [facility]  [phone]    This note created using Dragon voice recognition software.  Sound alike errors may have escaped editing.

## 2023-12-21 NOTE — PATIENT INSTRUCTIONS
Continue physical therapy to help you get stronger.  Stop hydrochlorothiazide.  This could be driving your sodium low  We will check a blood test in 1 week  Many of your symptoms could be caused by sleep apnea.Please call [phone] to schedule Sleep consultation.  Another option is [facility] at [phone].  Look forward to seeing you back in 1 year.

## 2023-12-21 NOTE — LETTER
12/21/2023    Dayne Azar MD  [address] [place] [address]    RE: Adilia MARK Jeaneth       Dear Colleague,     I had the pleasure of seeing Adilia Eric in the [facility].    Cardiology Clinic Office Note    Assessment / Plan:    1.  Paroxysmal atrial fibrillation.  No evidence of recurrence.  Tolerating Eliquis.  On high-dose metoprolol, which could be contributing to her fatigue  2.  Poorly restorative sleep, with daytime sleepiness.  May be due to metoprolol, but could also be related to obstructive sleep apnea.  Have advised formal sleep evaluation.  3.  Hypertension with good control today.  Will discontinue HCTZ, as this could be contributing to hyponatremia.  Recheck basic metabolic profile in 1 week    Follow-up 1 year    ______________________________________________________________________    Subjective:    I had the opportunity to see Adilia Eric at the [facility]. Adilia Eric is a 82 year old female with a history of hypertension, dyspnea on exertion with normal  stress test 5/2022, seen 1/2023 for new onset paroxysmal atrial fibrillation.  She was initiated on Eliquis and increased metoprolol.  Sleep study was recommended, not followed through on.  She returns today for routine follow-up, accompanied by her daughter.    Since I last saw her in April, she had a fall without associated syncope.  She has had repeated blood work showing low sodium.  She is continued hydrochlorothiazide as her blood pressure has been well-controlled.  She continues to feel fatigued, with daily headaches, daily napping, and poorly restorative sleep.  He has begun a regular physical therapy program which seems to be making her feel stronger, she does some strengthening exercises every day.    Does experience occasional jaw aching and left arm aching at rest, this does not occur with activities.  It has not changed  recently.    ______________________________________________________________________    Problem List:  Patient Active Problem List   Diagnosis    Stroke (H)    Anxiety disorder    Gout    Hypertension    Hyperlipidemia    Paresthesia    Generalized anxiety disorder    Panic disorder without agoraphobia    Adjustment disorder with mixed anxiety and depressed mood    Mood disorder in conditions classified elsewhere    Near syncope    Vaginal discharge    Paroxysmal atrial fibrillation (H)     Medical History:  Past Medical History:   Diagnosis Date    Depression     Diabetes mellitus (H)     GERD (gastroesophageal reflux disease)     Hyperlipemia     Hypertension     Intracranial hemorrhage (H) 2011    may be amyloid angiopathy; has been stable.    Mastoiditis 2011    Osteoarthritis     TMJ (temporomandibular joint disorder)      Surgical History:  Past Surgical History:   Procedure Laterality Date    CYST REMOVAL      tongue    HYSTERECTOMY  1996    OOPHORECTOMY Bilateral 1996     Social History:  Social History     Socioeconomic History    Marital status:      Spouse name: Not on file    Number of children: Not on file    Years of education: Not on file    Highest education level: Not on file   Occupational History    Not on file   Tobacco Use    Smoking status: Former    Smokeless tobacco: Never   Substance and Sexual Activity    Alcohol use: Yes     Alcohol/week: 1.7 standard drinks of alcohol    Drug use: No    Sexual activity: Not on file   Other Topics Concern    Not on file   Social History Narrative    Not on file     Social Determinants of Health     Financial Resource Strain: Not on file   Food Insecurity: Not on file   Transportation Needs: Not on file   Physical Activity: Not on file   Stress: Not on file   Social Connections: Not on file   Interpersonal Safety: Not on file   Housing Stability: Not on file     Sleep History:  Poorly restorative, naps daily, headaches every morning  Exercise  History:  Does physical therapy for leg strengthening exercises with improved stamina.    Review of Systems:      Positive for visual disturbances hearing loss, wheezing, arm pain, shortness of breath with activity, diarrhea, falls, nocturia, poor wound healing, loss of balance, anxiety, easy bruising.  Point review of systems otherwise negative     Please refer above for cardiac ROS details.         Family History:  Family History   Problem Relation Age of Onset    Heart Disease Father     No Known Problems Mother     No Known Problems Sister     No Known Problems Daughter     No Known Problems Maternal Grandmother     No Known Problems Maternal Grandfather     No Known Problems Paternal Grandmother     No Known Problems Paternal Grandfather     No Known Problems Maternal Aunt     No Known Problems Paternal Aunt     Hereditary Breast and Ovarian Cancer Syndrome No family hx of     Breast Cancer No family hx of     Cancer No family hx of     Colon Cancer No family hx of     Endometrial Cancer No family hx of     Ovarian Cancer No family hx of          Allergies:  Allergies   Allergen Reactions    Seasonal Allergies      Medications:  Current Outpatient Medications   Medication Sig Dispense Refill    allopurinol (ZYLOPRIM) 100 MG tablet Take 100 mg by mouth daily      amLODIPine (NORVASC) 2.5 MG tablet TAKE 1 TABLET BY MOUTH EVERY DAY 90 tablet 2    atorvastatin (LIPITOR) 40 MG tablet [ATORVASTATIN (LIPITOR) 40 MG TABLET] Take 40 mg by mouth bedtime.      baclofen (LIORESAL) 10 MG tablet [BACLOFEN (LIORESAL) 10 MG TABLET] Take 10 mg by mouth at bedtime.       calcium carbonate (OS-MARCELO) 600 mg calcium (1,500 mg) tablet [CALCIUM CARBONATE (OS-MARCELO) 600 MG CALCIUM (1,500 MG) TABLET] Take 1 tablet by mouth daily.       celecoxib (CELEBREX) 200 MG capsule [CELECOXIB (CELEBREX) 200 MG CAPSULE] Take 200 mg by mouth daily.      CONTOUR NEXT TEST test strip 2 times daily      cyanocobalamin 1000 MCG tablet [CYANOCOBALAMIN 1000  MCG TABLET] Take 1,000 mcg by mouth daily.      diphenhydrAMINE (BENADRYL) 25 mg tablet [DIPHENHYDRAMINE (BENADRYL) 25 MG TABLET] Take 25 mg by mouth at bedtime.      ELIQUIS ANTICOAGULANT 5 MG tablet TAKE 1 TABLET BY MOUTH TWICE DAILY TO THIN BLOOD      hydrochlorothiazide (MICROZIDE) 12.5 MG capsule TAKE 1 CAPSULE BY MOUTH EVERY MORNING. 90 capsule 3    hydrOXYzine (ATARAX) 25 MG tablet TAKE 1 TABLET BY MOUTH UP TO 3 TIMES DAILY AS NEEDED FOR ANXIETY      lactobacillus combo no.11 15 billion cell CpSP Take 1 capsule by mouth daily      lisinopril (ZESTRIL) 20 MG tablet TAKE 1 TABLET BY MOUTH TWICE A DAY FOR BLOOD PRESSURE      LORazepam (ATIVAN) 0.5 MG tablet [LORAZEPAM (ATIVAN) 0.5 MG TABLET] Take 0.5 mg by mouth 2 (two) times a day as needed.       magnesium oxide (MAG-OX) 400 mg tablet [MAGNESIUM OXIDE (MAG-OX) 400 MG TABLET] Take 400 mg by mouth daily.      metFORMIN (GLUCOPHAGE-XR) 500 MG 24 hr tablet [METFORMIN (GLUCOPHAGE-XR) 500 MG 24 HR TABLET] TAKE 1 TABLET BY MOUTH EVERY DAY FOR DIABETES  1    metoprolol succinate ER (TOPROL XL) 50 MG 24 hr tablet Take 1 tablet (50 mg) by mouth 2 times daily 180 tablet 11    Microlet Lancets MISC TEST BLOOD SUGAR 2 X'S DAILY DX: E11.9      acetaminophen (TYLENOL) 500 MG tablet [ACETAMINOPHEN (TYLENOL) 500 MG TABLET] Take 1,000 mg by mouth 2 (two) times a day as needed.  (Patient not taking: Reported on 12/21/2023)      diclofenac sodium (VOLTAREN) 1 % Gel Apply topically 4 times daily as needed (Patient not taking: Reported on 4/21/2023)      nitroFURantoin macrocrystal-monohydrate (MACROBID) 100 MG capsule Take 1 capsule (100 mg) by mouth 2 times daily (Patient not taking: Reported on 12/21/2023) 14 capsule 0       Objective:   Wt Readings from Last 3 Encounters:   12/21/23 59 kg (130 lb 1.6 oz)   04/21/23 59 kg (130 lb)   01/27/23 61.2 kg (135 lb)     Vital signs:  /78 (BP Location: Left arm, Patient Position: Sitting, Cuff Size: Adult Regular)   Pulse 73    Resp 20   Wt 59 kg (130 lb 1.6 oz)   SpO2 97%   BMI 25.41 kg/m        Physical Exam:    General Appearance : Awake, Alert, No acute distress  HEENT: No Scleral icterus; the mucous membranes were pink and moist.  Conjunctivae bilaterally injected  Neck:  No cervical bruits, jugular venous distention, or thyromegaly   Chest: The spine was straight. Chest wall symmetric  Lungs: Respirations unlabored; the lungs are clear to auscultation.  No wheezing   Cardiovascular:    Auscultation reveals normal first and second heart sounds with no murmurs, rubs, or gallops.  Carotid, radial, and dorsalis pedal pulses are intact and symmetric.    Abdomen: No organomegaly, masses, bruits, or tenderness. Bowels sounds are present  Extremities:  No clubbing, cyanosis.  No edema  Skin: No rash, bruising  Musculoskeletal: No tenderness.  Neurologic: Alert and oriented ×3. Speech is fluent.    Lab Results:  LIPIDS:  Lab Results   Component Value Date    CHOL 117 05/11/2023    CHOL 115 05/16/2022    CHOL 121 07/31/2020     Lab Results   Component Value Date    HDL 51 05/11/2023    HDL 44 (L) 05/16/2022    HDL 42 (L) 07/31/2020     Lab Results   Component Value Date    LDL 43 05/11/2023    LDL 50 05/16/2022    LDL 57 07/31/2020     Lab Results   Component Value Date    TRIG 113 05/11/2023    TRIG 106 05/16/2022    TRIG 108 07/31/2020       Echocardiogram 2/2023:  Left ventricular size, wall motion and function are normal. The ejection  fraction is 55-60%.  Normal right ventricle size and systolic function.  Both atria are of normal size.  No hemodynamically significant valvular abnormalities on 2D or color flow  imaging.  There is no comparison study available.    Pharmacologic nuclear stress test 5/2022:    Lexiscan stress ECG negative for ischemia.    The nuclear stress test is negative for inducible myocardial ischemia or infarction.    The left ventricular ejection fraction at stress is greater than 70%.    A prior study was  conducted on 4/16/2019.  No interval change.      COLBY GARCIA MD [facility]  [phone]    This note created using Dragon voice recognition software.  Sound alike errors may have escaped editing.             Thank you for allowing me to participate in the care of your patient.      Sincerely,     Colby Garcia MD     [facility]  cc:   Colby Garcia MD  [address] [place] [address]

## 2024-01-31 ENCOUNTER — LAB (OUTPATIENT)
Dept: LAB | Facility: HOSPITAL | Age: 84
End: 2024-01-31
Payer: COMMERCIAL

## 2024-01-31 DIAGNOSIS — R06.09 DOE (DYSPNEA ON EXERTION): ICD-10-CM

## 2024-01-31 DIAGNOSIS — E87.1 HYPONATREMIA: ICD-10-CM

## 2024-01-31 DIAGNOSIS — I10 PRIMARY HYPERTENSION: ICD-10-CM

## 2024-01-31 DIAGNOSIS — R55 NEAR SYNCOPE: ICD-10-CM

## 2024-01-31 DIAGNOSIS — I48.0 PAROXYSMAL ATRIAL FIBRILLATION (H): ICD-10-CM

## 2024-01-31 DIAGNOSIS — I10 HYPERTENSION, UNSPECIFIED TYPE: ICD-10-CM

## 2024-01-31 LAB
ANION GAP SERPL CALCULATED.3IONS-SCNC: 11 MMOL/L (ref 7–15)
BUN SERPL-MCNC: 11.2 MG/DL (ref 8–23)
CALCIUM SERPL-MCNC: 9.6 MG/DL (ref 8.8–10.2)
CHLORIDE SERPL-SCNC: 100 MMOL/L (ref 98–107)
CREAT SERPL-MCNC: 0.81 MG/DL (ref 0.51–0.95)
DEPRECATED HCO3 PLAS-SCNC: 25 MMOL/L (ref 22–29)
EGFRCR SERPLBLD CKD-EPI 2021: 72 ML/MIN/1.73M2
GLUCOSE SERPL-MCNC: 149 MG/DL (ref 70–99)
POTASSIUM SERPL-SCNC: 4.7 MMOL/L (ref 3.4–5.3)
SODIUM SERPL-SCNC: 136 MMOL/L (ref 135–145)

## 2024-01-31 PROCEDURE — 82374 ASSAY BLOOD CARBON DIOXIDE: CPT

## 2024-01-31 PROCEDURE — 36415 COLL VENOUS BLD VENIPUNCTURE: CPT

## 2024-02-05 ENCOUNTER — APPOINTMENT (OUTPATIENT)
Dept: RADIOLOGY | Facility: HOSPITAL | Age: 84
End: 2024-02-05
Payer: COMMERCIAL

## 2024-02-05 ENCOUNTER — HOSPITAL ENCOUNTER (EMERGENCY)
Facility: HOSPITAL | Age: 84
Discharge: HOME | End: 2024-02-05
Attending: EMERGENCY MEDICINE | Admitting: EMERGENCY MEDICINE
Payer: COMMERCIAL

## 2024-02-05 VITALS
WEIGHT: 130 LBS | DIASTOLIC BLOOD PRESSURE: 92 MMHG | OXYGEN SATURATION: 93 % | HEIGHT: 60 IN | HEART RATE: 75 BPM | RESPIRATION RATE: 22 BRPM | BODY MASS INDEX: 25.52 KG/M2 | SYSTOLIC BLOOD PRESSURE: 149 MMHG | TEMPERATURE: 97.3 F

## 2024-02-05 DIAGNOSIS — I48.91 ATRIAL FIBRILLATION STATUS POST CARDIOVERSION (H): ICD-10-CM

## 2024-02-05 DIAGNOSIS — I48.0 PAROXYSMAL ATRIAL FIBRILLATION (H): ICD-10-CM

## 2024-02-05 LAB
ANION GAP SERPL CALCULATED.3IONS-SCNC: 10 MMOL/L (ref 7–15)
BASOPHILS # BLD AUTO: 0 10E3/UL (ref 0–0.2)
BASOPHILS NFR BLD AUTO: 0 %
BUN SERPL-MCNC: 9.4 MG/DL (ref 8–23)
CALCIUM SERPL-MCNC: 9.6 MG/DL (ref 8.8–10.2)
CHLORIDE SERPL-SCNC: 99 MMOL/L (ref 98–107)
CREAT SERPL-MCNC: 0.8 MG/DL (ref 0.51–0.95)
DEPRECATED HCO3 PLAS-SCNC: 26 MMOL/L (ref 22–29)
EGFRCR SERPLBLD CKD-EPI 2021: 73 ML/MIN/1.73M2
EOSINOPHIL # BLD AUTO: 0.1 10E3/UL (ref 0–0.7)
EOSINOPHIL NFR BLD AUTO: 2 %
ERYTHROCYTE [DISTWIDTH] IN BLOOD BY AUTOMATED COUNT: 13.2 % (ref 10–15)
GLUCOSE SERPL-MCNC: 206 MG/DL (ref 70–99)
HCT VFR BLD AUTO: 44.6 % (ref 35–47)
HGB BLD-MCNC: 14.4 G/DL (ref 11.7–15.7)
HOLD SPECIMEN: NORMAL
IMM GRANULOCYTES # BLD: 0 10E3/UL
IMM GRANULOCYTES NFR BLD: 0 %
LYMPHOCYTES # BLD AUTO: 1.2 10E3/UL (ref 0.8–5.3)
LYMPHOCYTES NFR BLD AUTO: 15 %
MCH RBC QN AUTO: 31.5 PG (ref 26.5–33)
MCHC RBC AUTO-ENTMCNC: 32.3 G/DL (ref 31.5–36.5)
MCV RBC AUTO: 98 FL (ref 78–100)
MONOCYTES # BLD AUTO: 0.6 10E3/UL (ref 0–1.3)
MONOCYTES NFR BLD AUTO: 7 %
NEUTROPHILS # BLD AUTO: 5.8 10E3/UL (ref 1.6–8.3)
NEUTROPHILS NFR BLD AUTO: 76 %
NRBC # BLD AUTO: 0 10E3/UL
NRBC BLD AUTO-RTO: 0 /100
NT-PROBNP SERPL-MCNC: 3675 PG/ML (ref 0–1800)
PLATELET # BLD AUTO: 271 10E3/UL (ref 150–450)
POTASSIUM SERPL-SCNC: 4.4 MMOL/L (ref 3.4–5.3)
RBC # BLD AUTO: 4.57 10E6/UL (ref 3.8–5.2)
SODIUM SERPL-SCNC: 135 MMOL/L (ref 135–145)
TROPONIN T SERPL HS-MCNC: 7 NG/L
WBC # BLD AUTO: 7.7 10E3/UL (ref 4–11)

## 2024-02-05 PROCEDURE — 250N000009 HC RX 250: Performed by: EMERGENCY MEDICINE

## 2024-02-05 PROCEDURE — 85004 AUTOMATED DIFF WBC COUNT: CPT

## 2024-02-05 PROCEDURE — 999N000157 HC STATISTIC RCP TIME EA 10 MIN

## 2024-02-05 PROCEDURE — 272N000240 HC CARDIOVERT/DEFIB/PACER SUPP

## 2024-02-05 PROCEDURE — 93005 ELECTROCARDIOGRAM TRACING: CPT | Performed by: STUDENT IN AN ORGANIZED HEALTH CARE EDUCATION/TRAINING PROGRAM

## 2024-02-05 PROCEDURE — 71045 X-RAY EXAM CHEST 1 VIEW: CPT

## 2024-02-05 PROCEDURE — 96374 THER/PROPH/DIAG INJ IV PUSH: CPT | Mod: 59

## 2024-02-05 PROCEDURE — 99285 EMERGENCY DEPT VISIT HI MDM: CPT | Mod: 25

## 2024-02-05 PROCEDURE — 84484 ASSAY OF TROPONIN QUANT: CPT

## 2024-02-05 PROCEDURE — 92960 CARDIOVERSION ELECTRIC EXT: CPT

## 2024-02-05 PROCEDURE — 80048 BASIC METABOLIC PNL TOTAL CA: CPT

## 2024-02-05 PROCEDURE — 36415 COLL VENOUS BLD VENIPUNCTURE: CPT | Performed by: EMERGENCY MEDICINE

## 2024-02-05 PROCEDURE — 83880 ASSAY OF NATRIURETIC PEPTIDE: CPT

## 2024-02-05 PROCEDURE — 93005 ELECTROCARDIOGRAM TRACING: CPT | Performed by: EMERGENCY MEDICINE

## 2024-02-05 PROCEDURE — 96375 TX/PRO/DX INJ NEW DRUG ADDON: CPT | Mod: 59

## 2024-02-05 RX ORDER — ETOMIDATE 2 MG/ML
10 INJECTION INTRAVENOUS ONCE
Status: COMPLETED | OUTPATIENT
Start: 2024-02-05 | End: 2024-02-05

## 2024-02-05 RX ORDER — METOPROLOL TARTRATE 1 MG/ML
5 INJECTION, SOLUTION INTRAVENOUS ONCE
Status: COMPLETED | OUTPATIENT
Start: 2024-02-05 | End: 2024-02-05

## 2024-02-05 RX ADMIN — ETOMIDATE 10 MG: 20 INJECTION, SOLUTION INTRAVENOUS at 12:05

## 2024-02-05 RX ADMIN — METOPROLOL TARTRATE 5 MG: 5 INJECTION INTRAVENOUS at 10:41

## 2024-02-05 ASSESSMENT — ACTIVITIES OF DAILY LIVING (ADL)
ADLS_ACUITY_SCORE: 35
ADLS_ACUITY_SCORE: 35

## 2024-02-05 NOTE — ED TRIAGE NOTES
She started to have SOB a week ago. It has gotten worse along the way. Worse with walking. She denies pain at this time.

## 2024-02-05 NOTE — ED NOTES
Emergency Department Midlevel Supervisory Note     I had a face to face encounter with this patient seen by the Advanced Practice Provider (PADMINI). I personally made/approved the management plan and take responsibility for the patient management. I personally saw patient and performed a substantive portion of the visit including all aspects of the medical decision making.     ED Course:  10:20 AM   Dr. Nilson Loja, resident physician,  staffed patient with me. I agree with their assessment and plan of management, and I will see the patient.  10:35 AM I met with the patient to introduce myself, gather additional history, perform my initial exam, and discuss the plan.     Pt overall improved after cardioversion.  Will follow up with cardiology for further cares. Counseled on sedation precautions.  Pt without hypoxia, no chest pain, appropriate for discharge.    Brief HPI:     Adilia Eric is a 83 year old female who presents for evaluation of shortness of breath. Patient reports one week of worsening, exertional, shortness of breath. She does not recall an inciting event a week ago she has a regular runny nose and dry cough but that has not changed. Patient denies chest pain fever chills difficulty eating or difficulty with bowel movements or urination.     I, Azalea Araujo, am serving as a scribe to document services personally performed by Jalen Grey DO, based on my observations and the provider's statements to me.   I, Jalen Grey DO, attest that Azalea Araujo was acting in a scribe capacity, has observed my performance of the services and has documented them in accordance with my direction.    Brief Physical Exam: BP (!) 149/92   Pulse 75   Temp 97.3  F (36.3  C) (Oral)   Resp 22   Ht 1.524 m (5')   Wt 59 kg (130 lb)   SpO2 93%   BMI 25.39 kg/m    Constitutional:  Alert, in no acute distress  EYES: Conjunctivae clear  HENT:  Atraumatic, no JVD  Respiratory:  Respirations even, unlabored, in no  acute respiratory distress  Cardiovascular:  Irregularly irregular, tachy, good peripheral perfusion  GI: Soft, non-distended, non-tender  Musculoskeletal:  Moves all 4 extremities equally, grossly symmetrical strength, no calf tenderness or swelling b/l.  2+ radial and DP pulses b/l  Integument: Warm & dry. No appreciable rash, erythema.  Neurologic:  Alert & oriented, speech clear and fluent, no focal deficits noted  Psych: Normal mood and affect       MDM:  Pt seen in conjunction with resident, Dr. Loja.  Hx of PAF diagnosed 1 year ago, typically out of Afib on eliquis, here with 1 week of ongoing dyspnea.  No other additional acute changes or symptoms.  Pt in Afib here with HRs 110s, typically not in Afib and I suspect this is at least partially the cause of her symptoms.  Agree with labs, CXR.  Will discuss with pt possible cardioversion for symptoms as she's compliant with Eliquis.  Pt does have some diminished breath sounds b/l bases.  Consideration for new CHF, pt not on diuretics currently.  Pt without hypoxia. Suspect symptoms primarily Afib related. Will reassess after labs, CXR.  No chest pain and low suspicion for something like PE.    ED Course as of 02/05/24 1427   Mon Feb 05, 2024   1143 CXR (independent interpretation): no acute cardiopulmonary process    1143 hsTrop negative.  BNP elevated, but CXR without kendall edema. Will discuss with pt possible cardioversion.   1153 Pt would like to proceed with procedural sedation and cardioversion for symptomatic Afib that pt is intolerant of.  Signed written consent and family agreeable as well.   1203 I completed a cardioversion procedure.  Pt tolerated well, converted to NSR, rate 80s.  Pt doing well without complications.     1340 Pt remains in sinus rhythm, feels much better. Will refer to rapid access cardiology.       1. Paroxysmal atrial fibrillation (H)    2. Atrial fibrillation status post cardioversion (H)          Labs and Imaging:  Results for  orders placed or performed during the hospital encounter of 02/05/24   XR Chest Port 1 View    Impression    IMPRESSION: Fine reticular opacities in both lung bases again noted suggestive of fibrosis. No signs of pneumonia or failure. No effusions. Heart is of normal size. Thoracic aorta is ectatic.   Extra Blue Top Tube   Result Value Ref Range    Hold Specimen JIC    Extra Red Top Tube   Result Value Ref Range    Hold Specimen JIC    Extra Green Top (Lithium Heparin) Tube   Result Value Ref Range    Hold Specimen JIC    Extra Purple Top Tube   Result Value Ref Range    Hold Specimen JIC    Extra Heparinized Syringe   Result Value Ref Range    Hold Specimen JIC    Extra Green Top (Lithium Heparin) Tube   Result Value Ref Range    Hold Specimen JIC    Basic metabolic panel   Result Value Ref Range    Sodium 135 135 - 145 mmol/L    Potassium 4.4 3.4 - 5.3 mmol/L    Chloride 99 98 - 107 mmol/L    Carbon Dioxide (CO2) 26 22 - 29 mmol/L    Anion Gap 10 7 - 15 mmol/L    Urea Nitrogen 9.4 8.0 - 23.0 mg/dL    Creatinine 0.80 0.51 - 0.95 mg/dL    GFR Estimate 73 >60 mL/min/1.73m2    Calcium 9.6 8.8 - 10.2 mg/dL    Glucose 206 (H) 70 - 99 mg/dL   Result Value Ref Range    Troponin T, High Sensitivity 7 <=14 ng/L   Nt probnp inpatient (BNP)   Result Value Ref Range    N terminal Pro BNP Inpatient 3,675 (H) 0 - 1,800 pg/mL   CBC with platelets and differential   Result Value Ref Range    WBC Count 7.7 4.0 - 11.0 10e3/uL    RBC Count 4.57 3.80 - 5.20 10e6/uL    Hemoglobin 14.4 11.7 - 15.7 g/dL    Hematocrit 44.6 35.0 - 47.0 %    MCV 98 78 - 100 fL    MCH 31.5 26.5 - 33.0 pg    MCHC 32.3 31.5 - 36.5 g/dL    RDW 13.2 10.0 - 15.0 %    Platelet Count 271 150 - 450 10e3/uL    % Neutrophils 76 %    % Lymphocytes 15 %    % Monocytes 7 %    % Eosinophils 2 %    % Basophils 0 %    % Immature Granulocytes 0 %    NRBCs per 100 WBC 0 <1 /100    Absolute Neutrophils 5.8 1.6 - 8.3 10e3/uL    Absolute Lymphocytes 1.2 0.8 - 5.3 10e3/uL     Absolute Monocytes 0.6 0.0 - 1.3 10e3/uL    Absolute Eosinophils 0.1 0.0 - 0.7 10e3/uL    Absolute Basophils 0.0 0.0 - 0.2 10e3/uL    Absolute Immature Granulocytes 0.0 <=0.4 10e3/uL    Absolute NRBCs 0.0 10e3/uL       I have reviewed the relevant laboratory studies above.    I independently interpreted the following imaging study(s):   CXR (independent interpretation): no acute cardiopulmonary process, chronic fibrotic changes seen    EKG: I reviewed and independently interpreted the patient's EKG, with comments made as listed below. Please see scanned EKG for full report.   EKG 1: Afib, rate 118, no acute ischemia, new Afib compared to most recent EKG from 7/26/23  EKG 2: NSR, rate 81, normal intervals, no acute ischemia    Procedures:  I was present for the key portions of procedures documented in PADMINI/midlevel note, see midlevel note for further details.    PROCEDURE: Electrical Cardioversion with Procedural Sedation   INDICATIONS: Sedation is required to allow for Cardioversion for Atrial Fibrilation    CARDIOVERSION TYPE: Biphasic, External   SEDATION PROVIDER: Dr Jalen Grey   CARDIOVERSION PROVIDER: Dr Jalen Grey   LEVEL OF SEDATION: Deep Sedation    Defined as:  Minimal = Normal response to verbal  Moderate = Responds to verbal and light tactile stimulation  Deep = Responds after repeated painful stimulation   CONSENT: Risks, benefits and alternatives were discussed with and Written consent was obtained from Patient.   PROCEDURE SPECIFIC CHECKLIST COMPLETED: Yes   LAST ORAL INTAKE: Clear Liquids >2 hours   ASA CLASS: 3 - Severe systemic disease, but not incapacitating   MALLAMPATI:  I - Faucial pillars, soft palate, and uvula are visible   TIME OUT: Universal protocol was followed. TIME OUT conducted just prior to starting procedure confirmed patient identity, site/side, procedure, patient position, and availability of correct equipment. Yes    Immediately prior to initiation of sedation, reassessment of  clinical condition was performed which was unchanged.   MEDICATIONS GIVEN: Etomidate, 10 mg, IV    MONITORING: heart rate, cardiac monitor, continuous pulse oximeter, continuous capnometry (end tidal CO2), frequent blood pressure checks, level of consciousness checks, IV access, constant attendance by RN until patient is recovered, and constant attendance by MD until patient is stable   RESPONSE: vital signs stable, airway patent, and O2 saturations remained >92%   POST-SEDATION ASSESSMENT/PROCEDURE NOTE: CARDIOVERSION: Trial 1: Synchronized shock at 125 joules was Successful    SEDATION:  Lowest level oxygen saturation reached was 92%.    Post procedure patient was alert and responds to verbal stimuli    Patient was monitored during recovery and returned to pre-procedure baseline.   TOTAL MD DRUG ADMINISTRATION / MONITORING TIME: 15 minutes.   COMPLICATIONS: Patient tolerated procedure well, without complication     Jalen Grey, [facility] EMERGENCY DEPARTMENT  [address] [place] [id number]  [phone]     Malachi Grey MD  02/05/24 1405

## 2024-02-05 NOTE — ED PROVIDER NOTES
EMERGENCY DEPARTMENT ENCOUNTER      NAME: Adilia Eric  AGE: 83 year old female  YOB: 1940  MRN: [id number]  EVALUATION DATE & TIME: 2/5/2024  9:53 AM    PCP: Dayne Azar    ED PROVIDER: Dylon Loja MD      Chief Complaint   Patient presents with    Shortness of Breath         FINAL IMPRESSION:  No diagnosis found.      ED COURSE & MEDICAL DECISION MAKING:    Pertinent Labs & Imaging studies reviewed. (See chart for details)  83 year old female presents to the Emergency Department for evaluation of 1 week of shortness of breath.    Patient presenting with 1 week of shortness of breath.  Initially only on exertion but has now developed to be while resting or minimal exertion.  It has been worse when she wakes up in the morning or when she wakes up in the [place] to go urinate.  Denies recent infectious symptoms or chest pain.  Does endorse abdominal bloating and some lower extremity edema.    Patient has a pertinent history of recently diagnosed paroxysmal A-fib in January 2023.  She is on metoprolol and a DOAC for that.  In the past year she has had her hydrochlorothiazide discontinued due to hyponatremia.  She states that her abdominal bloating worsened after this her abdominal bloating has been present for months to years.    EKG reveals A-fib with RVR.  This likely explains her shortness of breath for the past week.  However she is also having some findings of heart failure and so chest x-ray and BNP are indicated.  Also checking a troponin.  CBC and BMP.    BMP normal other than a glucose of 206.  Troponin of 7.  Normal CBC.  Does have an elevated BNP of 3600.  Chest x-ray largely unremarkable other than reticular opacities in the bilateral lung bases which has been seen before and are suggestive of fibrosis.  No pneumonia normal heart size.    Patient has A-fib with RVR plus or minus new heart failure.  Workup pending.  Patient is currently hemodynamically stable.  Mentation  intact.  No Need for urgent cardioversion at this time.    Metoprolol 5 mg injection did not improve heart rate initially.  Discussed the possibility of cardioversion with the patient and she is interested.  Risk and benefits reviewed and informed consent obtained.  Will proceed with cardioversion using etomidate for sedation.  Good neck mobility and open oropharynx if potential intubation is needed.    Status post cardioversion successfully back into normal sinus rhythm.  Patient has now remained in sinus rhythm for 90 minutes with heart rates ranging from 70-80. She is safe to discharge at this time with follow up with PCP.        ED Course as of 02/05/24 1346   Mon Feb 05, 2024   1143 CXR (independent interpretation): no acute cardiopulmonary process    1143 hsTrop negative.  BNP elevated, but CXR without kendall edema. Will discuss with pt possible cardioversion.   1153 Pt would like to proceed with procedural sedation and cardioversion for symptomatic Afib that pt is intolerant of.  Signed written consent and family agreeable as well.   1203 I completed a cardioversion procedure.  Pt tolerated well, converted to NSR, rate 80s.  Pt doing well without complications.     1340 Pt remains in sinus rhythm, feels much better. Will refer to rapid access cardiology.       At the conclusion of the encounter I discussed the results of all of the tests and the disposition. The questions were answered. The patient or family acknowledged understanding and was agreeable with the care plan.       MEDICATIONS GIVEN IN THE EMERGENCY:  Medications   metoprolol (LOPRESSOR) injection 5 mg (has no administration in time range)   etomidate (AMIDATE) injection 10 mg (has no administration in time range)   metoprolol (LOPRESSOR) injection 5 mg (5 mg Intravenous $Given 2/5/24 1041)       NEW PRESCRIPTIONS STARTED AT TODAY'S ER VISIT  New Prescriptions    No medications on file           =================================================================    HPI    Patient information was obtained from: Patient       Adilia Eric is a 83 year old female with a pertinent history of P A-fib on metoprolol and DOAC, hypertension, diabetes, intracranial hemorrhage who presents to this ED  for evaluation of shortness of breath for 1 week.    Patient coming in with 1 week of worsening shortness of breath.  Initially patient only had dyspnea on exertion.  But over the past week it has been worsening and now she has dyspnea with minimal exertion and upon awakening in the morning and awakening in the evening when she gets up to urinate.  She does not recall an inciting event a week ago she has a regular runny nose and dry cough but that has not changed.    She is also noted some abdominal bloating which has been present for months to years and worsened with discontinuation of hydrochlorothiazide.    Patient denies chest pain fever chills difficulty eating or difficulty with bowel movements or urination.    Of note patient was diagnosed with paroxysmal A-fib 1 year ago.  She is on metoprolol and a DOAC.  Recently had her hydrochlorothiazide stopped for hyponatremia.          REVIEW OF SYSTEMS   Review of Systems negative other than above    PAST MEDICAL HISTORY:  Past Medical History:   Diagnosis Date    Depression     Diabetes mellitus (H)     GERD (gastroesophageal reflux disease)     Hyperlipemia     Hypertension     Intracranial hemorrhage (H) 2011    may be amyloid angiopathy; has been stable.    Mastoiditis 2011    Osteoarthritis     TMJ (temporomandibular joint disorder)        PAST SURGICAL HISTORY:  Past Surgical History:   Procedure Laterality Date    CYST REMOVAL      tongue    HYSTERECTOMY  1996    OOPHORECTOMY Bilateral 1996           CURRENT MEDICATIONS:    acetaminophen (TYLENOL) 500 MG tablet  allopurinol (ZYLOPRIM) 100 MG tablet  amLODIPine (NORVASC) 2.5 MG tablet  atorvastatin (LIPITOR)  40 MG tablet  baclofen (LIORESAL) 10 MG tablet  calcium carbonate (OS-MARCELO) 600 mg calcium (1,500 mg) tablet  celecoxib (CELEBREX) 200 MG capsule  CONTOUR NEXT TEST test strip  cyanocobalamin 1000 MCG tablet  diphenhydrAMINE (BENADRYL) 25 mg tablet  ELIQUIS ANTICOAGULANT 5 MG tablet  hydrOXYzine (ATARAX) 25 MG tablet  lactobacillus combo no.11 15 billion cell CpSP  lisinopril (ZESTRIL) 20 MG tablet  LORazepam (ATIVAN) 0.5 MG tablet  magnesium oxide (MAG-OX) 400 mg tablet  metFORMIN (GLUCOPHAGE-XR) 500 MG 24 hr tablet  metoprolol succinate ER (TOPROL XL) 50 MG 24 hr tablet  Microlet Lancets MISC        ALLERGIES:  Allergies   Allergen Reactions    Seasonal Allergies        FAMILY HISTORY:  Family History   Problem Relation Age of Onset    Heart Disease Father     No Known Problems Mother     No Known Problems Sister     No Known Problems Daughter     No Known Problems Maternal Grandmother     No Known Problems Maternal Grandfather     No Known Problems Paternal Grandmother     No Known Problems Paternal Grandfather     No Known Problems Maternal Aunt     No Known Problems Paternal Aunt     Hereditary Breast and Ovarian Cancer Syndrome No family hx of     Breast Cancer No family hx of     Cancer No family hx of     Colon Cancer No family hx of     Endometrial Cancer No family hx of     Ovarian Cancer No family hx of        SOCIAL HISTORY:   Social History     Socioeconomic History    Marital status:    Tobacco Use    Smoking status: Former    Smokeless tobacco: Never   Substance and Sexual Activity    Alcohol use: Yes     Alcohol/week: 1.7 standard drinks of alcohol    Drug use: No       VITALS:  BP (!) 147/100   Pulse (!) 130   Temp 98.3  F (36.8  C) (Temporal)   Resp 25   Ht 1.524 m (5')   Wt 59 kg (130 lb)   SpO2 96%   BMI 25.39 kg/m      PHYSICAL EXAM    Constitutional: Well developed, Well nourished, NAD  HENT: Normocephalic, Atraumatic, Bilateral external ears normal, Oropharynx normal, mucous  membranes moist, Nose normal.   Eyes: PER, EOMI, Conjunctiva normal, No discharge.   Respiratory: Normal breath sounds, No respiratory distress, No wheezing, Speaks full sentences easily. No cough.  Does have opening crackles in right lower lung base.  Cardiovascular: Irregularly irregular rate and rhythm.  No murmurs, No rubs, No gallops. Chest wall nontender.   GI: No excessive obesity. Bowel sounds normal, Soft, No tenderness, No masses, No flank tenderness. No rebound or guarding.  Patient is generally bloated but is not tense  Musculoskeletal: 2+ DP pulses.  1+ pretibial pitting edema of the left side, trace edema on right side. No cyanosis, No clubbing. Good range of motion in all major joints. No tenderness to palpation or major deformities noted.   Integument: Warm, Dry, No erythema, No rash. No petechiae.   Neurologic: Alert & oriented x 3, Normal motor function, Normal sensory function, No focal deficits noted. Normal gait.    Psychiatric: Affect normal, Judgment normal, Mood normal. Cooperative.      LAB:  All pertinent labs reviewed and interpreted.  Results for orders placed or performed during the hospital encounter of 02/05/24   XR Chest Port 1 View    Impression    IMPRESSION: Fine reticular opacities in both lung bases again noted suggestive of fibrosis. No signs of pneumonia or failure. No effusions. Heart is of normal size. Thoracic aorta is ectatic.   Extra Blue Top Tube   Result Value Ref Range    Hold Specimen JIC    Extra Red Top Tube   Result Value Ref Range    Hold Specimen JIC    Extra Green Top (Lithium Heparin) Tube   Result Value Ref Range    Hold Specimen JIC    Extra Purple Top Tube   Result Value Ref Range    Hold Specimen JIC    Extra Heparinized Syringe   Result Value Ref Range    Hold Specimen JIC    Extra Green Top (Lithium Heparin) Tube   Result Value Ref Range    Hold Specimen JIC    Basic metabolic panel   Result Value Ref Range    Sodium 135 135 - 145 mmol/L    Potassium 4.4  3.4 - 5.3 mmol/L    Chloride 99 98 - 107 mmol/L    Carbon Dioxide (CO2) 26 22 - 29 mmol/L    Anion Gap 10 7 - 15 mmol/L    Urea Nitrogen 9.4 8.0 - 23.0 mg/dL    Creatinine 0.80 0.51 - 0.95 mg/dL    GFR Estimate 73 >60 mL/min/1.73m2    Calcium 9.6 8.8 - 10.2 mg/dL    Glucose 206 (H) 70 - 99 mg/dL   Result Value Ref Range    Troponin T, High Sensitivity 7 <=14 ng/L   Nt probnp inpatient (BNP)   Result Value Ref Range    N terminal Pro BNP Inpatient 3,675 (H) 0 - 1,800 pg/mL   CBC with platelets and differential   Result Value Ref Range    WBC Count 7.7 4.0 - 11.0 10e3/uL    RBC Count 4.57 3.80 - 5.20 10e6/uL    Hemoglobin 14.4 11.7 - 15.7 g/dL    Hematocrit 44.6 35.0 - 47.0 %    MCV 98 78 - 100 fL    MCH 31.5 26.5 - 33.0 pg    MCHC 32.3 31.5 - 36.5 g/dL    RDW 13.2 10.0 - 15.0 %    Platelet Count 271 150 - 450 10e3/uL    % Neutrophils 76 %    % Lymphocytes 15 %    % Monocytes 7 %    % Eosinophils 2 %    % Basophils 0 %    % Immature Granulocytes 0 %    NRBCs per 100 WBC 0 <1 /100    Absolute Neutrophils 5.8 1.6 - 8.3 10e3/uL    Absolute Lymphocytes 1.2 0.8 - 5.3 10e3/uL    Absolute Monocytes 0.6 0.0 - 1.3 10e3/uL    Absolute Eosinophils 0.1 0.0 - 0.7 10e3/uL    Absolute Basophils 0.0 0.0 - 0.2 10e3/uL    Absolute Immature Granulocytes 0.0 <=0.4 10e3/uL    Absolute NRBCs 0.0 10e3/uL       RADIOLOGY:  Reviewed all pertinent imaging. Please see official radiology report.  XR Chest Port 1 View   Final Result   IMPRESSION: Fine reticular opacities in both lung bases again noted suggestive of fibrosis. No signs of pneumonia or failure. No effusions. Heart is of normal size. Thoracic aorta is ectatic.          EKG:    Performed at: [facility] ED    Impression: Atrial fibrillation with RVR with heart rates in the 120s.  No obvious signs of ischemia.    Rate: 118  Rhythm: Atrial fibrillation with RVR  Axis: Normal  MI Interval: *  QRS Interval: 62  QTc Interval: 364  ST Changes: No obvious ST elevations or  depressions  Comparison: 7-    I have independently reviewed and interpreted the EKG(s) documented above.    PROCEDURES:   None      [employer] [place] [facility] Documentation:   CMS Diagnoses:             Dylon Loja MD  [facility] EMERGENCY DEPARTMENT  [address] [place] [id number]  [phone]       Dylon Loja MD  Resident  02/05/24 1156       Dylon Loja MD  Resident  02/05/24 1156       Dylon Loja MD  Resident  02/05/24 2698

## 2024-02-05 NOTE — DISCHARGE INSTRUCTIONS
Follow up with primary clinic.  I also put in for cardiology to call you for appointment. Continue home medications. Return for worsening breathing difficulty.  Please rest today, no driving. Be careful upon first getting up and moving around as sedation can still have effects for next day or so.

## 2024-02-07 ENCOUNTER — OFFICE VISIT (OUTPATIENT)
Dept: CARDIOLOGY | Facility: CLINIC | Age: 84
End: 2024-02-07
Attending: EMERGENCY MEDICINE
Payer: COMMERCIAL

## 2024-02-07 VITALS
BODY MASS INDEX: 26.23 KG/M2 | HEART RATE: 78 BPM | DIASTOLIC BLOOD PRESSURE: 82 MMHG | WEIGHT: 133.6 LBS | HEIGHT: 60 IN | RESPIRATION RATE: 16 BRPM | SYSTOLIC BLOOD PRESSURE: 140 MMHG

## 2024-02-07 DIAGNOSIS — I50.31 ACUTE HEART FAILURE WITH PRESERVED EJECTION FRACTION (H): ICD-10-CM

## 2024-02-07 DIAGNOSIS — R06.09 DOE (DYSPNEA ON EXERTION): ICD-10-CM

## 2024-02-07 DIAGNOSIS — I48.0 PAROXYSMAL ATRIAL FIBRILLATION (H): Primary | ICD-10-CM

## 2024-02-07 DIAGNOSIS — I10 PRIMARY HYPERTENSION: ICD-10-CM

## 2024-02-07 PROBLEM — E11.9 CONTROLLED TYPE 2 DIABETES MELLITUS WITHOUT COMPLICATION, WITHOUT LONG-TERM CURRENT USE OF INSULIN (H): Status: ACTIVE | Noted: 2017-08-17

## 2024-02-07 PROCEDURE — 99215 OFFICE O/P EST HI 40 MIN: CPT | Performed by: NURSE PRACTITIONER

## 2024-02-07 PROCEDURE — G2211 COMPLEX E/M VISIT ADD ON: HCPCS | Performed by: NURSE PRACTITIONER

## 2024-02-07 PROCEDURE — 99417 PROLNG OP E/M EACH 15 MIN: CPT | Performed by: NURSE PRACTITIONER

## 2024-02-07 RX ORDER — FUROSEMIDE 20 MG
TABLET ORAL
Qty: 5 TABLET | Refills: 0 | Status: ON HOLD | OUTPATIENT
Start: 2024-02-07 | End: 2024-04-11

## 2024-02-07 RX ORDER — SOTALOL HYDROCHLORIDE 80 MG/1
80 TABLET ORAL 2 TIMES DAILY
Qty: 180 TABLET | Refills: 3 | Status: SHIPPED | OUTPATIENT
Start: 2024-02-10

## 2024-02-07 NOTE — PROGRESS NOTES
HEART CARE ELECTROPHYSIOLOGY CONSULTATON NOTE      [facility]  [phone]    Thank you, Dr. Grey, for asking the [facility] Electrophysiology team to see Ms. Adilia Eric to evaluate atrial fibrillation.    Assessment/Recommendations   Assessment/Plan:  1.  Persistent Atrial Fibrillation: Episode of AF-RVR associated with fatigue, shortness of breath, and acute HFpEF.  Onset likely 1+ weeks prior to documentation in the ED.  Symptomatic improvement with maintenance of sinus rhythm.  This was her first known episode since January 2023.    We had a lengthy discussion of the physiology and natural progression of atrial fibrillation and treatment options including rate control versus rhythm control depending upon the presence of symptoms.  We further discussed rhythm control with medications or pulmonary vein isolation ablation.  She was given information to review at home.  -- On Saturday, start sotalol 80 mg twice daily and discontinue metoprolol  -- EKG on Monday for QT interval monitoring    She was reassured that atrial fibrillation is not life-threatening, but carries an increased risk for stroke.  She has a [id number] score of 9 for age >75, female gender, HTN, HF, CAD, DM 2, TIA.  She also has an increased risk for bleed due to gait instability and history of SAH.  She may be a candidate for left atrial appendage closure.  Continue Eliquis 5 mg twice daily for stroke prophylaxis.    2.  Acute heart failure with preserved ejection fraction: Secondary to RVR.  Wheeze has resolved, but she still has some abdominal fullness/bloating.  Caution with diuresis due to history of chronic hyponatremia.  Furosemide 20 mg daily x 3 days.    3.  Hypertension: Controlled with amlodipine and metoprolol.  Reevaluate with switch from metoprolol to sotalol as above.    Follow up in 2 months     History of Present Illness/Subjective    HPI: Adilia Eric is a 83 year old female  who comes in today accompanied by her daughter-in-law for urgent EP consultation of atrial fibrillation.  She has a history of paroxysmal atrial fibrillation, hypertension, mild nonobstructive coronary arterie disease on angiography in 2013, hyponatremia, type 2 diabetes, remote history of possible TIA, intracranial hemorrhage 2011 without recurrence, possible obstructive sleep apnea.    Arrhythmia history  Dx/date: Paroxysmal atrial fibrillation diagnosed January 2023.  She was seen in the ED on 2/5/2024 for progressive shortness of breath, wheezing, and fatigue for a little over a week.  She was noted to be in AF-RVR.  Rates did not slow with metoprolol, so she underwent urgent cardioversion.  Sx: Fatigue, progressive shortness of breath, acute HFpEF  [id number] score: 9 for age >75, female gender, HTN, HF, CAD, DM 2, TIA  Oral anticoagulation: Eliquis 5 mg twice daily  Antiarrhythmic medications, AV caitlyn blocking agents: Metoprolol XL 50 mg daily  Procedures  DCCV: 2/5/2024 (ED)  Ablation: N/A    Adilia states she is feeling much better.  Her energy level and breathing is back to baseline and she no longer has a wheeze.  She continues to have some abdominal bloating/fullness.  She also notes a little bit of ankle swelling by the end of the day that is gone by morning.  She has some underlying dyspnea on exertion despite maintenance of sinus rhythm which is not new.  She denies chest discomfort, palpitations, significant peripheral edema, shortness of breath at rest, paroxysmal nocturnal dyspnea, orthopnea, lightheadedness, dizziness, pre-syncope, or syncope.    Cardiographics (EKG personally reviewed):  EKG done 2/5/2024 shows atrial fibrillation with rapid ventricular response at 118 bpm.  Subsequent EKG shows sinus rhythm at 81 bpm, QRS 66 ms, QT/QTc 348/404 ms  EKG done 1/20/2023 shows atrial fibrillation with rapid ventricular response at 126 bpm    ECHO done 2/17/2023:  Left ventricular size, wall motion  and function are normal. The ejection  fraction is 55-60%.  Normal right ventricle size and systolic function.  Both atria are of normal size.  No hemodynamically significant valvular abnormalities on 2D or color flow  imaging.  There is no comparison study available.    NM stress test done 5/18/2022:    Lexiscan stress ECG negative for ischemia.    The nuclear stress test is negative for inducible myocardial ischemia or infarction.    The left ventricular ejection fraction at stress is greater than 70%.    A prior study was conducted on 4/16/2019.  No interval change.    I have reviewed and updated the patient's Past Medical History, Social History, Family History and Medication List.     Physical Examination  Review of Systems   Vitals: BP (!) 140/82 (BP Location: Left arm, Patient Position: Sitting, Cuff Size: Adult Regular)   Pulse 78   Resp 16   Ht 1.524 m (5')   Wt 60.6 kg (133 lb 9.6 oz)   BMI 26.09 kg/m    BMI= Body mass index is 26.09 kg/m .  Wt Readings from Last 3 Encounters:   02/07/24 60.6 kg (133 lb 9.6 oz)   02/05/24 59 kg (130 lb)   12/21/23 59 kg (130 lb 1.6 oz)       General Appearance:   Alert, well-appearing and in no acute distress.   HEENT: Atraumatic, normocephalic.  No scleral icterus, normal conjunctivae, EOMs intact, PERRL.  Mucous membranes pink and moist.     Chest/Lungs:   Chest symmetric, spine straight.  Respirations unlabored.  Lungs are clear to auscultation.   Cardiovascular:   Regular rate and rhythm.  Normal first and second heart sounds with no murmurs, rubs, or gallops; radial and posterior tibial pulses are intact, No edema.   Abdomen:  Soft, nondistended, bowel sounds present.   Extremities: No cyanosis or clubbing.   Musculoskeletal: Moves all extremities.  Gait unsteady, ambulates with cane.   Skin: Warm, dry, intact.    Neurologic: Mood and affect are appropriate.  Alert and oriented to person, place, time, and situation.     ROS: 10 point ROS neg other than the  symptoms noted above in the HPI.        Medical History  Surgical History Family History Social History   Past Medical History:   Diagnosis Date    Depression     Diabetes mellitus (H)     GERD (gastroesophageal reflux disease)     Hyperlipemia     Hypertension     Intracranial hemorrhage (H) 2011    may be amyloid angiopathy; has been stable.    Mastoiditis 2011    Osteoarthritis     TMJ (temporomandibular joint disorder)      Past Surgical History:   Procedure Laterality Date    CYST REMOVAL      tongue    HYSTERECTOMY  1996    OOPHORECTOMY Bilateral 1996     Family History   Problem Relation Age of Onset    Heart Disease Father     No Known Problems Mother     No Known Problems Sister     No Known Problems Daughter     No Known Problems Maternal Grandmother     No Known Problems Maternal Grandfather     No Known Problems Paternal Grandmother     No Known Problems Paternal Grandfather     No Known Problems Maternal Aunt     No Known Problems Paternal Aunt     Hereditary Breast and Ovarian Cancer Syndrome No family hx of     Breast Cancer No family hx of     Cancer No family hx of     Colon Cancer No family hx of     Endometrial Cancer No family hx of     Ovarian Cancer No family hx of         Social History     Socioeconomic History    Marital status:      Spouse name: Not on file    Number of children: Not on file    Years of education: Not on file    Highest education level: Not on file   Occupational History    Not on file   Tobacco Use    Smoking status: Former    Smokeless tobacco: Never   Substance and Sexual Activity    Alcohol use: Yes     Alcohol/week: 1.7 standard drinks of alcohol    Drug use: No    Sexual activity: Not on file   Other Topics Concern    Not on file   Social History Narrative    Not on file     Social Determinants of Health     Financial Resource Strain: Not on file   Food Insecurity: Not on file   Transportation Needs: Not on file   Physical Activity: Not on file   Stress: Not on  file   Social Connections: Not on file   Interpersonal Safety: Not on file   Housing Stability: Not on file           Medications  Allergies   Current Outpatient Medications   Medication Sig Dispense Refill    acetaminophen (TYLENOL) 500 MG tablet Take 1,000 mg by mouth 2 times daily as needed      allopurinol (ZYLOPRIM) 100 MG tablet Take 100 mg by mouth daily      amLODIPine (NORVASC) 2.5 MG tablet TAKE 1 TABLET BY MOUTH EVERY DAY 90 tablet 2    atorvastatin (LIPITOR) 40 MG tablet [ATORVASTATIN (LIPITOR) 40 MG TABLET] Take 40 mg by mouth bedtime.      baclofen (LIORESAL) 10 MG tablet [BACLOFEN (LIORESAL) 10 MG TABLET] Take 10 mg by mouth at bedtime.       calcium carbonate (OS-MARCELO) 600 mg calcium (1,500 mg) tablet [CALCIUM CARBONATE (OS-MARCELO) 600 MG CALCIUM (1,500 MG) TABLET] Take 1 tablet by mouth daily.       celecoxib (CELEBREX) 200 MG capsule [CELECOXIB (CELEBREX) 200 MG CAPSULE] Take 200 mg by mouth daily.      CONTOUR NEXT TEST test strip 2 times daily      cyanocobalamin 1000 MCG tablet [CYANOCOBALAMIN 1000 MCG TABLET] Take 1,000 mcg by mouth daily.      diphenhydrAMINE (BENADRYL) 25 mg tablet [DIPHENHYDRAMINE (BENADRYL) 25 MG TABLET] Take 25 mg by mouth at bedtime.      ELIQUIS ANTICOAGULANT 5 MG tablet TAKE 1 TABLET BY MOUTH TWICE DAILY TO THIN BLOOD      furosemide (LASIX) 20 MG tablet Take 20 mg daily x 3 days 5 tablet 0    hydrOXYzine (ATARAX) 25 MG tablet TAKE 1 TABLET BY MOUTH UP TO 3 TIMES DAILY AS NEEDED FOR ANXIETY      lactobacillus combo no.11 15 billion cell CpSP Take 1 capsule by mouth daily      lisinopril (ZESTRIL) 20 MG tablet TAKE 1 TABLET BY MOUTH TWICE A DAY FOR BLOOD PRESSURE      LORazepam (ATIVAN) 0.5 MG tablet [LORAZEPAM (ATIVAN) 0.5 MG TABLET] Take 0.5 mg by mouth 2 (two) times a day as needed.       magnesium oxide (MAG-OX) 400 mg tablet [MAGNESIUM OXIDE (MAG-OX) 400 MG TABLET] Take 400 mg by mouth daily.      metFORMIN (GLUCOPHAGE-XR) 500 MG 24 hr tablet [METFORMIN (GLUCOPHAGE-XR)  "500 MG 24 HR TABLET] TAKE 1 TABLET BY MOUTH EVERY DAY FOR DIABETES  1    Microlet Lancets MISC TEST BLOOD SUGAR 2 X'S DAILY DX: E11.9      [START ON 2/10/2024] sotalol (BETAPACE) 80 MG tablet Take 1 tablet (80 mg) by mouth 2 times daily 180 tablet 3       Allergies   Allergen Reactions    Seasonal Allergies           Lab Results    Chemistry/lipid CBC Cardiac Enzymes/BNP/TSH/INR   Recent Labs   Lab Test 05/11/23  1000   CHOL 117   HDL 51   LDL 43   TRIG 113     Recent Labs   Lab Test 05/11/23  1000 05/16/22  0950 07/31/20  0923   LDL 43 50 57     Recent Labs   Lab Test 02/05/24  1006      POTASSIUM 4.4   CHLORIDE 99   CO2 26   *   BUN 9.4   CR 0.80   GFRESTIMATED 73   MARCELO 9.6     Recent Labs   Lab Test 02/05/24  1006 01/31/24  1218 11/13/23  1123   CR 0.80 0.81 0.80     Recent Labs   Lab Test 12/15/21  1209   A1C 5.9*          Recent Labs   Lab Test 02/05/24  1006   WBC 7.7   HGB 14.4   HCT 44.6   MCV 98        Recent Labs   Lab Test 02/05/24  1006 05/11/23  1000 05/16/22  0950   HGB 14.4 14.7 15.3    Recent Labs   Lab Test 05/05/22  1845 04/28/22  1416 01/13/21  0954   TROPONINI <0.01 <0.01 <0.01     Recent Labs   Lab Test 02/05/24  1006 05/05/22  1845 04/28/22  1416 05/26/21  1509   BNP  --  51 53 46   NTBNPI 3,675*  --   --   --      Recent Labs   Lab Test 05/11/23  1000   TSH 0.96     No results for input(s): \"INR\" in the last 45832 hours.   61 minutes were spent on the date of encounter performing chart review, history and exam, documentation, and further activities as noted above.    The longitudinal plan of care for the condition(s) below were addressed during this visit. Due to the added complexity in care, I will continue to support Adiila in the subsequent management of this condition(s) and with the ongoing continuity of care of this condition(s).    Problem List Items Addressed This Visit as of 2/7/2024         Circulatory    Hypertension    Paroxysmal atrial fibrillation (H) - " Primary    Acute heart failure with preserved ejection fraction (H)

## 2024-02-07 NOTE — PATIENT INSTRUCTIONS
[facility]  Cardiac Electrophysiology  [address]  [place] [address]   Office: [phone]  Fax: [phone]       Adilia Eric,    It was a pleasure to see you today at the [facility].     My recommendations after this visit include:    On Saturday, start taking sotalol 80 mg twice daily to keep you in normal sinus rhythm.  Stop taking metoprolol.  EKG on Monday    Take furosemide (Lasix) 20 mg daily x 3 days for fluid retention    Follow up in 2 months    Tavia Maxwell CNP  [facility], Electrophysiology  [phone]  EP nurses [phone]          ATRIAL FIBRILLATION: Patient Information     What is atrial fibrillation?  Atrial fibrillation (AF, A-fib) is a common heart rhythm problem (arrhythmia) occurring within the upper chambers of the heart (the atria).  In normal rhythm, the upper and lower chambers of the heart are electrically driven to contract in a coordinated sequence.  In atrial fibrillation, the atria lose their ability to contract because of rapid and chaotic electrical activity.  The lower chambers of the heart (the ventricles) continue to pump blood throughout the body, though with irregular and often faster rate due to the chaotic activity within the atria.          How do I know if I have atrial fibrillation?   Some people may feel their heart beating faster, harder, or irregularly while in atrial fibrillation.  Others may be lightheaded, fatigued, feel weak or tired or become more short of breath especially with activities.  Some patients have no symptoms at all.  Atrial fibrillation may be found due to an irregular pulse or on an electrocardiogram (ECG). Atrial fibrillation can start and stop on its own, and episodes can last from seconds to several months.       How common is atrial fibrillation?   An estimated 3-6 million people in the [place] have atrial fibrillation.  Atrial fibrillation is a  common heart rhythm problem for older persons, affecting as estimated 12-15% of people over the age of 65 years of age.     What causes atrial fibrillation?   Age is the most important risk factor for atrial fibrillation.  Atrial fibrillation is more common in people with other heart disease, high blood pressure, diabetes, obesity, sleep apnea and in people who regularly consume alcohol.  Surgery, lung disease, or thyroid problems can lead to atrial fibrillation.  Atrial fibrillation has multiple possible causes, and in most cases a single cause cannot be found.  Atrial fibrillation is a progressive condition, usually starting with at an early stage with short and infrequent episodes.  In later stages of disease, more frequent and longer lasting episodes of atrial fibrillation occur, ultimately culminating in episodes which do not spontaneously terminate.  Generally, more enlargement and scarring within the upper chambers of the heart is observed as atrial fibrillation progresses from early to late-stage disease.     How is atrial fibrillation diagnosed and evaluated?    Because of its start-stop nature, atrial fibrillation can be challenging to diagnose.  Atrial fibrillation is most commonly diagnosed via cardiac rhythm recordings - either an ECG or wearable cardiac rhythm monitor.  For patients with pacemakers, defibrillators or implantable loop recorders, atrial fibrillation may be recorded via these devices.  Recently, commercially available devices (eg. Apple Watch, Starriser device, certain FitBit devices, others) can allow patients to take 30 second cardiac rhythm recordings which may document atrial fibrillation.  Once atrial fibrillation is diagnosed, additional tests include blood tests and an echocardiogram.  The echocardiogram uses ultrasound to look at your heart to assess your cardiac function and evaluate for other heart disease.  Additional evaluation may include CT or MRI studies.     Is atrial  fibrillation dangerous?   Atrial fibrillation is not usually a life-threatening arrhythmia.  The most serious consequences of atrial fibrillation including stroke and worsening of overall cardiac function.  While in atrial fibrillation, the upper cardiac chambers do not contract normally, resulting in slower blood flow and increased risk of clot formation.  If this blood clot becomes detached from the heart a stroke can occur.  Unfortunately, stroke can be the first sign of atrial fibrillation for some people.  With a stroke, you may notice abnormal sensation, weakness on one side of the body or face, changes in your vision or speech.  If you have any of these signs, you should contact EMS and be evaluated in an emergency room as soon as possible.       How is atrial fibrillation treated?     Several treatment options exist for suppressing atrial fibrillation - however, it is not an easily curable arrhythmia.  The first goal in managing atrial fibrillation is to minimize stroke risk.  The second goal is to improve symptoms associated with atrial fibrillation.  Finally, in patients with reduced cardiac function, maintaining normal rhythm can help improve cardiac function.       Blood thinners are used to reduce the risk of stroke in patients with high estimated stroke risk related to atrial fibrillation.  For patients at higher risk of bleeding related to blood thinner use, implantable devices may be an option to reduce stroke risk without the need for long term blood thinner use.       Atrial fibrillation can be managed via two strategies: rate control and rhythm control.  In a rate control strategy, continued atrial fibrillation is accepted and medications (eg. beta-blockers or calcium channel blockers) are used to control the lower chamber rate.  In a rhythm control strategy, anti-arrhythmic medications or catheter ablation are used to maintain normal cardiac rhythm and slow disease progression by suppressing  atrial fibrillation.  A procedure called a cardioversion, in which an electric shock is delivered through patches placed on the chest wall while under deep sedation, can be performed to temporarily restore normal cardiac rhythm, though does not address the chance of atrial fibrillation recurrence.  Treatments are more effective for earlier rather than later stage atrial fibrillation.  Lifestyle modifications (maintaining a healthy weight, aerobic exercise, diagnosing and treating sleep apnea, and minimizing alcohol intake) are important elements of atrial fibrillation rhythm control.      What is catheter ablation for atrial fibrillation?  Cardiac catheter ablation is a commonly performed, minimally invasive procedure performed by a cardiac electrophysiologist to treat many different cardiac rhythm abnormalities.  During catheter ablation, long, thin, flexible tubes are advanced into the heart via small sheaths inserted into the femoral veins and thermal energy (either heating or cooling) is applied within the heart to disrupt abnormal electrical activity.  Atrial fibrillation ablation is performed under general anesthesia, with procedures generally taking approximately 2-3 hours.  Patients are typically observed for 3-5 hours after the ablation, and in most cases can be discharged home the same day.  Atrial fibrillation ablation is associated with better outcomes (mortality, cardiovascular hospitalizations, atrial arrhythmia recurrences) compared to antiarrhythmic drug therapy.  However, atrial fibrillation recurrences are not uncommon, and repeat catheter ablation may be offered.  Your electrophysiology team can review atrial fibrillation ablation, anticipated success rates, risks, and recovery expectations with you.     What are anti-arrhythmic medications?  Anti-arrhythmic medications are specialized drugs which alter cardiac electrical functioning to suppress arrhythmias.  There are several anti-arrhythmic  medications available, each with its own success rate and side effects.  Some anti-arrhythmic medications are less effective though safer to use, others are more effective though have serious potential toxicities.  Atrial fibrillation recurrences are common and may require dose adjustment or change in antiarrhythmic therapy.  Your electrophysiology team will carefully consider which medication would be the best and safest for your particular case.       Can I live a normal life?    The goal of atrial fibrillation management is for patients to live normal lives without being limited by symptoms related to atrial fibrillation.     Are any additional educational resources available?  There are a number of excellent atrial fibrillation education resources available to you online.  A few options you may wish to review include:  hrsonline.org/guide-atrial-fibrillation  afibmatters.org  getsmartaboutafib.com  stopaf.com     What comes next?    Consider your management options and let us know how we can help in your decision process.  Please take medications as they have been prescribed.  You should also get any tests that may have been ordered for you.       When to Call Your Doctor or seek emergency care:  Call your doctor or seek emergency care if you have any significant changes with the following:  Weakness  Dizziness  Fainting  Fatigue  Shortness of breath  Chest pain with increased activity  If you are concerned that your heart rate is too fast or too slow  Bleeding that does not stop in 10 minutes  Coughing or throwing up blood  Bloody diarrhea or bleeding hemorrhoids  Dark-colored urine or black stool  Allergic reactions:  Rash  Itching  Swelling  Trouble breathing or swallowing        Please call the [facility] at [phone] if you have concerns about your symptoms, your medicines, or your follow-up appointments.

## 2024-02-07 NOTE — LETTER
2/7/2024    Malcolm Carrington  [address]  [facility] [address]    RE: Adilia Eric       Dear Colleague,     I had the pleasure of seeing Adilia Eric in the [facility].    HEART CARE ELECTROPHYSIOLOGY CONSULTATON NOTE      [facility]  [phone]    Thank you, Dr. Grey, for asking the [facility] Care Electrophysiology team to see Ms. Adilia Eric to evaluate atrial fibrillation.    Assessment/Recommendations   Assessment/Plan:  1.  Persistent Atrial Fibrillation: Episode of AF-RVR associated with fatigue, shortness of breath, and acute HFpEF.  Onset likely 1+ weeks prior to documentation in the ED.  Symptomatic improvement with maintenance of sinus rhythm.  This was her first known episode since January 2023.    We had a lengthy discussion of the physiology and natural progression of atrial fibrillation and treatment options including rate control versus rhythm control depending upon the presence of symptoms.  We further discussed rhythm control with medications or pulmonary vein isolation ablation.  She was given information to review at home.  -- On Saturday, start sotalol 80 mg twice daily and discontinue metoprolol  -- EKG on Monday for QT interval monitoring    She was reassured that atrial fibrillation is not life-threatening, but carries an increased risk for stroke.  She has a [id number] score of 9 for age >75, female gender, HTN, HF, CAD, DM 2, TIA.  She also has an increased risk for bleed due to gait instability and history of SAH.  She may be a candidate for left atrial appendage closure.  Continue Eliquis 5 mg twice daily for stroke prophylaxis.    2.  Acute heart failure with preserved ejection fraction: Secondary to RVR.  Wheeze has resolved, but she still has some abdominal fullness/bloating.  Caution with diuresis due to history of chronic hyponatremia.  Furosemide 20 mg daily x 3 days.    3.  Hypertension: Controlled with amlodipine and  metoprolol.  Reevaluate with switch from metoprolol to sotalol as above.    Follow up in 2 months     History of Present Illness/Subjective    HPI: Adilia Eric is a 83 year old female who comes in today accompanied by her daughter-in-law for urgent EP consultation of atrial fibrillation.  She has a history of paroxysmal atrial fibrillation, hypertension, mild nonobstructive coronary arterie disease on angiography in 2013, hyponatremia, type 2 diabetes, remote history of possible TIA, intracranial hemorrhage 2011 without recurrence, possible obstructive sleep apnea.    Arrhythmia history  Dx/date: Paroxysmal atrial fibrillation diagnosed January 2023.  She was seen in the ED on 2/5/2024 for progressive shortness of breath, wheezing, and fatigue for a little over a week.  She was noted to be in AF-RVR.  Rates did not slow with metoprolol, so she underwent urgent cardioversion.  Sx: Fatigue, progressive shortness of breath, acute HFpEF  [id number] score: 9 for age >75, female gender, HTN, HF, CAD, DM 2, TIA  Oral anticoagulation: Eliquis 5 mg twice daily  Antiarrhythmic medications, AV caitlyn blocking agents: Metoprolol XL 50 mg daily  Procedures  DCCV: 2/5/2024 (ED)  Ablation: N/A    Adilia states she is feeling much better.  Her energy level and breathing is back to baseline and she no longer has a wheeze.  She continues to have some abdominal bloating/fullness.  She also notes a little bit of ankle swelling by the end of the day that is gone by morning.  She has some underlying dyspnea on exertion despite maintenance of sinus rhythm which is not new.  She denies chest discomfort, palpitations, significant peripheral edema, shortness of breath at rest, paroxysmal nocturnal dyspnea, orthopnea, lightheadedness, dizziness, pre-syncope, or syncope.    Cardiographics (EKG personally reviewed):  EKG done 2/5/2024 shows atrial fibrillation with rapid ventricular response at 118 bpm.  Subsequent EKG shows sinus rhythm  at 81 bpm, QRS 66 ms, QT/QTc 348/404 ms  EKG done 1/20/2023 shows atrial fibrillation with rapid ventricular response at 126 bpm    ECHO done 2/17/2023:  Left ventricular size, wall motion and function are normal. The ejection  fraction is 55-60%.  Normal right ventricle size and systolic function.  Both atria are of normal size.  No hemodynamically significant valvular abnormalities on 2D or color flow  imaging.  There is no comparison study available.    NM stress test done 5/18/2022:    Lexiscan stress ECG negative for ischemia.    The nuclear stress test is negative for inducible myocardial ischemia or infarction.    The left ventricular ejection fraction at stress is greater than 70%.    A prior study was conducted on 4/16/2019.  No interval change.    I have reviewed and updated the patient's Past Medical History, Social History, Family History and Medication List.     Physical Examination  Review of Systems   Vitals: BP (!) 140/82 (BP Location: Left arm, Patient Position: Sitting, Cuff Size: Adult Regular)   Pulse 78   Resp 16   Ht 1.524 m (5')   Wt 60.6 kg (133 lb 9.6 oz)   BMI 26.09 kg/m    BMI= Body mass index is 26.09 kg/m .  Wt Readings from Last 3 Encounters:   02/07/24 60.6 kg (133 lb 9.6 oz)   02/05/24 59 kg (130 lb)   12/21/23 59 kg (130 lb 1.6 oz)       General Appearance:   Alert, well-appearing and in no acute distress.   HEENT: Atraumatic, normocephalic.  No scleral icterus, normal conjunctivae, EOMs intact, PERRL.  Mucous membranes pink and moist.     Chest/Lungs:   Chest symmetric, spine straight.  Respirations unlabored.  Lungs are clear to auscultation.   Cardiovascular:   Regular rate and rhythm.  Normal first and second heart sounds with no murmurs, rubs, or gallops; radial and posterior tibial pulses are intact, No edema.   Abdomen:  Soft, nondistended, bowel sounds present.   Extremities: No cyanosis or clubbing.   Musculoskeletal: Moves all extremities.  Gait unsteady, ambulates  with cane.   Skin: Warm, dry, intact.    Neurologic: Mood and affect are appropriate.  Alert and oriented to person, place, time, and situation.     ROS: 10 point ROS neg other than the symptoms noted above in the HPI.        Medical History  Surgical History Family History Social History   Past Medical History:   Diagnosis Date    Depression     Diabetes mellitus (H)     GERD (gastroesophageal reflux disease)     Hyperlipemia     Hypertension     Intracranial hemorrhage (H) 2011    may be amyloid angiopathy; has been stable.    Mastoiditis 2011    Osteoarthritis     TMJ (temporomandibular joint disorder)      Past Surgical History:   Procedure Laterality Date    CYST REMOVAL      tongue    HYSTERECTOMY  1996    OOPHORECTOMY Bilateral 1996     Family History   Problem Relation Age of Onset    Heart Disease Father     No Known Problems Mother     No Known Problems Sister     No Known Problems Daughter     No Known Problems Maternal Grandmother     No Known Problems Maternal Grandfather     No Known Problems Paternal Grandmother     No Known Problems Paternal Grandfather     No Known Problems Maternal Aunt     No Known Problems Paternal Aunt     Hereditary Breast and Ovarian Cancer Syndrome No family hx of     Breast Cancer No family hx of     Cancer No family hx of     Colon Cancer No family hx of     Endometrial Cancer No family hx of     Ovarian Cancer No family hx of         Social History     Socioeconomic History    Marital status:      Spouse name: Not on file    Number of children: Not on file    Years of education: Not on file    Highest education level: Not on file   Occupational History    Not on file   Tobacco Use    Smoking status: Former    Smokeless tobacco: Never   Substance and Sexual Activity    Alcohol use: Yes     Alcohol/week: 1.7 standard drinks of alcohol    Drug use: No    Sexual activity: Not on file   Other Topics Concern    Not on file   Social History Narrative    Not on file      Social Determinants of Health     Financial Resource Strain: Not on file   Food Insecurity: Not on file   Transportation Needs: Not on file   Physical Activity: Not on file   Stress: Not on file   Social Connections: Not on file   Interpersonal Safety: Not on file   Housing Stability: Not on file           Medications  Allergies   Current Outpatient Medications   Medication Sig Dispense Refill    acetaminophen (TYLENOL) 500 MG tablet Take 1,000 mg by mouth 2 times daily as needed      allopurinol (ZYLOPRIM) 100 MG tablet Take 100 mg by mouth daily      amLODIPine (NORVASC) 2.5 MG tablet TAKE 1 TABLET BY MOUTH EVERY DAY 90 tablet 2    atorvastatin (LIPITOR) 40 MG tablet [ATORVASTATIN (LIPITOR) 40 MG TABLET] Take 40 mg by mouth bedtime.      baclofen (LIORESAL) 10 MG tablet [BACLOFEN (LIORESAL) 10 MG TABLET] Take 10 mg by mouth at bedtime.       calcium carbonate (OS-MARCELO) 600 mg calcium (1,500 mg) tablet [CALCIUM CARBONATE (OS-MARCELO) 600 MG CALCIUM (1,500 MG) TABLET] Take 1 tablet by mouth daily.       celecoxib (CELEBREX) 200 MG capsule [CELECOXIB (CELEBREX) 200 MG CAPSULE] Take 200 mg by mouth daily.      CONTOUR NEXT TEST test strip 2 times daily      cyanocobalamin 1000 MCG tablet [CYANOCOBALAMIN 1000 MCG TABLET] Take 1,000 mcg by mouth daily.      diphenhydrAMINE (BENADRYL) 25 mg tablet [DIPHENHYDRAMINE (BENADRYL) 25 MG TABLET] Take 25 mg by mouth at bedtime.      ELIQUIS ANTICOAGULANT 5 MG tablet TAKE 1 TABLET BY MOUTH TWICE DAILY TO THIN BLOOD      furosemide (LASIX) 20 MG tablet Take 20 mg daily x 3 days 5 tablet 0    hydrOXYzine (ATARAX) 25 MG tablet TAKE 1 TABLET BY MOUTH UP TO 3 TIMES DAILY AS NEEDED FOR ANXIETY      lactobacillus combo no.11 15 billion cell CpSP Take 1 capsule by mouth daily      lisinopril (ZESTRIL) 20 MG tablet TAKE 1 TABLET BY MOUTH TWICE A DAY FOR BLOOD PRESSURE      LORazepam (ATIVAN) 0.5 MG tablet [LORAZEPAM (ATIVAN) 0.5 MG TABLET] Take 0.5 mg by mouth 2 (two) times a day as  "needed.       magnesium oxide (MAG-OX) 400 mg tablet [MAGNESIUM OXIDE (MAG-OX) 400 MG TABLET] Take 400 mg by mouth daily.      metFORMIN (GLUCOPHAGE-XR) 500 MG 24 hr tablet [METFORMIN (GLUCOPHAGE-XR) 500 MG 24 HR TABLET] TAKE 1 TABLET BY MOUTH EVERY DAY FOR DIABETES  1    Microlet Lancets MISC TEST BLOOD SUGAR 2 X'S DAILY DX: E11.9      [START ON 2/10/2024] sotalol (BETAPACE) 80 MG tablet Take 1 tablet (80 mg) by mouth 2 times daily 180 tablet 3       Allergies   Allergen Reactions    Seasonal Allergies           Lab Results    Chemistry/lipid CBC Cardiac Enzymes/BNP/TSH/INR   Recent Labs   Lab Test 05/11/23  1000   CHOL 117   HDL 51   LDL 43   TRIG 113     Recent Labs   Lab Test 05/11/23  1000 05/16/22  0950 07/31/20  0923   LDL 43 50 57     Recent Labs   Lab Test 02/05/24  1006      POTASSIUM 4.4   CHLORIDE 99   CO2 26   *   BUN 9.4   CR 0.80   GFRESTIMATED 73   MARCELO 9.6     Recent Labs   Lab Test 02/05/24  1006 01/31/24  1218 11/13/23  1123   CR 0.80 0.81 0.80     Recent Labs   Lab Test 12/15/21  1209   A1C 5.9*          Recent Labs   Lab Test 02/05/24  1006   WBC 7.7   HGB 14.4   HCT 44.6   MCV 98        Recent Labs   Lab Test 02/05/24  1006 05/11/23  1000 05/16/22  0950   HGB 14.4 14.7 15.3    Recent Labs   Lab Test 05/05/22  1845 04/28/22  1416 01/13/21  0954   TROPONINI <0.01 <0.01 <0.01     Recent Labs   Lab Test 02/05/24  1006 05/05/22  1845 04/28/22  1416 05/26/21  1509   BNP  --  51 53 46   NTBNPI 3,675*  --   --   --      Recent Labs   Lab Test 05/11/23  1000   TSH 0.96     No results for input(s): \"INR\" in the last [id number] hours.   61 minutes were spent on the date of encounter performing chart review, history and exam, documentation, and further activities as noted above.    The longitudinal plan of care for the condition(s) below were addressed during this visit. Due to the added complexity in care, I will continue to support Adilia in the subsequent management of this condition(s) " and with the ongoing continuity of care of this condition(s).    Problem List Items Addressed This Visit as of 2/7/2024         Circulatory    Hypertension    Paroxysmal atrial fibrillation (H) - Primary    Acute heart failure with preserved ejection fraction (H)                    Thank you for allowing me to participate in the care of your patient.      Sincerely,     PARVIN [facility]  cc:   Malachi Grey MD  [address] [place] [address]

## 2024-02-08 LAB
ATRIAL RATE - MUSE: 102 BPM
ATRIAL RATE - MUSE: 81 BPM
DIASTOLIC BLOOD PRESSURE - MUSE: 109 MMHG
DIASTOLIC BLOOD PRESSURE - MUSE: 116 MMHG
INTERPRETATION ECG - MUSE: NORMAL
INTERPRETATION ECG - MUSE: NORMAL
P AXIS - MUSE: 57 DEGREES
P AXIS - MUSE: NORMAL DEGREES
PR INTERVAL - MUSE: 150 MS
PR INTERVAL - MUSE: NORMAL MS
QRS DURATION - MUSE: 62 MS
QRS DURATION - MUSE: 66 MS
QT - MUSE: 260 MS
QT - MUSE: 348 MS
QTC - MUSE: 364 MS
QTC - MUSE: 404 MS
R AXIS - MUSE: -5 DEGREES
R AXIS - MUSE: 9 DEGREES
SYSTOLIC BLOOD PRESSURE - MUSE: 162 MMHG
SYSTOLIC BLOOD PRESSURE - MUSE: 167 MMHG
T AXIS - MUSE: -23 DEGREES
T AXIS - MUSE: 36 DEGREES
VENTRICULAR RATE- MUSE: 118 BPM
VENTRICULAR RATE- MUSE: 81 BPM

## 2024-02-12 ENCOUNTER — ALLIED HEALTH/NURSE VISIT (OUTPATIENT)
Dept: CARDIOLOGY | Facility: CLINIC | Age: 84
End: 2024-02-12
Payer: COMMERCIAL

## 2024-02-12 VITALS
SYSTOLIC BLOOD PRESSURE: 154 MMHG | DIASTOLIC BLOOD PRESSURE: 88 MMHG | HEIGHT: 60 IN | HEART RATE: 87 BPM | RESPIRATION RATE: 16 BRPM | WEIGHT: 132 LBS | BODY MASS INDEX: 25.91 KG/M2

## 2024-02-12 DIAGNOSIS — I48.0 PAROXYSMAL ATRIAL FIBRILLATION (H): ICD-10-CM

## 2024-02-12 PROCEDURE — 93000 ELECTROCARDIOGRAM COMPLETE: CPT | Performed by: INTERNAL MEDICINE

## 2024-02-12 PROCEDURE — 99207 PR NO CHARGE NURSE ONLY: CPT

## 2024-02-12 NOTE — PROGRESS NOTES
EKG Visit    Pt here for EKG, QTc check after starting 80mg Sotalol BID on 2/10/24    EKG shows SR with HR of 87 bpm, QT of 370 ms, and QTc of 445 ms  QTc within acceptable limits, pts EKG was compared to previous EKG in EMR, EKG is stable, and there has been minimal change in QTc  Vital signes were reviewed and are stable    Pt reports some mild shortness of breath, she will continue to monitor this, reviewed with pt reasoning for above medication, reviewed and confirmed pt understands current dose, administration, and frequency of medication, most common potential side effects from the medication, s/s to call the clinic with, and when to seek emergency medical care    Pt was instructed to continue current medication as prescribed, results would be sent to ordering provider for review, pt will be contacted with further changes if necessary, and pt verbalized understanding     Results sent to  Tavia Maxwell NP for further review and recommendations if necessary  2/12/2024 1:23 PM  Padmini Fregoso RN

## 2024-02-13 LAB
ATRIAL RATE - MUSE: 87 BPM
DIASTOLIC BLOOD PRESSURE - MUSE: NORMAL MMHG
INTERPRETATION ECG - MUSE: NORMAL
P AXIS - MUSE: 56 DEGREES
PR INTERVAL - MUSE: 152 MS
QRS DURATION - MUSE: 70 MS
QT - MUSE: 370 MS
QTC - MUSE: 445 MS
R AXIS - MUSE: -25 DEGREES
SYSTOLIC BLOOD PRESSURE - MUSE: NORMAL MMHG
T AXIS - MUSE: 10 DEGREES
VENTRICULAR RATE- MUSE: 87 BPM

## 2024-02-14 NOTE — PROGRESS NOTES
Tavia Maxwell, PARVIN CNP  P [facility] Ep Support Pool - [facility]  EKG reviewed, Lilo montes de oca.  Continue sotalol.  Thanks,  Tavia

## 2024-02-25 ENCOUNTER — APPOINTMENT (OUTPATIENT)
Dept: RADIOLOGY | Facility: HOSPITAL | Age: 84
End: 2024-02-25
Attending: EMERGENCY MEDICINE
Payer: COMMERCIAL

## 2024-02-25 ENCOUNTER — HOSPITAL ENCOUNTER (EMERGENCY)
Facility: HOSPITAL | Age: 84
Discharge: HOME | End: 2024-02-26
Attending: EMERGENCY MEDICINE | Admitting: EMERGENCY MEDICINE
Payer: COMMERCIAL

## 2024-02-25 DIAGNOSIS — F41.9 ANXIETY: ICD-10-CM

## 2024-02-25 DIAGNOSIS — R53.83 FATIGUE, UNSPECIFIED TYPE: ICD-10-CM

## 2024-02-25 LAB
ALBUMIN UR-MCNC: NEGATIVE MG/DL
ANION GAP SERPL CALCULATED.3IONS-SCNC: 9 MMOL/L (ref 7–15)
APPEARANCE UR: CLEAR
BASOPHILS # BLD AUTO: 0.1 10E3/UL (ref 0–0.2)
BASOPHILS NFR BLD AUTO: 1 %
BILIRUB UR QL STRIP: NEGATIVE
BUN SERPL-MCNC: 12.5 MG/DL (ref 8–23)
CALCIUM SERPL-MCNC: 9.7 MG/DL (ref 8.8–10.2)
CHLORIDE SERPL-SCNC: 100 MMOL/L (ref 98–107)
COLOR UR AUTO: COLORLESS
CREAT SERPL-MCNC: 0.67 MG/DL (ref 0.51–0.95)
DEPRECATED HCO3 PLAS-SCNC: 26 MMOL/L (ref 22–29)
EGFRCR SERPLBLD CKD-EPI 2021: 86 ML/MIN/1.73M2
EOSINOPHIL # BLD AUTO: 0.3 10E3/UL (ref 0–0.7)
EOSINOPHIL NFR BLD AUTO: 3 %
ERYTHROCYTE [DISTWIDTH] IN BLOOD BY AUTOMATED COUNT: 12.5 % (ref 10–15)
FLUAV RNA SPEC QL NAA+PROBE: NEGATIVE
FLUBV RNA RESP QL NAA+PROBE: NEGATIVE
GLUCOSE SERPL-MCNC: 198 MG/DL (ref 70–99)
GLUCOSE UR STRIP-MCNC: NEGATIVE MG/DL
HCT VFR BLD AUTO: 46.7 % (ref 35–47)
HGB BLD-MCNC: 15.3 G/DL (ref 11.7–15.7)
HGB UR QL STRIP: NEGATIVE
HOLD SPECIMEN: NORMAL
IMM GRANULOCYTES # BLD: 0 10E3/UL
IMM GRANULOCYTES NFR BLD: 0 %
KETONES UR STRIP-MCNC: NEGATIVE MG/DL
LEUKOCYTE ESTERASE UR QL STRIP: ABNORMAL
LYMPHOCYTES # BLD AUTO: 1.9 10E3/UL (ref 0.8–5.3)
LYMPHOCYTES NFR BLD AUTO: 25 %
MCH RBC QN AUTO: 31 PG (ref 26.5–33)
MCHC RBC AUTO-ENTMCNC: 32.8 G/DL (ref 31.5–36.5)
MCV RBC AUTO: 95 FL (ref 78–100)
MONOCYTES # BLD AUTO: 0.7 10E3/UL (ref 0–1.3)
MONOCYTES NFR BLD AUTO: 9 %
NEUTROPHILS # BLD AUTO: 4.7 10E3/UL (ref 1.6–8.3)
NEUTROPHILS NFR BLD AUTO: 62 %
NITRATE UR QL: NEGATIVE
NRBC # BLD AUTO: 0 10E3/UL
NRBC BLD AUTO-RTO: 0 /100
NT-PROBNP SERPL-MCNC: 186 PG/ML (ref 0–1800)
PH UR STRIP: 6.5 [PH] (ref 5–7)
PLATELET # BLD AUTO: 265 10E3/UL (ref 150–450)
POTASSIUM SERPL-SCNC: 3.8 MMOL/L (ref 3.4–5.3)
RBC # BLD AUTO: 4.93 10E6/UL (ref 3.8–5.2)
RBC URINE: <1 /HPF
RSV RNA SPEC NAA+PROBE: NEGATIVE
SARS-COV-2 RNA RESP QL NAA+PROBE: NEGATIVE
SODIUM SERPL-SCNC: 135 MMOL/L (ref 135–145)
SP GR UR STRIP: 1.01 (ref 1–1.03)
SQUAMOUS EPITHELIAL: 1 /HPF
TROPONIN T SERPL HS-MCNC: 7 NG/L
TSH SERPL DL<=0.005 MIU/L-ACNC: 1.18 UIU/ML (ref 0.3–4.2)
UROBILINOGEN UR STRIP-MCNC: <2 MG/DL
WBC # BLD AUTO: 7.6 10E3/UL (ref 4–11)
WBC URINE: 3 /HPF

## 2024-02-25 PROCEDURE — 36415 COLL VENOUS BLD VENIPUNCTURE: CPT | Performed by: EMERGENCY MEDICINE

## 2024-02-25 PROCEDURE — 96360 HYDRATION IV INFUSION INIT: CPT

## 2024-02-25 PROCEDURE — 87637 SARSCOV2&INF A&B&RSV AMP PRB: CPT | Performed by: EMERGENCY MEDICINE

## 2024-02-25 PROCEDURE — 87086 URINE CULTURE/COLONY COUNT: CPT | Performed by: EMERGENCY MEDICINE

## 2024-02-25 PROCEDURE — 81001 URINALYSIS AUTO W/SCOPE: CPT | Performed by: EMERGENCY MEDICINE

## 2024-02-25 PROCEDURE — 71045 X-RAY EXAM CHEST 1 VIEW: CPT

## 2024-02-25 PROCEDURE — 84484 ASSAY OF TROPONIN QUANT: CPT | Performed by: EMERGENCY MEDICINE

## 2024-02-25 PROCEDURE — 80048 BASIC METABOLIC PNL TOTAL CA: CPT | Performed by: EMERGENCY MEDICINE

## 2024-02-25 PROCEDURE — 84443 ASSAY THYROID STIM HORMONE: CPT | Performed by: EMERGENCY MEDICINE

## 2024-02-25 PROCEDURE — 258N000003 HC RX IP 258 OP 636: Performed by: EMERGENCY MEDICINE

## 2024-02-25 PROCEDURE — 99285 EMERGENCY DEPT VISIT HI MDM: CPT | Mod: 25

## 2024-02-25 PROCEDURE — 85025 COMPLETE CBC W/AUTO DIFF WBC: CPT | Performed by: EMERGENCY MEDICINE

## 2024-02-25 PROCEDURE — 83880 ASSAY OF NATRIURETIC PEPTIDE: CPT | Performed by: EMERGENCY MEDICINE

## 2024-02-25 PROCEDURE — 93005 ELECTROCARDIOGRAM TRACING: CPT | Performed by: EMERGENCY MEDICINE

## 2024-02-25 PROCEDURE — 96361 HYDRATE IV INFUSION ADD-ON: CPT

## 2024-02-25 PROCEDURE — 93005 ELECTROCARDIOGRAM TRACING: CPT | Performed by: STUDENT IN AN ORGANIZED HEALTH CARE EDUCATION/TRAINING PROGRAM

## 2024-02-25 RX ADMIN — SODIUM CHLORIDE 500 ML: 9 INJECTION, SOLUTION INTRAVENOUS at 21:55

## 2024-02-25 ASSESSMENT — ACTIVITIES OF DAILY LIVING (ADL)
ADLS_ACUITY_SCORE: 35

## 2024-02-26 VITALS
HEART RATE: 103 BPM | SYSTOLIC BLOOD PRESSURE: 150 MMHG | TEMPERATURE: 98.3 F | RESPIRATION RATE: 23 BRPM | OXYGEN SATURATION: 94 % | DIASTOLIC BLOOD PRESSURE: 92 MMHG

## 2024-02-26 NOTE — ED NOTES
Bed: JNED-02  Expected date: 2/25/24  Expected time: 8:38 PM  Means of arrival: Ambulance  Comments:  [facility]  83F  SOB  tachycardic

## 2024-02-26 NOTE — DISCHARGE INSTRUCTIONS
Follow-up with Dr. Carrington scheduled on Wednesday in 3 days.  If you have recurrent symptoms of anxiety trial lorazepam.  Monitor your pulse if it is irregular or rapid or you have acute shortness of breath of your baseline or develop any new symptoms including chest pain return to the emergency department.

## 2024-02-26 NOTE — ED TRIAGE NOTES
Betsy arrives by [facility] from home. Has been not feeling well for the past day (diarrhea x1 and shortness of breath). Hx of recent AFIB. Reports her medications were changed at her last hospitalization. Patient sinus tach for EMS at 114 and hypertensive for EMS at 205 systolic. Patient endorses anxiety.     Triage Assessment (Adult)       Row Name 02/25/24 2044          Triage Assessment    Airway WDL WDL        Respiratory WDL    Respiratory WDL X;rhythm/pattern     Rhythm/Pattern, Respiratory shortness of breath        Cardiac WDL    Cardiac WDL X;rhythm     Pulse Rate & Regularity tachycardic     Cardiac Rhythm ST        Peripheral/Neurovascular WDL    Peripheral Neurovascular WDL WDL        Cognitive/Neuro/Behavioral WDL    Cognitive/Neuro/Behavioral WDL WDL

## 2024-02-26 NOTE — ED PROVIDER NOTES
EMERGENCY DEPARTMENT NOTE     Name: Adilia Eric    Age/Sex: 83 year old female   MRN: [id number]   Evaluation Date & Time:  2/25/2024  8:43 PM    PCP:    Malcolm Carrington   ED Provider: Soham Benton D.O.       CHIEF COMPLAINT    Shortness of Breath and Tachycardia       DIAGNOSIS & DISPOSITION/MEDICAL DECISION MAKING   No diagnosis found.    Adilia Eric is a 83 year old female with relevant past history of hypertension, hyperlipidemia, paroxysmal atrial fibrillation, peripheral vascular disease, type 2 diabetes, CHF with diastolic failure and anxiety who presents to the emergency department for evaluation of mass and shortness of breath    Differential  diagnosis considered included but not limited to ACS, pneumonia, cardiac arrhythmia, CHF, pulmonary embolism, urinary tract infection, symptomatic  anemia, electrolyte derangement,,DKA hypothyroidism, anxiety    Medical Decision Making  Patient on exam was in no respiratory distress.    Cardiac: Regular rate and rhythm S1-S2 without murmur  Lungs: Clear to auscultation  Diagnostic studies:  EKG: Sinus rhythm without ischemic changes, troponin 7  Chest x-ray: No pulmonary vascular congestion or infiltrate  Influenza, RSV, COVID PCR negative  Hemoglobin 15.3, glucose 168 with normal CO2 and anion gap, , TSH 1.2  Urinalysis without pyuria hematuria or bacteriuria  Patient reports chronic mild exertional dyspnea unchanged.  She felt anxious tonight and was concerned about possible atrial fibrillation recurrence and intake and lorazepam.  On arrival to the emergency department she was feeling improved.  Patient is on Eliquis and low suspicion for pulmonary embolism and further evaluation with CTA not pursued.  Low suspicion for ACS and serial troponins not pursued.  Patient will be discharged.  She will follow-up with her primary care physician this week.  She will use lorazepam as needed for symptoms of anxiety.  Return criteria discussed and if the  patient has rapid irregular heartbeat or progressive shortness of breath not improved with lorazepam or new symptoms including chest pain will return to the emergency department.    Interventions:None  Discharge Vital Signs:BP (!) 150/92   Pulse 103   Temp 98.3  F (36.8  C) (Oral)   Resp 23   SpO2 94%      DISPOSITION: Home    Diagnostic studies:  Imaging:  No orders to display      Lab:  Labs Ordered and Resulted from Time of ED Arrival to Time of ED Departure   INFLUENZA A/B, RSV, & SARS-COV2 PCR - Normal       Result Value    Influenza A PCR Negative      Influenza B PCR Negative      RSV PCR Negative      SARS CoV2 PCR Negative     BASIC METABOLIC PANEL   TROPONIN T, HIGH SENSITIVITY   NT PROBNP INPATIENT   ROUTINE UA WITH MICROSCOPIC REFLEX TO CULTURE   CBC WITH PLATELETS AND DIFFERENTIAL   TSH WITH FREE T4 REFLEX               Triage note reviewed:Betsy arrives by [facility] from home. Has been not feeling well for the past day (diarrhea x1 and shortness of breath). Hx of recent AFIB. Reports her medications were changed at her last hospitalization. Patient sinus tach for EMS at 114 and hypertensive for EMS at 205 systolic. Patient endorses anxiety.     Triage Assessment (Adult)       Row Name 02/25/24 2044          Triage Assessment    Airway WDL WDL        Respiratory WDL    Respiratory WDL X;rhythm/pattern     Rhythm/Pattern, Respiratory shortness of breath        Cardiac WDL    Cardiac WDL X;rhythm     Pulse Rate & Regularity tachycardic     Cardiac Rhythm ST        Peripheral/Neurovascular WDL    Peripheral Neurovascular WDL WDL        Cognitive/Neuro/Behavioral WDL    Cognitive/Neuro/Behavioral WDL WDL                         History:  Supplemental history from: Family Member/Significant Other patient's daughter  External Record(s) reviewed: Patient cardiology visit February 7, 2024    Work Up:  Chart documentation includes differential considered and any EKGs or imaging independently interpreted by  provider, where specified.  In additional to work up documented, I considered the following work up: CTA of the chest but deferred secondary low suspicion for pulmonary embolism or pericarditis, serial troponin but deferred secondary low suspicion for ACS    External consultation:  Discussion of management with another provider: BARBARA    Complicating factors:  Care impacted by chronic illness: Diabetes, Hyperlipidemia, and Hypertension,IRINA  Care affected by social determinants of health: Access to medical care    Disposition considerations: Discharge. I recommended the patient continue their current prescription strength medication(s): As charted. N/A.    At the conclusion of the encounter I discussed the results of all of the tests and the disposition. The questions were answered. The patient or family acknowledged understanding and was agreeable with the care plan.    TOTAL CRITICAL CARE TIME (EXCLUDING PROCEDURES): Not applicable    PROCEDURES:   None    EMERGENCY DEPARTMENT COURSE   9:19 PM I met with the patient to gather history and to perform my initial exam.  We discussed treatment options and the plan for care while in the Emergency Department.    ED INTERVENTIONS     Medications   sodium chloride 0.9% BOLUS 500 mL (500 mLs Intravenous $New Bag 2/25/24 4564)       DISCHARGE MEDICATIONS        Review of your medicines        UNREVIEWED medicines. Ask your doctor about these medicines        Dose / Directions   acetaminophen 500 MG tablet  Commonly known as: TYLENOL      Dose: 1,000 mg  Take 1,000 mg by mouth 2 times daily as needed  Refills: 0     allopurinol 100 MG tablet  Commonly known as: ZYLOPRIM      Dose: 100 mg  Take 100 mg by mouth daily  Refills: 0     amLODIPine 2.5 MG tablet  Commonly known as: NORVASC  Used for: Primary hypertension, Paroxysmal atrial fibrillation (H)      TAKE 1 TABLET BY MOUTH EVERY DAY  Quantity: 90 tablet  Refills: 2     atorvastatin 40 MG tablet  Commonly known as: LIPITOR       Dose: 40 mg  [ATORVASTATIN (LIPITOR) 40 MG TABLET] Take 40 mg by mouth bedtime.  Refills: 0     baclofen 10 MG tablet  Commonly known as: LIORESAL      Dose: 10 mg  [BACLOFEN (LIORESAL) 10 MG TABLET] Take 10 mg by mouth at bedtime.  Refills: 0     calcium 1500 MG Tabs      Dose: 1 tablet  [CALCIUM CARBONATE (OS-MARCELO) 600 MG CALCIUM (1,500 MG) TABLET] Take 1 tablet by mouth daily.  Refills: 0     celecoxib 200 MG capsule  Commonly known as: celeBREX      Dose: 200 mg  [CELECOXIB (CELEBREX) 200 MG CAPSULE] Take 200 mg by mouth daily.  Refills: 0     diphenhydrAMINE 25 MG tablet  Commonly known as: BENADRYL      Dose: 25 mg  [DIPHENHYDRAMINE (BENADRYL) 25 MG TABLET] Take 25 mg by mouth at bedtime.  Refills: 0     ELIQUIS ANTICOAGULANT 5 MG tablet  Generic drug: apixaban ANTICOAGULANT      TAKE 1 TABLET BY MOUTH TWICE DAILY TO THIN BLOOD  Refills: 0     furosemide 20 MG tablet  Commonly known as: LASIX  Used for: Acute heart failure with preserved ejection fraction (H)      Take 20 mg daily x 3 days  Quantity: 5 tablet  Refills: 0     hydrOXYzine HCl 25 MG tablet  Commonly known as: ATARAX      TAKE 1 TABLET BY MOUTH UP TO 3 TIMES DAILY AS NEEDED FOR ANXIETY  Refills: 0     lisinopril 20 MG tablet  Commonly known as: ZESTRIL      TAKE 1 TABLET BY MOUTH TWICE A DAY FOR BLOOD PRESSURE  Refills: 0     LORazepam 0.5 MG tablet  Commonly known as: ATIVAN      Dose: 0.5 mg  [LORAZEPAM (ATIVAN) 0.5 MG TABLET] Take 0.5 mg by mouth 2 (two) times a day as needed.  Refills: 0     magnesium oxide 400 MG tablet  Commonly known as: MAG-OX      Dose: 400 mg  [MAGNESIUM OXIDE (MAG-OX) 400 MG TABLET] Take 400 mg by mouth daily.  Refills: 0     metFORMIN 500 MG 24 hr tablet  Commonly known as: GLUCOPHAGE XR      [METFORMIN (GLUCOPHAGE-XR) 500 MG 24 HR TABLET] TAKE 1 TABLET BY MOUTH EVERY DAY FOR DIABETES  Refills: 1     Probiotic Mature Adult Caps      Dose: 1 capsule  Take 1 capsule by mouth daily  Refills: 0     sotalol 80 MG  tablet  Commonly known as: BETAPACE  Used for: Paroxysmal atrial fibrillation (H)      Dose: 80 mg  Take 1 tablet (80 mg) by mouth 2 times daily  Quantity: 180 tablet  Refills: 3     vitamin B-12 1000 MCG tablet  Commonly known as: CYANOCOBALAMIN      Dose: 1,000 mcg  [CYANOCOBALAMIN 1000 MCG TABLET] Take 1,000 mcg by mouth daily.  Refills: 0            CONTINUE these medicines which have NOT CHANGED        Dose / Directions   Contour Next Test test strip  Generic drug: blood glucose      2 times daily  Refills: 0     Microlet Lancets Misc      TEST BLOOD SUGAR 2 X'S DAILY DX: E11.9  Refills: 0                INFORMATION SOURCE AND LIMITATIONS    History/Exam limitations: None  Patient information was obtained from: Patient and patient's daughter  Use of : N/A    HISTORY OF PRESENT ILLNESS   Adilia Eric is a 83 year old year old female with a relevant past history of HTN, HLD, T2DM and atrial fibrillation, who presents to this ED by EMS for evaluation of shortness of breath.    The patient endorses shortness of breath and palpitations earlier today that she attributes to her anxiety. She took 1 lorazepam with some relief of her shortness of breath. At baseline, patient gets short of breath when walking as well. She endorses diarrhea which she states is normal when she has anxiety but denies sore throat, fever, urinary symptoms, abdominal pain, vomiting, dark stools or any other complaints at this time.     REVIEW OF SYSTEMS:   All other systems reviewed and are negative except as noted above in HPI.    PATIENT HISTORY     Past Medical History:   Diagnosis Date    Depression     Diabetes mellitus (H)     GERD (gastroesophageal reflux disease)     Hyperlipemia     Hypertension     Intracranial hemorrhage (H) 2011    Mastoiditis 2011    Osteoarthritis     TMJ (temporomandibular joint disorder)      Patient Active Problem List   Diagnosis    Stroke (H)    Anxiety disorder    Gout    Hypertension     Hyperlipidemia    Paresthesia    Generalized anxiety disorder    Panic disorder without agoraphobia    Adjustment disorder with mixed anxiety and depressed mood    Mood disorder in conditions classified elsewhere    Near syncope    Vaginal discharge    Paroxysmal atrial fibrillation (H)    Hyponatremia    Controlled type 2 diabetes mellitus without complication, without long-term current use of insulin (H)    Acute heart failure with preserved ejection fraction (H)     Past Surgical History:   Procedure Laterality Date    CYST REMOVAL      tongue    HYSTERECTOMY  1996    OOPHORECTOMY Bilateral 1996       Allergies   Allergen Reactions    Seasonal Allergies        OUTPATIENT MEDICATIONS     New Prescriptions    No medications on file      Vitals:    02/25/24 2058 02/25/24 2118 02/25/24 2128 02/25/24 2138   BP: 127/76 131/81 131/85    Pulse: 109 105 107    Resp:    27   Temp:       TempSrc:       SpO2: 94% 96% 95%        Physical Exam   Constitutional: Oriented to person, place, and time. Appears well-developed and well-nourished.   Cardiovascular: Normal rate, regular rhythm and normal heart sounds.    Pulmonary/Chest: Normal effort  and breath sounds normal.   Abdominal: Soft. Bowel sounds are normal.   Skin: Skin is warm and dry.   Psychiatric: Anxious mood and affect. Behavior is normal. Thought content normal.     DIAGNOSTICS    LABORATORY FINDINGS (REVIEWED AND INTERPRETED):  Labs Ordered and Resulted from Time of ED Arrival to Time of ED Departure   INFLUENZA A/B, RSV, & SARS-COV2 PCR - Normal       Result Value    Influenza A PCR Negative      Influenza B PCR Negative      RSV PCR Negative      SARS CoV2 PCR Negative     BASIC METABOLIC PANEL   TROPONIN T, HIGH SENSITIVITY   NT PROBNP INPATIENT   ROUTINE UA WITH MICROSCOPIC REFLEX TO CULTURE   CBC WITH PLATELETS AND DIFFERENTIAL   TSH WITH FREE T4 REFLEX         IMAGING (REVIEWED AND INTERPRETED):  No orders to display         ECG (REVIEWED AND INTERPRETED):    ECG:   Performed at: 2/25/24 at 20:47:42  HR:  108 bpm  Rhythm: Sinus tachycardia  Axis: -8  QRS duration: 64 ms  QTC: 431 ms  ST changes: No ST segment elevation or depression, no T wave inversion,No Q wave  Interpretation: Sinus tachycardia  Compared to most recent ECG from: When compared to most previous ECG from 2/12/24, no acute changes.    I have reviewed the patient's ECG, with comments made as listed above. Please see scanned image for full interpretation.         I, Alma Lucas, am serving as a scribe to document services personally performed by Soham Benton D.O., based on my observation and the provider s statements to me.    I, Soham Benton D.O., attest that Alma Lucas is acting in a scribe capacity, has observed my performance of the services and has documented them in accordance with my direction.    Soham Benton D.O.  EMERGENCY MEDICINE   02/25/24  [facility] EMERGENCY DEPARTMENT  [address] [place] [id number]  [phone]  Dept: [phone]     Soham Benton DO  02/26/24 0418

## 2024-02-26 NOTE — ED NOTES
Discharge    Discharge paperwork discussed. Patient questions answered. AVS in hand. Patient discharged from ED to home with daughter and plan to follow-up with PCP on Wednesday.

## 2024-02-27 LAB
BACTERIA UR CULT: ABNORMAL

## 2024-03-01 LAB
ATRIAL RATE - MUSE: 108 BPM
DIASTOLIC BLOOD PRESSURE - MUSE: NORMAL MMHG
INTERPRETATION ECG - MUSE: NORMAL
P AXIS - MUSE: 57 DEGREES
PR INTERVAL - MUSE: 172 MS
QRS DURATION - MUSE: 64 MS
QT - MUSE: 322 MS
QTC - MUSE: 431 MS
R AXIS - MUSE: -8 DEGREES
SYSTOLIC BLOOD PRESSURE - MUSE: NORMAL MMHG
T AXIS - MUSE: 44 DEGREES
VENTRICULAR RATE- MUSE: 108 BPM

## 2024-03-03 ENCOUNTER — HEALTH MAINTENANCE LETTER (OUTPATIENT)
Age: 84
End: 2024-03-03

## 2024-03-22 ENCOUNTER — APPOINTMENT (OUTPATIENT)
Dept: CT IMAGING | Facility: HOSPITAL | Age: 84
End: 2024-03-22
Attending: EMERGENCY MEDICINE
Payer: COMMERCIAL

## 2024-03-22 ENCOUNTER — HOSPITAL ENCOUNTER (EMERGENCY)
Facility: HOSPITAL | Age: 84
Discharge: HOME | End: 2024-03-22
Attending: EMERGENCY MEDICINE | Admitting: EMERGENCY MEDICINE
Payer: COMMERCIAL

## 2024-03-22 VITALS
WEIGHT: 130 LBS | BODY MASS INDEX: 25.39 KG/M2 | HEART RATE: 98 BPM | RESPIRATION RATE: 20 BRPM | OXYGEN SATURATION: 95 % | SYSTOLIC BLOOD PRESSURE: 160 MMHG | DIASTOLIC BLOOD PRESSURE: 95 MMHG | TEMPERATURE: 97.7 F

## 2024-03-22 DIAGNOSIS — N39.0 URINARY TRACT INFECTION WITH HEMATURIA, SITE UNSPECIFIED: ICD-10-CM

## 2024-03-22 DIAGNOSIS — R31.9 URINARY TRACT INFECTION WITH HEMATURIA, SITE UNSPECIFIED: ICD-10-CM

## 2024-03-22 DIAGNOSIS — R19.7 DIARRHEA, UNSPECIFIED TYPE: ICD-10-CM

## 2024-03-22 DIAGNOSIS — R53.1 GENERALIZED WEAKNESS: ICD-10-CM

## 2024-03-22 PROBLEM — K21.9 GERD (GASTROESOPHAGEAL REFLUX DISEASE): Status: ACTIVE | Noted: 2017-08-17

## 2024-03-22 PROBLEM — E78.5 DYSLIPIDEMIA: Status: ACTIVE | Noted: 2017-08-17

## 2024-03-22 PROBLEM — M26.623 BILATERAL TEMPOROMANDIBULAR JOINT PAIN: Status: ACTIVE | Noted: 2017-08-17

## 2024-03-22 PROBLEM — E53.8 B12 DEFICIENCY: Status: ACTIVE | Noted: 2017-08-17

## 2024-03-22 PROBLEM — M16.11 PRIMARY OSTEOARTHRITIS OF RIGHT HIP: Status: ACTIVE | Noted: 2017-08-17

## 2024-03-22 PROBLEM — F33.9 DEPRESSION, RECURRENT (H): Status: ACTIVE | Noted: 2023-12-30

## 2024-03-22 PROBLEM — E79.0 HYPERURICEMIA: Status: ACTIVE | Noted: 2017-08-17

## 2024-03-22 PROBLEM — I10 HTN (HYPERTENSION): Status: ACTIVE | Noted: 2017-08-17

## 2024-03-22 LAB
ALBUMIN SERPL BCG-MCNC: 4.3 G/DL (ref 3.5–5.2)
ALBUMIN UR-MCNC: NEGATIVE MG/DL
ALP SERPL-CCNC: 134 U/L (ref 40–150)
ALT SERPL W P-5'-P-CCNC: 21 U/L (ref 0–50)
ANION GAP SERPL CALCULATED.3IONS-SCNC: 12 MMOL/L (ref 7–15)
APPEARANCE UR: CLEAR
AST SERPL W P-5'-P-CCNC: 29 U/L (ref 0–45)
BILIRUB SERPL-MCNC: 1.7 MG/DL
BILIRUB UR QL STRIP: NEGATIVE
BUN SERPL-MCNC: 7.7 MG/DL (ref 8–23)
CALCIUM SERPL-MCNC: 10.1 MG/DL (ref 8.8–10.2)
CHLORIDE SERPL-SCNC: 96 MMOL/L (ref 98–107)
COLOR UR AUTO: COLORLESS
CREAT SERPL-MCNC: 0.62 MG/DL (ref 0.51–0.95)
CRP SERPL-MCNC: <3 MG/L
DEPRECATED HCO3 PLAS-SCNC: 23 MMOL/L (ref 22–29)
EGFRCR SERPLBLD CKD-EPI 2021: 88 ML/MIN/1.73M2
ERYTHROCYTE [DISTWIDTH] IN BLOOD BY AUTOMATED COUNT: 12.4 % (ref 10–15)
FLUAV RNA SPEC QL NAA+PROBE: NEGATIVE
FLUBV RNA RESP QL NAA+PROBE: NEGATIVE
GLUCOSE SERPL-MCNC: 148 MG/DL (ref 70–99)
GLUCOSE UR STRIP-MCNC: NEGATIVE MG/DL
HCT VFR BLD AUTO: 46.7 % (ref 35–47)
HGB BLD-MCNC: 15.5 G/DL (ref 11.7–15.7)
HGB UR QL STRIP: NEGATIVE
HOLD SPECIMEN: NORMAL
KETONES UR STRIP-MCNC: NEGATIVE MG/DL
LACTATE SERPL-SCNC: 1.9 MMOL/L (ref 0.7–2)
LEUKOCYTE ESTERASE UR QL STRIP: ABNORMAL
MAGNESIUM SERPL-MCNC: 1.9 MG/DL (ref 1.7–2.3)
MCH RBC QN AUTO: 31.4 PG (ref 26.5–33)
MCHC RBC AUTO-ENTMCNC: 33.2 G/DL (ref 31.5–36.5)
MCV RBC AUTO: 95 FL (ref 78–100)
MUCOUS THREADS #/AREA URNS LPF: PRESENT /LPF
NITRATE UR QL: NEGATIVE
PH UR STRIP: 7.5 [PH] (ref 5–7)
PLATELET # BLD AUTO: 263 10E3/UL (ref 150–450)
POTASSIUM SERPL-SCNC: 4 MMOL/L (ref 3.4–5.3)
PROT SERPL-MCNC: 6.9 G/DL (ref 6.4–8.3)
RBC # BLD AUTO: 4.94 10E6/UL (ref 3.8–5.2)
RBC URINE: 1 /HPF
RSV RNA SPEC NAA+PROBE: NEGATIVE
SARS-COV-2 RNA RESP QL NAA+PROBE: NEGATIVE
SODIUM SERPL-SCNC: 131 MMOL/L (ref 135–145)
SP GR UR STRIP: 1.03 (ref 1–1.03)
SQUAMOUS EPITHELIAL: <1 /HPF
UROBILINOGEN UR STRIP-MCNC: <2 MG/DL
WBC # BLD AUTO: 7.6 10E3/UL (ref 4–11)
WBC URINE: 7 /HPF

## 2024-03-22 PROCEDURE — 85027 COMPLETE CBC AUTOMATED: CPT | Performed by: EMERGENCY MEDICINE

## 2024-03-22 PROCEDURE — 36415 COLL VENOUS BLD VENIPUNCTURE: CPT | Performed by: EMERGENCY MEDICINE

## 2024-03-22 PROCEDURE — 87086 URINE CULTURE/COLONY COUNT: CPT | Performed by: EMERGENCY MEDICINE

## 2024-03-22 PROCEDURE — 80053 COMPREHEN METABOLIC PANEL: CPT | Performed by: EMERGENCY MEDICINE

## 2024-03-22 PROCEDURE — 258N000003 HC RX IP 258 OP 636: Performed by: EMERGENCY MEDICINE

## 2024-03-22 PROCEDURE — 87186 SC STD MICRODIL/AGAR DIL: CPT | Performed by: EMERGENCY MEDICINE

## 2024-03-22 PROCEDURE — 86140 C-REACTIVE PROTEIN: CPT | Performed by: EMERGENCY MEDICINE

## 2024-03-22 PROCEDURE — 96361 HYDRATE IV INFUSION ADD-ON: CPT

## 2024-03-22 PROCEDURE — 83735 ASSAY OF MAGNESIUM: CPT | Performed by: EMERGENCY MEDICINE

## 2024-03-22 PROCEDURE — 250N000013 HC RX MED GY IP 250 OP 250 PS 637: Performed by: EMERGENCY MEDICINE

## 2024-03-22 PROCEDURE — 96374 THER/PROPH/DIAG INJ IV PUSH: CPT | Mod: 59

## 2024-03-22 PROCEDURE — 250N000011 HC RX IP 250 OP 636: Performed by: EMERGENCY MEDICINE

## 2024-03-22 PROCEDURE — 87637 SARSCOV2&INF A&B&RSV AMP PRB: CPT | Performed by: EMERGENCY MEDICINE

## 2024-03-22 PROCEDURE — 81001 URINALYSIS AUTO W/SCOPE: CPT | Performed by: EMERGENCY MEDICINE

## 2024-03-22 PROCEDURE — 83605 ASSAY OF LACTIC ACID: CPT | Performed by: EMERGENCY MEDICINE

## 2024-03-22 PROCEDURE — 74177 CT ABD & PELVIS W/CONTRAST: CPT

## 2024-03-22 PROCEDURE — 99285 EMERGENCY DEPT VISIT HI MDM: CPT | Mod: 25

## 2024-03-22 RX ORDER — CEPHALEXIN 500 MG/1
500 CAPSULE ORAL ONCE
Status: COMPLETED | OUTPATIENT
Start: 2024-03-22 | End: 2024-03-22

## 2024-03-22 RX ORDER — IOPAMIDOL 755 MG/ML
64 INJECTION, SOLUTION INTRAVASCULAR ONCE
Status: COMPLETED | OUTPATIENT
Start: 2024-03-22 | End: 2024-03-22

## 2024-03-22 RX ORDER — SODIUM CHLORIDE 9 MG/ML
INJECTION, SOLUTION INTRAVENOUS CONTINUOUS
Status: DISCONTINUED | OUTPATIENT
Start: 2024-03-22 | End: 2024-03-22 | Stop reason: HOSPADM

## 2024-03-22 RX ORDER — CEPHALEXIN 500 MG/1
500 CAPSULE ORAL 3 TIMES DAILY
Qty: 21 CAPSULE | Refills: 0 | Status: SHIPPED | OUTPATIENT
Start: 2024-03-22 | End: 2024-03-29

## 2024-03-22 RX ORDER — LORAZEPAM 2 MG/ML
0.05 INJECTION INTRAMUSCULAR ONCE
Status: COMPLETED | OUTPATIENT
Start: 2024-03-22 | End: 2024-03-22

## 2024-03-22 RX ORDER — IOPAMIDOL 755 MG/ML
90 INJECTION, SOLUTION INTRAVASCULAR ONCE
Status: DISCONTINUED | OUTPATIENT
Start: 2024-03-22 | End: 2024-03-22

## 2024-03-22 RX ADMIN — LORAZEPAM 3 MG: 2 INJECTION INTRAMUSCULAR; INTRAVENOUS at 15:40

## 2024-03-22 RX ADMIN — IOPAMIDOL 64 ML: 755 INJECTION, SOLUTION INTRAVENOUS at 17:24

## 2024-03-22 RX ADMIN — CEPHALEXIN 500 MG: 500 CAPSULE ORAL at 20:39

## 2024-03-22 RX ADMIN — SODIUM CHLORIDE 200 ML: 9 INJECTION, SOLUTION INTRAVENOUS at 15:45

## 2024-03-22 ASSESSMENT — ACTIVITIES OF DAILY LIVING (ADL)
ADLS_ACUITY_SCORE: 35
ADLS_ACUITY_SCORE: 35
ADLS_ACUITY_SCORE: 37
ADLS_ACUITY_SCORE: 35

## 2024-03-22 ASSESSMENT — COLUMBIA-SUICIDE SEVERITY RATING SCALE - C-SSRS
1. IN THE PAST MONTH, HAVE YOU WISHED YOU WERE DEAD OR WISHED YOU COULD GO TO SLEEP AND NOT WAKE UP?: NO
2. HAVE YOU ACTUALLY HAD ANY THOUGHTS OF KILLING YOURSELF IN THE PAST MONTH?: NO
6. HAVE YOU EVER DONE ANYTHING, STARTED TO DO ANYTHING, OR PREPARED TO DO ANYTHING TO END YOUR LIFE?: NO

## 2024-03-22 NOTE — ED NOTES
Pt assisted to the commode x 2  and was able to urinate x 2.  Stool sample got mixed with stool so unable to collect this sample.  Family at bedside

## 2024-03-22 NOTE — ED PROVIDER NOTES
EMERGENCY DEPARTMENT ENCOUNTER      NAME: Adilia Eric  AGE: 83 year old female  YOB: 1940  MRN: [id number]  EVALUATION DATE & TIME: 3/22/2024  2:11 PM    PCP: Malcolm Carrington    ED PROVIDER: Soham Gaming M.D.      Chief Complaint   Patient presents with    Diarrhea    Generalized Weakness         FINAL IMPRESSION:  Diarrhea  Weakness      ED COURSE & MEDICAL DECISION MAKING:    Pertinent Labs & Imaging studies reviewed. (See chart for details)  83 year old female presents to the Emergency Department for evaluation of diarrhea and generalized weakness.  Patient reports having severe diarrhea through much of the morning.  But then reports she has intermittent diarrhea quite frequently.  Denies any new exposures or ingestions.  No exotic travel.  No new medications.  Denies any abdominal pain.  Exam reveals an elderly female mild distress.  Vital signs significant for slight hypertension.  Heart and lungs unremarkable abdomen soft and nontender.  Review of records indicate no recent imaging.  Do have a concern of potential IBS versus colitis.  Given her reports of lightheadedness will obtain baseline blood work to assess for contributory electrolyte imbalance or anemia.  Intravenous fluids initiated.  Will also obtain CT imaging of the abdomen to assess for inflammatory/infectious process. Patient appears non toxic with stable vitals signs. Overall exam is benign.        2:25 PM I met with the patient for the initial interview and physical examination. Discussed plan for treatment and workup in the ED.    3:20 PM.  Patient discussed with Dr. De La Cruz at end of shift.  Disposition to be guided by laboratory results and CT imaging  At the conclusion of the encounter I discussed the results of all of the tests and the disposition. The questions were answered and return precautions provided. The patient or family acknowledged understanding and was agreeable with the care plan.       PPE: Provider wore  "paper mask.     MEDICATIONS GIVEN IN THE EMERGENCY:  Medications - No data to display    NEW PRESCRIPTIONS STARTED AT TODAY'S ER VISIT  New Prescriptions    No medications on file          =================================================================    HPI    Patient information was obtained from: patient     Use of Intrepreter: N/A         Adilia Eric is a 83 year old female with a pertient medical history of stroke, anxiety, hypertension, hyperlipidemia, T2DM, and GERD who presents to the ED for evaluation of diarrhea.    The patient presents with diarrhea that began this morning and has been getting worse over the last 3-4 hours. She describes it as watery but does not have abnormal coloration. She has had decreased appetite recently and had dizziness just prior to arrival. She reports \"off and on\" diarrhea recently but has never been diagnosed with IBS or followed with GI in the past. She started Sertraline recently. She denies suspicious foods, increased caffeine intake, diet changes, recent travel, antibiotics, or other sick contacts.     She denies abdominal pain or any other complaints at this time.       REVIEW OF SYSTEMS   Constitutional:  Denies fever, chills  Respiratory:  Denies productive cough or increased work of breathing  Cardiovascular:  Denies chest pain, palpitations  GI:  Denies abdominal pain, nausea, vomiting. Positive for diarrhea.  Musculoskeletal:  Denies any new muscle/joint swelling  Skin:  Denies rash   Neurologic:  Denies focal weakness. Positive for dizziness.  All systems negative except as marked.     PAST MEDICAL HISTORY:  Past Medical History:   Diagnosis Date    Depression     Diabetes mellitus (H)     GERD (gastroesophageal reflux disease)     Hyperlipemia     Hypertension     Intracranial hemorrhage (H) 2011    may be amyloid angiopathy; has been stable.    Mastoiditis 2011    Osteoarthritis     TMJ (temporomandibular joint disorder)        PAST SURGICAL HISTORY:  Past " Surgical History:   Procedure Laterality Date    CYST REMOVAL      tongue    HYSTERECTOMY  1996    OOPHORECTOMY Bilateral 1996         CURRENT MEDICATIONS:    No current facility-administered medications for this encounter.    Current Outpatient Medications:     acetaminophen (TYLENOL) 500 MG tablet, Take 1,000 mg by mouth 2 times daily as needed, Disp: , Rfl:     allopurinol (ZYLOPRIM) 100 MG tablet, Take 100 mg by mouth daily, Disp: , Rfl:     amLODIPine (NORVASC) 2.5 MG tablet, TAKE 1 TABLET BY MOUTH EVERY DAY, Disp: 90 tablet, Rfl: 2    atorvastatin (LIPITOR) 40 MG tablet, [ATORVASTATIN (LIPITOR) 40 MG TABLET] Take 40 mg by mouth bedtime., Disp: , Rfl:     baclofen (LIORESAL) 10 MG tablet, [BACLOFEN (LIORESAL) 10 MG TABLET] Take 10 mg by mouth at bedtime. , Disp: , Rfl:     calcium carbonate (OS-MARCELO) 600 mg calcium (1,500 mg) tablet, [CALCIUM CARBONATE (OS-MARCELO) 600 MG CALCIUM (1,500 MG) TABLET] Take 1 tablet by mouth daily. , Disp: , Rfl:     celecoxib (CELEBREX) 200 MG capsule, [CELECOXIB (CELEBREX) 200 MG CAPSULE] Take 200 mg by mouth daily., Disp: , Rfl:     CONTOUR NEXT TEST test strip, 2 times daily, Disp: , Rfl:     cyanocobalamin 1000 MCG tablet, [CYANOCOBALAMIN 1000 MCG TABLET] Take 1,000 mcg by mouth daily., Disp: , Rfl:     diphenhydrAMINE (BENADRYL) 25 mg tablet, [DIPHENHYDRAMINE (BENADRYL) 25 MG TABLET] Take 25 mg by mouth at bedtime., Disp: , Rfl:     ELIQUIS ANTICOAGULANT 5 MG tablet, TAKE 1 TABLET BY MOUTH TWICE DAILY TO THIN BLOOD, Disp: , Rfl:     furosemide (LASIX) 20 MG tablet, Take 20 mg daily x 3 days, Disp: 5 tablet, Rfl: 0    hydrOXYzine (ATARAX) 25 MG tablet, TAKE 1 TABLET BY MOUTH UP TO 3 TIMES DAILY AS NEEDED FOR ANXIETY, Disp: , Rfl:     lactobacillus combo no.11 15 billion cell CpSP, Take 1 capsule by mouth daily, Disp: , Rfl:     lisinopril (ZESTRIL) 20 MG tablet, TAKE 1 TABLET BY MOUTH TWICE A DAY FOR BLOOD PRESSURE, Disp: , Rfl:     LORazepam (ATIVAN) 0.5 MG tablet, [LORAZEPAM  (ATIVAN) 0.5 MG TABLET] Take 0.5 mg by mouth 2 (two) times a day as needed. , Disp: , Rfl:     magnesium oxide (MAG-OX) 400 mg tablet, [MAGNESIUM OXIDE (MAG-OX) 400 MG TABLET] Take 400 mg by mouth daily., Disp: , Rfl:     metFORMIN (GLUCOPHAGE-XR) 500 MG 24 hr tablet, [METFORMIN (GLUCOPHAGE-XR) 500 MG 24 HR TABLET] TAKE 1 TABLET BY MOUTH EVERY DAY FOR DIABETES, Disp: , Rfl: 1    Microlet Lancets MISC, TEST BLOOD SUGAR 2 X'S DAILY DX: E11.9, Disp: , Rfl:     sotalol (BETAPACE) 80 MG tablet, Take 1 tablet (80 mg) by mouth 2 times daily, Disp: 180 tablet, Rfl: 3    ALLERGIES:  Allergies   Allergen Reactions    Seasonal Allergies        FAMILY HISTORY:  Family History   Problem Relation Age of Onset    Heart Disease Father     No Known Problems Mother     No Known Problems Sister     No Known Problems Daughter     No Known Problems Maternal Grandmother     No Known Problems Maternal Grandfather     No Known Problems Paternal Grandmother     No Known Problems Paternal Grandfather     No Known Problems Maternal Aunt     No Known Problems Paternal Aunt     Hereditary Breast and Ovarian Cancer Syndrome No family hx of     Breast Cancer No family hx of     Cancer No family hx of     Colon Cancer No family hx of     Endometrial Cancer No family hx of     Ovarian Cancer No family hx of        SOCIAL HISTORY:   Social History     Socioeconomic History    Marital status:    Tobacco Use    Smoking status: Former    Smokeless tobacco: Never   Substance and Sexual Activity    Alcohol use: Yes     Alcohol/week: 1.7 standard drinks of alcohol    Drug use: No       VITALS:  Patient Vitals for the past 24 hrs:   BP Temp Temp src Pulse Resp SpO2 Weight   03/22/24 1419 (!) 182/106 97.7  F (36.5  C) Oral 102 20 97 % 59 kg (130 lb)        PHYSICAL EXAM    Constitutional:  Awake, alert, in mild distress  HENT:  Normocephalic, Atraumatic. Bilateral external ears normal. Oropharynx moist. Nose normal. Neck- Normal range of motion with  no guarding,  Supple, No stridor.   Eyes:  PERRL, EOMI with no signs of entrapment, Conjunctiva normal, No discharge.   Respiratory:  Normal breath sounds, No respiratory distress, No wheezing.    Cardiovascular:  Normal heart rate, Normal rhythm, No appreciable rubs or gallops.   GI:  Soft, No tenderness, No distension, No palpable masses  Musculoskeletal:  No edema. Good range of motion in all major joints. No tenderness to palpation or major deformities noted.  Integument:  Warm, Dry, No erythema, No rash.   Neurologic:  Alert & oriented, Normal motor function, Normal sensory function, No focal deficits noted.   Psychiatric:  Affect normal, Judgment normal, Mood normal.     LAB:  All pertinent labs reviewed and interpreted.       RADIOLOGY:  Reviewed all pertinent imaging. Please see official radiology report.  No orders to display         I, Sung Garcia, am serving as a scribe to document services personally performed by Soham Gaming MD, based on my observation and the provider's statements to me. I, Soham Gaming MD attest that Sung Garcia is acting in a scribe capacity, has observed my performance of the services and has documented them in accordance with my direction.    Soham Gaming M.D.  Emergency Medicine  [facility] EMERGENCY DEPARTMENT     Soham Gaming MD  03/22/24 6927

## 2024-03-22 NOTE — ED NOTES
Bed: JN-28  Expected date:   Expected time:   Means of arrival:   Comments:  [facility]- 83to F Dizziness, diarrhea

## 2024-03-22 NOTE — ED TRIAGE NOTES
Patient arrives by medics from home for evaluation of diarrhea and generalized weakness that began this am.  Patient was recently started on Sertraline.

## 2024-03-22 NOTE — ED NOTES
EMERGENCY DEPARTMENT SIGN OUT NOTE        ED COURSE AND MEDICAL DECISION MAKING  83-year-old female who presented to the ED for evaluation of generalized weakness and diarrhea was checked out to be by Dr. Gaming.  At the time of signout the patient had labs and abdominal CT scan pending.    CRP, lactic acid, CBC, BMP, and magnesium were all reassuring.  The patient's total bilirubin level was slightly elevated 1.7.  Testing for COVID and influenza were both negative.  Urinalysis shows a possible urinary tract infection.    CT scan of the abdomen shows evidence of severe sigmoid diverticulosis.  However, there is no evidence of diverticulitis.    The patient was reevaluated and both she and her family was were informed of the workup results.  The patient was informed that her generalized weakness is likely rated to dehydration from her diarrhea.  The patient and her members were informed that a urinary tract infection could also be contributing factor.  Hemorrhoids note that the patient has been treated recently for at least 2 urinary tract infections in the last few months.  The patient stated that she was feeling much better and that her generalized weakness had improved with the IV fluids.  The patient was given a dose of Keflex here in the ED to treat the urinary tract infection.  Following this the family felt comfortable returning home.  The patient was sent home with Keflex to take for the urinary tract infection.  The patient was instructed to follow-up with her primary care provider for reevaluation or to return back to ED sooner for any worsening generalized weakness, abdominal pain, vomiting, fevers, or any other new or concerning symptoms.    Patient was signed out to me by Dr Soham Gaming at 3:22 PM  6:48 PM I reviewed patient's labs and imaging.   8:30 PM Reassessed and updated patient with findings. We discussed the plan for discharge and the patient is agreeable. Reviewed supportive cares, symptomatic  treatment, outpatient follow up, and reasons to return to the Emergency Department. Patient to be discharged by ED RN.       In brief, Adilia Erci is a 83 year old female who initially presented diarrhea and generalized weakness.     At time of sign out, disposition was pending     FINAL IMPRESSION    1. Diarrhea, unspecified type    2. Generalized weakness    3. Urinary tract infection with hematuria, site unspecified        ED MEDS  Medications   sodium chloride 0.9 % infusion ( Intravenous Rate/Dose Verify 3/22/24 2016)   cephALEXin (KEFLEX) capsule 500 mg (has no administration in time range)   LORazepam (ATIVAN) injection 3 mg (3 mg Intravenous $Given 3/22/24 1540)   iopamidol (ISOVUE-370) solution 64 mL (64 mLs Intravenous $Given 3/22/24 7097)       LAB  Labs Ordered and Resulted from Time of ED Arrival to Time of ED Departure   COMPREHENSIVE METABOLIC PANEL - Abnormal       Result Value    Sodium 131 (*)     Potassium 4.0      Carbon Dioxide (CO2) 23      Anion Gap 12      Urea Nitrogen 7.7 (*)     Creatinine 0.62      GFR Estimate 88      Calcium 10.1      Chloride 96 (*)     Glucose 148 (*)     Alkaline Phosphatase 134      AST 29      ALT 21      Protein Total 6.9      Albumin 4.3      Bilirubin Total 1.7 (*)    ROUTINE UA WITH MICROSCOPIC REFLEX TO CULTURE - Abnormal    Color Urine Colorless      Appearance Urine Clear      Glucose Urine Negative      Bilirubin Urine Negative      Ketones Urine Negative      Specific Gravity Urine 1.031 (*)     Blood Urine Negative      pH Urine 7.5 (*)     Protein Albumin Urine Negative      Urobilinogen Urine <2.0      Nitrite Urine Negative      Leukocyte Esterase Urine 250 Rylie/uL (*)     Mucus Urine Present (*)     RBC Urine 1      WBC Urine 7 (*)     Squamous Epithelials Urine <1     CBC WITH PLATELETS - Normal    WBC Count 7.6      RBC Count 4.94      Hemoglobin 15.5      Hematocrit 46.7      MCV 95      MCH 31.4      MCHC 33.2      RDW 12.4      Platelet Count  263     MAGNESIUM - Normal    Magnesium 1.9     CRP INFLAMMATION - Normal    CRP Inflammation <3.00     LACTIC ACID WHOLE BLOOD - Normal    Lactic Acid 1.9     INFLUENZA A/B, RSV, & SARS-COV2 PCR - Normal    Influenza A PCR Negative      Influenza B PCR Negative      RSV PCR Negative      SARS CoV2 PCR Negative     URINE CULTURE       EKG  None    RADIOLOGY    CT Abdomen Pelvis w Contrast   Final Result   IMPRESSION:    1.  Severe sigmoid diverticulosis without definite evidence for diverticulitis.          DISCHARGE MEDS  New Prescriptions    CEPHALEXIN (KEFLEX) 500 MG CAPSULE    Take 1 capsule (500 mg) by mouth 3 times daily for 7 days         I, Cassandra Solo, am serving as a scribe to document services personally performed by Kelechi De La Cruz DO, based on my observations and the provider's statements to me.  I, Kelechi De La Cruz DO, attest that Cassandra Solo is acting in a scribe capacity, has observed my performance of the services and has documented them in accordance with my direction.       Kelechi De La Cruz DO  Emergency Medicine  [facility] EMERGENCY DEPARTMENT  [address] [place] [id number] [phone]     Kelechi De La Cruz DO  03/23/24 0003

## 2024-03-23 NOTE — DISCHARGE INSTRUCTIONS
The urinalysis shows a possible urinary tract infection.  All other laboratory test and imaging studies were reassuring.  The generalized weakness is likely noted to dehydration secondary to the diarrhea and possibly the urinary tract infection.    Take the prescribed antibiotic as directed to treat the urinary tract infection.    Follow-up with your primary care provider for reevaluation or return back to ED sooner for any worsening diarrhea, abdominal pain, confusion, fevers, vomiting, or any other new or concerning symptoms.

## 2024-03-24 LAB — BACTERIA UR CULT: ABNORMAL

## 2024-03-25 ENCOUNTER — TELEPHONE (OUTPATIENT)
Dept: EMERGENCY MEDICINE | Facility: HOSPITAL | Age: 84
End: 2024-03-25
Payer: COMMERCIAL

## 2024-03-25 NOTE — TELEPHONE ENCOUNTER
[facility]    Reason for call: Lab Result Notification     Lab Result (including Rx patient on, if applicable).  If culture, copy of lab report at bottom.  Lab Result: Final Urine Culture Report on 3/24/24  [facility] Emergency Dept discharge antibiotic prescribed: Cephalexin (Keflex) 500 mg capsule, 1 capsule (500 mg) by mouth 3 times daily for 7 days.   #1. Bacteria, 10,000 - 50,000 CFU/mL Citrobacter koseri is SUSCEPTIBLE to Antibiotic.    No change in treatment per [facility] ED lab result Urine Culture protocol.     Patient's current Symptoms:   Still having the weakness  Diarrhea persisting but not as bad.  Only one diarrhea stools yesterday/   Has appt on Wednesday    RN Recommendations/Instructions per [place] ED lab result protocol:   [facility] ED lab result protocol utilized: Urine culture    Patient/care giver notified to contact your PCP clinic or return to the Emergency department if your:  Symptoms do not resolve after completing antibiotic.  Symptoms worsen or other concerning symptoms.      David Cage RN

## 2024-04-10 ENCOUNTER — HOSPITAL ENCOUNTER (OUTPATIENT)
Facility: HOSPITAL | Age: 84
Setting detail: OBSERVATION
Discharge: HOME | End: 2024-04-11
Attending: EMERGENCY MEDICINE | Admitting: EMERGENCY MEDICINE
Payer: COMMERCIAL

## 2024-04-10 ENCOUNTER — TRANSFERRED RECORDS (OUTPATIENT)
Dept: HEALTH INFORMATION MANAGEMENT | Facility: CLINIC | Age: 84
End: 2024-04-10

## 2024-04-10 ENCOUNTER — APPOINTMENT (OUTPATIENT)
Dept: CT IMAGING | Facility: HOSPITAL | Age: 84
End: 2024-04-10
Attending: EMERGENCY MEDICINE
Payer: COMMERCIAL

## 2024-04-10 DIAGNOSIS — R00.0 SINUS TACHYCARDIA: ICD-10-CM

## 2024-04-10 DIAGNOSIS — I71.40 ABDOMINAL AORTIC ANEURYSM (AAA) WITHOUT RUPTURE, UNSPECIFIED PART (H): ICD-10-CM

## 2024-04-10 DIAGNOSIS — K57.32 DIVERTICULITIS OF COLON: ICD-10-CM

## 2024-04-10 DIAGNOSIS — I50.31 ACUTE HEART FAILURE WITH PRESERVED EJECTION FRACTION (H): Primary | ICD-10-CM

## 2024-04-10 LAB
ALBUMIN UR-MCNC: NEGATIVE MG/DL
ANION GAP SERPL CALCULATED.3IONS-SCNC: 11 MMOL/L (ref 7–15)
APPEARANCE UR: CLEAR
BACTERIA #/AREA URNS HPF: ABNORMAL /HPF
BASOPHILS # BLD AUTO: 0 10E3/UL (ref 0–0.2)
BASOPHILS NFR BLD AUTO: 0 %
BILIRUB UR QL STRIP: NEGATIVE
BUN SERPL-MCNC: 6.5 MG/DL (ref 8–23)
CALCIUM SERPL-MCNC: 10 MG/DL (ref 8.8–10.2)
CHLORIDE SERPL-SCNC: 95 MMOL/L (ref 98–107)
COLOR UR AUTO: COLORLESS
CREAT SERPL-MCNC: 0.57 MG/DL (ref 0.51–0.95)
DEPRECATED HCO3 PLAS-SCNC: 25 MMOL/L (ref 22–29)
EGFRCR SERPLBLD CKD-EPI 2021: 90 ML/MIN/1.73M2
EOSINOPHIL # BLD AUTO: 0.2 10E3/UL (ref 0–0.7)
EOSINOPHIL NFR BLD AUTO: 2 %
ERYTHROCYTE [DISTWIDTH] IN BLOOD BY AUTOMATED COUNT: 12.3 % (ref 10–15)
GLUCOSE SERPL-MCNC: 118 MG/DL (ref 70–99)
GLUCOSE UR STRIP-MCNC: NEGATIVE MG/DL
HCT VFR BLD AUTO: 45.8 % (ref 35–47)
HGB BLD-MCNC: 15.4 G/DL (ref 11.7–15.7)
HGB UR QL STRIP: NEGATIVE
HOLD SPECIMEN: NORMAL
IMM GRANULOCYTES # BLD: 0 10E3/UL
IMM GRANULOCYTES NFR BLD: 0 %
KETONES UR STRIP-MCNC: NEGATIVE MG/DL
LACTATE SERPL-SCNC: 0.7 MMOL/L (ref 0.7–2)
LEUKOCYTE ESTERASE UR QL STRIP: ABNORMAL
LYMPHOCYTES # BLD AUTO: 1.3 10E3/UL (ref 0.8–5.3)
LYMPHOCYTES NFR BLD AUTO: 14 %
MAGNESIUM SERPL-MCNC: 1.7 MG/DL (ref 1.7–2.3)
MCH RBC QN AUTO: 30.7 PG (ref 26.5–33)
MCHC RBC AUTO-ENTMCNC: 33.6 G/DL (ref 31.5–36.5)
MCV RBC AUTO: 91 FL (ref 78–100)
MONOCYTES # BLD AUTO: 1.1 10E3/UL (ref 0–1.3)
MONOCYTES NFR BLD AUTO: 12 %
NEUTROPHILS # BLD AUTO: 7 10E3/UL (ref 1.6–8.3)
NEUTROPHILS NFR BLD AUTO: 72 %
NITRATE UR QL: NEGATIVE
NRBC # BLD AUTO: 0 10E3/UL
NRBC BLD AUTO-RTO: 0 /100
NT-PROBNP SERPL-MCNC: 265 PG/ML (ref 0–1800)
PH UR STRIP: 8 [PH] (ref 5–7)
PLATELET # BLD AUTO: 262 10E3/UL (ref 150–450)
POTASSIUM SERPL-SCNC: 3.7 MMOL/L (ref 3.4–5.3)
RBC # BLD AUTO: 5.01 10E6/UL (ref 3.8–5.2)
RBC URINE: 1 /HPF
SODIUM SERPL-SCNC: 131 MMOL/L (ref 135–145)
SP GR UR STRIP: 1 (ref 1–1.03)
SQUAMOUS EPITHELIAL: <1 /HPF
TROPONIN T SERPL HS-MCNC: 9 NG/L
TROPONIN T SERPL HS-MCNC: 9 NG/L
TSH SERPL DL<=0.005 MIU/L-ACNC: 0.65 UIU/ML (ref 0.3–4.2)
UROBILINOGEN UR STRIP-MCNC: <2 MG/DL
WBC # BLD AUTO: 9.7 10E3/UL (ref 4–11)
WBC URINE: 5 /HPF

## 2024-04-10 PROCEDURE — 83036 HEMOGLOBIN GLYCOSYLATED A1C: CPT | Performed by: STUDENT IN AN ORGANIZED HEALTH CARE EDUCATION/TRAINING PROGRAM

## 2024-04-10 PROCEDURE — 74177 CT ABD & PELVIS W/CONTRAST: CPT

## 2024-04-10 PROCEDURE — 83735 ASSAY OF MAGNESIUM: CPT | Performed by: EMERGENCY MEDICINE

## 2024-04-10 PROCEDURE — 250N000011 HC RX IP 250 OP 636: Performed by: EMERGENCY MEDICINE

## 2024-04-10 PROCEDURE — 84443 ASSAY THYROID STIM HORMONE: CPT | Performed by: EMERGENCY MEDICINE

## 2024-04-10 PROCEDURE — 36415 COLL VENOUS BLD VENIPUNCTURE: CPT | Performed by: EMERGENCY MEDICINE

## 2024-04-10 PROCEDURE — 85025 COMPLETE CBC W/AUTO DIFF WBC: CPT | Performed by: EMERGENCY MEDICINE

## 2024-04-10 PROCEDURE — 71275 CT ANGIOGRAPHY CHEST: CPT

## 2024-04-10 PROCEDURE — 258N000003 HC RX IP 258 OP 636: Performed by: EMERGENCY MEDICINE

## 2024-04-10 PROCEDURE — 87493 C DIFF AMPLIFIED PROBE: CPT | Mod: XU | Performed by: EMERGENCY MEDICINE

## 2024-04-10 PROCEDURE — 83605 ASSAY OF LACTIC ACID: CPT | Performed by: EMERGENCY MEDICINE

## 2024-04-10 PROCEDURE — 84484 ASSAY OF TROPONIN QUANT: CPT | Performed by: EMERGENCY MEDICINE

## 2024-04-10 PROCEDURE — 96361 HYDRATE IV INFUSION ADD-ON: CPT

## 2024-04-10 PROCEDURE — 87507 IADNA-DNA/RNA PROBE TQ 12-25: CPT | Performed by: EMERGENCY MEDICINE

## 2024-04-10 PROCEDURE — 81001 URINALYSIS AUTO W/SCOPE: CPT | Performed by: EMERGENCY MEDICINE

## 2024-04-10 PROCEDURE — 99223 1ST HOSP IP/OBS HIGH 75: CPT | Performed by: STUDENT IN AN ORGANIZED HEALTH CARE EDUCATION/TRAINING PROGRAM

## 2024-04-10 PROCEDURE — 83880 ASSAY OF NATRIURETIC PEPTIDE: CPT | Performed by: EMERGENCY MEDICINE

## 2024-04-10 PROCEDURE — 80048 BASIC METABOLIC PNL TOTAL CA: CPT | Performed by: EMERGENCY MEDICINE

## 2024-04-10 PROCEDURE — 93005 ELECTROCARDIOGRAM TRACING: CPT | Performed by: EMERGENCY MEDICINE

## 2024-04-10 PROCEDURE — 99285 EMERGENCY DEPT VISIT HI MDM: CPT | Mod: 25

## 2024-04-10 RX ORDER — METRONIDAZOLE 500 MG/100ML
500 INJECTION, SOLUTION INTRAVENOUS ONCE
Status: COMPLETED | OUTPATIENT
Start: 2024-04-11 | End: 2024-04-11

## 2024-04-10 RX ORDER — CEFTRIAXONE 1 G/1
1 INJECTION, POWDER, FOR SOLUTION INTRAMUSCULAR; INTRAVENOUS EVERY 24 HOURS
Status: DISCONTINUED | OUTPATIENT
Start: 2024-04-11 | End: 2024-04-11

## 2024-04-10 RX ORDER — IOPAMIDOL 755 MG/ML
90 INJECTION, SOLUTION INTRAVASCULAR ONCE
Status: COMPLETED | OUTPATIENT
Start: 2024-04-10 | End: 2024-04-10

## 2024-04-10 RX ORDER — METRONIDAZOLE 500 MG/100ML
500 INJECTION, SOLUTION INTRAVENOUS ONCE
Status: DISCONTINUED | OUTPATIENT
Start: 2024-04-11 | End: 2024-04-10

## 2024-04-10 RX ORDER — CIPROFLOXACIN 2 MG/ML
400 INJECTION, SOLUTION INTRAVENOUS ONCE
Status: DISCONTINUED | OUTPATIENT
Start: 2024-04-11 | End: 2024-04-10

## 2024-04-10 RX ADMIN — IOPAMIDOL 90 ML: 755 INJECTION, SOLUTION INTRAVENOUS at 22:26

## 2024-04-10 RX ADMIN — SODIUM CHLORIDE 500 ML: 9 INJECTION, SOLUTION INTRAVENOUS at 21:49

## 2024-04-10 ASSESSMENT — COLUMBIA-SUICIDE SEVERITY RATING SCALE - C-SSRS
6. HAVE YOU EVER DONE ANYTHING, STARTED TO DO ANYTHING, OR PREPARED TO DO ANYTHING TO END YOUR LIFE?: NO
1. IN THE PAST MONTH, HAVE YOU WISHED YOU WERE DEAD OR WISHED YOU COULD GO TO SLEEP AND NOT WAKE UP?: NO
2. HAVE YOU ACTUALLY HAD ANY THOUGHTS OF KILLING YOURSELF IN THE PAST MONTH?: NO

## 2024-04-10 ASSESSMENT — ENCOUNTER SYMPTOMS
ABDOMINAL PAIN: 0
NAUSEA: 0
PALPITATIONS: 1
FEVER: 0
COUGH: 0
SHORTNESS OF BREATH: 0
VOMITING: 0
DYSURIA: 0
DIARRHEA: 1

## 2024-04-10 ASSESSMENT — ACTIVITIES OF DAILY LIVING (ADL)
ADLS_ACUITY_SCORE: 35

## 2024-04-11 VITALS
TEMPERATURE: 98.1 F | OXYGEN SATURATION: 82 % | WEIGHT: 129 LBS | SYSTOLIC BLOOD PRESSURE: 164 MMHG | BODY MASS INDEX: 25.32 KG/M2 | RESPIRATION RATE: 17 BRPM | HEART RATE: 86 BPM | HEIGHT: 60 IN | DIASTOLIC BLOOD PRESSURE: 92 MMHG

## 2024-04-11 LAB
ADV 40+41 DNA STL QL NAA+NON-PROBE: NEGATIVE
ANION GAP SERPL CALCULATED.3IONS-SCNC: 12 MMOL/L (ref 7–15)
ASTRO TYP 1-8 RNA STL QL NAA+NON-PROBE: NEGATIVE
BUN SERPL-MCNC: 4 MG/DL (ref 8–23)
C CAYETANENSIS DNA STL QL NAA+NON-PROBE: NEGATIVE
C DIFF TOX B STL QL: NEGATIVE
CALCIUM SERPL-MCNC: 9.6 MG/DL (ref 8.8–10.2)
CAMPYLOBACTER DNA SPEC NAA+PROBE: NEGATIVE
CHLORIDE SERPL-SCNC: 99 MMOL/L (ref 98–107)
CREAT SERPL-MCNC: 0.49 MG/DL (ref 0.51–0.95)
CRYPTOSP DNA STL QL NAA+NON-PROBE: NEGATIVE
DEPRECATED HCO3 PLAS-SCNC: 24 MMOL/L (ref 22–29)
E COLI O157 DNA STL QL NAA+NON-PROBE: NORMAL
E HISTOLYT DNA STL QL NAA+NON-PROBE: NEGATIVE
EAEC ASTA GENE ISLT QL NAA+PROBE: NEGATIVE
EC STX1+STX2 GENES STL QL NAA+NON-PROBE: NEGATIVE
EGFRCR SERPLBLD CKD-EPI 2021: >90 ML/MIN/1.73M2
EPEC EAE GENE STL QL NAA+NON-PROBE: NEGATIVE
ERYTHROCYTE [DISTWIDTH] IN BLOOD BY AUTOMATED COUNT: 12.2 % (ref 10–15)
ETEC LTA+ST1A+ST1B TOX ST NAA+NON-PROBE: NEGATIVE
G LAMBLIA DNA STL QL NAA+NON-PROBE: NEGATIVE
GLUCOSE BLDC GLUCOMTR-MCNC: 118 MG/DL (ref 70–99)
GLUCOSE BLDC GLUCOMTR-MCNC: 126 MG/DL (ref 70–99)
GLUCOSE BLDC GLUCOMTR-MCNC: 163 MG/DL (ref 70–99)
GLUCOSE BLDC GLUCOMTR-MCNC: 164 MG/DL (ref 70–99)
GLUCOSE SERPL-MCNC: 158 MG/DL (ref 70–99)
HBA1C MFR BLD: 6 %
HCT VFR BLD AUTO: 45.2 % (ref 35–47)
HGB BLD-MCNC: 15.3 G/DL (ref 11.7–15.7)
MAGNESIUM SERPL-MCNC: 1.7 MG/DL (ref 1.7–2.3)
MCH RBC QN AUTO: 31.3 PG (ref 26.5–33)
MCHC RBC AUTO-ENTMCNC: 33.8 G/DL (ref 31.5–36.5)
MCV RBC AUTO: 92 FL (ref 78–100)
NOROVIRUS GI+II RNA STL QL NAA+NON-PROBE: NEGATIVE
P SHIGELLOIDES DNA STL QL NAA+NON-PROBE: NEGATIVE
PLATELET # BLD AUTO: 250 10E3/UL (ref 150–450)
POTASSIUM SERPL-SCNC: 3.6 MMOL/L (ref 3.4–5.3)
RBC # BLD AUTO: 4.89 10E6/UL (ref 3.8–5.2)
RVA RNA STL QL NAA+NON-PROBE: NEGATIVE
SALMONELLA SP RPOD STL QL NAA+PROBE: NEGATIVE
SAPO I+II+IV+V RNA STL QL NAA+NON-PROBE: NEGATIVE
SHIGELLA SP+EIEC IPAH ST NAA+NON-PROBE: NEGATIVE
SODIUM SERPL-SCNC: 135 MMOL/L (ref 135–145)
V CHOLERAE DNA SPEC QL NAA+PROBE: NEGATIVE
VIBRIO DNA SPEC NAA+PROBE: NEGATIVE
WBC # BLD AUTO: 7.7 10E3/UL (ref 4–11)
Y ENTEROCOL DNA STL QL NAA+PROBE: NEGATIVE

## 2024-04-11 PROCEDURE — 80048 BASIC METABOLIC PNL TOTAL CA: CPT | Performed by: STUDENT IN AN ORGANIZED HEALTH CARE EDUCATION/TRAINING PROGRAM

## 2024-04-11 PROCEDURE — 96375 TX/PRO/DX INJ NEW DRUG ADDON: CPT

## 2024-04-11 PROCEDURE — 250N000011 HC RX IP 250 OP 636: Performed by: EMERGENCY MEDICINE

## 2024-04-11 PROCEDURE — G0378 HOSPITAL OBSERVATION PER HR: HCPCS

## 2024-04-11 PROCEDURE — 250N000013 HC RX MED GY IP 250 OP 250 PS 637: Performed by: STUDENT IN AN ORGANIZED HEALTH CARE EDUCATION/TRAINING PROGRAM

## 2024-04-11 PROCEDURE — 250N000011 HC RX IP 250 OP 636: Performed by: STUDENT IN AN ORGANIZED HEALTH CARE EDUCATION/TRAINING PROGRAM

## 2024-04-11 PROCEDURE — 85027 COMPLETE CBC AUTOMATED: CPT | Performed by: STUDENT IN AN ORGANIZED HEALTH CARE EDUCATION/TRAINING PROGRAM

## 2024-04-11 PROCEDURE — 83735 ASSAY OF MAGNESIUM: CPT | Performed by: STUDENT IN AN ORGANIZED HEALTH CARE EDUCATION/TRAINING PROGRAM

## 2024-04-11 PROCEDURE — 82962 GLUCOSE BLOOD TEST: CPT

## 2024-04-11 PROCEDURE — 99239 HOSP IP/OBS DSCHRG MGMT >30: CPT | Performed by: FAMILY MEDICINE

## 2024-04-11 PROCEDURE — 36415 COLL VENOUS BLD VENIPUNCTURE: CPT | Performed by: STUDENT IN AN ORGANIZED HEALTH CARE EDUCATION/TRAINING PROGRAM

## 2024-04-11 PROCEDURE — 96374 THER/PROPH/DIAG INJ IV PUSH: CPT

## 2024-04-11 PROCEDURE — 250N000013 HC RX MED GY IP 250 OP 250 PS 637: Performed by: FAMILY MEDICINE

## 2024-04-11 PROCEDURE — 250N000012 HC RX MED GY IP 250 OP 636 PS 637: Performed by: STUDENT IN AN ORGANIZED HEALTH CARE EDUCATION/TRAINING PROGRAM

## 2024-04-11 PROCEDURE — 258N000003 HC RX IP 258 OP 636: Performed by: STUDENT IN AN ORGANIZED HEALTH CARE EDUCATION/TRAINING PROGRAM

## 2024-04-11 RX ORDER — MAGNESIUM OXIDE 400 MG/1
400 TABLET ORAL DAILY
Status: DISCONTINUED | OUTPATIENT
Start: 2024-04-11 | End: 2024-04-11 | Stop reason: HOSPADM

## 2024-04-11 RX ORDER — ACETAMINOPHEN 500 MG
1000 TABLET ORAL 2 TIMES DAILY PRN
Status: DISCONTINUED | OUTPATIENT
Start: 2024-04-11 | End: 2024-04-11

## 2024-04-11 RX ORDER — LISINOPRIL 20 MG/1
20 TABLET ORAL DAILY
Status: DISCONTINUED | OUTPATIENT
Start: 2024-04-11 | End: 2024-04-11

## 2024-04-11 RX ORDER — SERTRALINE HYDROCHLORIDE 25 MG/1
25 TABLET, FILM COATED ORAL DAILY
Status: DISCONTINUED | OUTPATIENT
Start: 2024-04-11 | End: 2024-04-11 | Stop reason: HOSPADM

## 2024-04-11 RX ORDER — ACETAMINOPHEN 650 MG/1
650 SUPPOSITORY RECTAL EVERY 4 HOURS PRN
Status: DISCONTINUED | OUTPATIENT
Start: 2024-04-11 | End: 2024-04-11 | Stop reason: HOSPADM

## 2024-04-11 RX ORDER — SOTALOL HYDROCHLORIDE 80 MG/1
80 TABLET ORAL 2 TIMES DAILY
Status: DISCONTINUED | OUTPATIENT
Start: 2024-04-11 | End: 2024-04-11 | Stop reason: HOSPADM

## 2024-04-11 RX ORDER — HYDROXYZINE HYDROCHLORIDE 25 MG/1
25 TABLET, FILM COATED ORAL 3 TIMES DAILY PRN
Status: DISCONTINUED | OUTPATIENT
Start: 2024-04-11 | End: 2024-04-11 | Stop reason: HOSPADM

## 2024-04-11 RX ORDER — ATORVASTATIN CALCIUM 40 MG/1
40 TABLET, FILM COATED ORAL AT BEDTIME
Status: DISCONTINUED | OUTPATIENT
Start: 2024-04-11 | End: 2024-04-11 | Stop reason: HOSPADM

## 2024-04-11 RX ORDER — CELECOXIB 200 MG/1
200 CAPSULE ORAL DAILY
Status: DISCONTINUED | OUTPATIENT
Start: 2024-04-11 | End: 2024-04-11 | Stop reason: HOSPADM

## 2024-04-11 RX ORDER — SERTRALINE HYDROCHLORIDE 25 MG/1
25 TABLET, FILM COATED ORAL DAILY
COMMUNITY

## 2024-04-11 RX ORDER — ACETAMINOPHEN 325 MG/1
650 TABLET ORAL EVERY 4 HOURS PRN
Status: DISCONTINUED | OUTPATIENT
Start: 2024-04-11 | End: 2024-04-11 | Stop reason: HOSPADM

## 2024-04-11 RX ORDER — METFORMIN HCL 500 MG
500 TABLET, EXTENDED RELEASE 24 HR ORAL
Status: DISCONTINUED | OUTPATIENT
Start: 2024-04-11 | End: 2024-04-11 | Stop reason: HOSPADM

## 2024-04-11 RX ORDER — CETIRIZINE HYDROCHLORIDE 10 MG/1
10 TABLET ORAL DAILY
COMMUNITY

## 2024-04-11 RX ORDER — ALENDRONATE SODIUM 70 MG/1
70 TABLET ORAL
Status: DISCONTINUED | OUTPATIENT
Start: 2024-04-11 | End: 2024-04-11

## 2024-04-11 RX ORDER — AMLODIPINE BESYLATE 2.5 MG/1
2.5 TABLET ORAL DAILY
Status: DISCONTINUED | OUTPATIENT
Start: 2024-04-11 | End: 2024-04-11 | Stop reason: HOSPADM

## 2024-04-11 RX ORDER — ALLOPURINOL 100 MG/1
100 TABLET ORAL DAILY
Status: DISCONTINUED | OUTPATIENT
Start: 2024-04-11 | End: 2024-04-11 | Stop reason: HOSPADM

## 2024-04-11 RX ORDER — DEXTROSE MONOHYDRATE 25 G/50ML
25-50 INJECTION, SOLUTION INTRAVENOUS
Status: DISCONTINUED | OUTPATIENT
Start: 2024-04-11 | End: 2024-04-11 | Stop reason: HOSPADM

## 2024-04-11 RX ORDER — NICOTINE POLACRILEX 4 MG
15-30 LOZENGE BUCCAL
Status: DISCONTINUED | OUTPATIENT
Start: 2024-04-11 | End: 2024-04-11 | Stop reason: HOSPADM

## 2024-04-11 RX ORDER — LISINOPRIL 20 MG/1
20 TABLET ORAL 2 TIMES DAILY
Status: DISCONTINUED | OUTPATIENT
Start: 2024-04-11 | End: 2024-04-11 | Stop reason: HOSPADM

## 2024-04-11 RX ORDER — METRONIDAZOLE 500 MG/1
500 TABLET ORAL 3 TIMES DAILY
Status: DISCONTINUED | OUTPATIENT
Start: 2024-04-11 | End: 2024-04-11 | Stop reason: HOSPADM

## 2024-04-11 RX ORDER — ALENDRONATE SODIUM 70 MG/1
70 TABLET ORAL
COMMUNITY

## 2024-04-11 RX ORDER — CIPROFLOXACIN 500 MG/1
500 TABLET, FILM COATED ORAL 2 TIMES DAILY
Qty: 14 TABLET | Refills: 0 | Status: SHIPPED | OUTPATIENT
Start: 2024-04-11 | End: 2024-04-18

## 2024-04-11 RX ORDER — LORAZEPAM 0.5 MG/1
0.25 TABLET ORAL ONCE
Status: COMPLETED | OUTPATIENT
Start: 2024-04-11 | End: 2024-04-11

## 2024-04-11 RX ORDER — SODIUM CHLORIDE 9 MG/ML
INJECTION, SOLUTION INTRAVENOUS CONTINUOUS
Status: DISCONTINUED | OUTPATIENT
Start: 2024-04-11 | End: 2024-04-11 | Stop reason: HOSPADM

## 2024-04-11 RX ORDER — LACTOBACILLUS RHAMNOSUS GG 10B CELL
1 CAPSULE ORAL DAILY
Status: DISCONTINUED | OUTPATIENT
Start: 2024-04-11 | End: 2024-04-11 | Stop reason: HOSPADM

## 2024-04-11 RX ORDER — CIPROFLOXACIN 2 MG/ML
400 INJECTION, SOLUTION INTRAVENOUS EVERY 12 HOURS
Status: DISCONTINUED | OUTPATIENT
Start: 2024-04-11 | End: 2024-04-11 | Stop reason: HOSPADM

## 2024-04-11 RX ORDER — CETIRIZINE HYDROCHLORIDE 10 MG/1
10 TABLET ORAL DAILY
Status: DISCONTINUED | OUTPATIENT
Start: 2024-04-11 | End: 2024-04-11 | Stop reason: HOSPADM

## 2024-04-11 RX ORDER — LANOLIN ALCOHOL/MO/W.PET/CERES
1000 CREAM (GRAM) TOPICAL DAILY
Status: DISCONTINUED | OUTPATIENT
Start: 2024-04-11 | End: 2024-04-11 | Stop reason: HOSPADM

## 2024-04-11 RX ORDER — ENOXAPARIN SODIUM 100 MG/ML
40 INJECTION SUBCUTANEOUS EVERY 24 HOURS
Status: DISCONTINUED | OUTPATIENT
Start: 2024-04-11 | End: 2024-04-11

## 2024-04-11 RX ORDER — METRONIDAZOLE 500 MG/1
500 TABLET ORAL 3 TIMES DAILY
Qty: 21 TABLET | Refills: 0 | Status: SHIPPED | OUTPATIENT
Start: 2024-04-11 | End: 2024-04-18

## 2024-04-11 RX ORDER — BACLOFEN 10 MG/1
10 TABLET ORAL AT BEDTIME
Status: DISCONTINUED | OUTPATIENT
Start: 2024-04-11 | End: 2024-04-11 | Stop reason: HOSPADM

## 2024-04-11 RX ADMIN — CEFTRIAXONE SODIUM 1 G: 1 INJECTION, POWDER, FOR SOLUTION INTRAMUSCULAR; INTRAVENOUS at 00:12

## 2024-04-11 RX ADMIN — SERTRALINE HYDROCHLORIDE 25 MG: 25 TABLET ORAL at 09:27

## 2024-04-11 RX ADMIN — HYDROXYZINE HYDROCHLORIDE 25 MG: 25 TABLET, FILM COATED ORAL at 03:41

## 2024-04-11 RX ADMIN — ACETAMINOPHEN 650 MG: 325 TABLET ORAL at 14:57

## 2024-04-11 RX ADMIN — LORAZEPAM 0.25 MG: 0.5 TABLET ORAL at 09:25

## 2024-04-11 RX ADMIN — INSULIN ASPART 1 UNITS: 100 INJECTION, SOLUTION INTRAVENOUS; SUBCUTANEOUS at 11:58

## 2024-04-11 RX ADMIN — METRONIDAZOLE 500 MG: 500 INJECTION, SOLUTION INTRAVENOUS at 00:12

## 2024-04-11 RX ADMIN — CYANOCOBALAMIN TAB 1000 MCG 1000 MCG: 1000 TAB at 09:26

## 2024-04-11 RX ADMIN — SODIUM CHLORIDE: 9 INJECTION, SOLUTION INTRAVENOUS at 03:11

## 2024-04-11 RX ADMIN — CIPROFLOXACIN 400 MG: 400 INJECTION, SOLUTION INTRAVENOUS at 06:30

## 2024-04-11 RX ADMIN — METRONIDAZOLE 500 MG: 500 TABLET ORAL at 13:53

## 2024-04-11 RX ADMIN — SOTALOL HYDROCHLORIDE 80 MG: 80 TABLET ORAL at 09:26

## 2024-04-11 RX ADMIN — METFORMIN ER 500 MG 500 MG: 500 TABLET ORAL at 17:38

## 2024-04-11 RX ADMIN — AMLODIPINE BESYLATE 2.5 MG: 2.5 TABLET ORAL at 09:26

## 2024-04-11 RX ADMIN — LISINOPRIL 20 MG: 20 TABLET ORAL at 09:45

## 2024-04-11 RX ADMIN — CETIRIZINE HYDROCHLORIDE 10 MG: 10 TABLET, FILM COATED ORAL at 09:25

## 2024-04-11 RX ADMIN — ALLOPURINOL 100 MG: 100 TABLET ORAL at 09:25

## 2024-04-11 RX ADMIN — METRONIDAZOLE 500 MG: 500 TABLET ORAL at 08:05

## 2024-04-11 RX ADMIN — APIXABAN 2.5 MG: 2.5 TABLET, FILM COATED ORAL at 09:26

## 2024-04-11 RX ADMIN — Medication 1 CAPSULE: at 09:45

## 2024-04-11 ASSESSMENT — ACTIVITIES OF DAILY LIVING (ADL)
ADLS_ACUITY_SCORE: 27
ADLS_ACUITY_SCORE: 27
ADLS_ACUITY_SCORE: 22
ADLS_ACUITY_SCORE: 27
ADLS_ACUITY_SCORE: 22
ADLS_ACUITY_SCORE: 27
ADLS_ACUITY_SCORE: 27
ADLS_ACUITY_SCORE: 35
ADLS_ACUITY_SCORE: 27
ADLS_ACUITY_SCORE: 22
ADLS_ACUITY_SCORE: 27
ADLS_ACUITY_SCORE: 22
ADLS_ACUITY_SCORE: 27

## 2024-04-11 NOTE — ED PROVIDER NOTES
EMERGENCY DEPARTMENT ENCOUNTER      NAME: Adilia Eric  AGE: 83 year old female  YOB: 1940  MRN: [id number]  EVALUATION DATE & TIME: 4/10/2024  8:16 PM    PCP: Malcolm Carrington    ED PROVIDER: Azalea Cesar M.D.        Chief Complaint   Patient presents with    Tachycardia         FINAL IMPRESSION:    1. Sinus tachycardia    2. Abdominal aortic aneurysm (AAA) without rupture, unspecified part (H24)    3. Diverticulitis of colon            MEDICAL DECISION MAKING:    Adilia Eric is a 83 year old female with history of anxiety, atrial fibrillation, hypertension, acute heart failure with preserved ejection fraction, who presents to the ER with complaints of tachycardia and lightheadedness.    Patient has been having diarrhea, multiple episodes a for about a month.  At this time I have concerns that the patient is dehydrated.  Every time she stands up and tries to have any type of minimal activity she becomes quite tachycardic in a sinus tachycardia.  This is witnessed here in the ER.  Imaging shows a mild diverticulitis.  Laboratories overall unremarkable.  Urinalysis negative.  Plan at this time is admission to the hospital for ongoing fluid hydration and antibiotics for diverticulitis.  She has been accepted to the hospital by the hospitalist will go to medical telemetry to observation status.  Patient family aware of this plan and agree and all of their questions have been answered.          ED COURSE:  9:25 PM  I met with the patient to gather history and perform my exam. ED course and treatment discussed.    21:37:07 ED Notes Addendum Pt ambulated around the room with monitor on. Pt's HR increased to 130 and pt desated down to 88% RA. When pt got back into bed pt's HR came down and her O2 level went back up to 91% RA. Provider notified. Mary Ann Plascencia RN     11:21 PM  Updated family on patient's results.  Patient was currently up to a bedside commode.  Even with getting  up to just pivot onto the commode she became quite tachycardic in a sinus tachycardia in the 120s.  This does improve with some time at rest.  I suspect there is a degree of dehydration.  CT scan with some diverticulitis.  Plan at this time is admission to the hospital for IV antibiotics, IV fluids.  She gets quite lightheaded with these episodes of tachycardia with any mild exertion or activity.  I do not feel she is a good candidate for discharge home on oral antibiotics at this point.  He agrees with this plan.  Will update patient when she is off the commode.      11:47 PM  She has been accepted to the hospital by , hospitalist.  We discussed antibiotics and she suggested Cipro Flagyl, but unfortunately I got a warning of potential for severe hypoglycemia with the Cipro and therefore was changed to Flagyl and ceftriaxone.  Will defer ongoing antibiotics to the admitting service.    Patient does not appear toxic or septic but she continues to be symptomatic with standing up in any type of minimal exertion where she becomes tachycardic with a sinus tachycardia.  She agrees with the plan for admission.  Family agrees as well.  Will continue with fluid hydration.  All of her questions have been answered.  No studies have been sent and are pending.    I do not think that this represents ACS, PE, ruptured AAA, aortic dissection, bowel obstruction, bowel ischemia, cholecystitis, pancreatitis, appendicitis, diverticulitis, kidney stone, pyelonephritis, incarcerated or strangulated hernia, ovarian/testicular torsion, PID, ectopic pregnancy, tubo-ovarian abscess, viscus perforation, perforated GI ulcer, or other such etiologies at this time.      At the conclusion of the encounter I discussed the results of all of the tests and the disposition. Their questions were answered. The patient (and any family present) acknowledged understanding and were agreeable with the care plan.      CONSULTANTS:  Hospitalist -   "Lola       MEDICATIONS GIVEN IN THE EMERGENCY:  Medications   sodium chloride 0.9% BOLUS 500 mL (500 mLs Intravenous $New Bag 4/10/24 2149)   metroNIDAZOLE (FLAGYL) infusion 500 mg (has no administration in time range)   cefTRIAXone (ROCEPHIN) 1 g vial to attach to  mL bag for ADULTS or NS 50 mL bag for PEDS (has no administration in time range)   iopamidol (ISOVUE-370) solution 90 mL (90 mLs Intravenous $Given 4/10/24 2226)           NEW PRESCRIPTIONS STARTED AT TODAY'S ER VISIT     Medication List      There are no discharge medications for this visit.             CONDITION:  stable        DISPOSITION:  Med surg tele obs as accepted by Dr. Davila, hospitalist         =================================================================  =================================================================  TRIAGE ASSESSMENT:  Pt presents from home with [facility]. Called EMS d/t \"heart racing\". Hx afib, anxiety. Sinus tach, no chest pain. Takes Ativan and Zoloft at home. . /116.     Triage Assessment (Adult)       Row Name 04/10/24 2021          Triage Assessment    Airway WDL WDL        Respiratory WDL    Respiratory WDL WDL        Cardiac WDL    Cardiac WDL X     Cardiac Rhythm NSR        Cognitive/Neuro/Behavioral WDL    Cognitive/Neuro/Behavioral WDL WDL                       ED Triage Vitals [04/10/24 2019]   Enc Vitals Group      BP (!) 178/114      Pulse 101      Resp 16      Temp 98.1  F (36.7  C)      Temp src Oral      SpO2 94 %      Weight 58.5 kg (129 lb)      Height 1.524 m (5')          ================================================================  ================================================================    HPI    Patient information was obtained from: patient and family    Use of Intrepreter: N/A     Adiliarocio Eric is a 83 year old female with history of anxiety, atrial fibrillation, hypertension, acute heart failure with preserved ejection fraction, who presents to the ER with " complaints of tachycardia and lightheadedness.    She states that when she has gotten up today to go to the bathroom she has noted that her heart starts racing and she starts to feel little lightheaded.  This resolves after a few minutes of sitting down but seems to recur whenever she gets up to walk to the bathroom.    She otherwise denies any headaches, fever, cough, chest pain, shortness of breath, abdominal pain.  She states that she is having at least 4 episodes of diarrhea a day.  No vomiting.    She recently had a UTI and completed antibiotics but she states the diarrhea started before the antibiotics.    Patient is worried that she is having episodes of atrial fibrillation like she has had in the past.  She is on Eliquis.      CHART REVIEW:  Parts of an Office note by Dr. Mann from March 29, 2024 with the [facility]:  CC: Exertional shortness of breath, bronchiectasis, concern for MAHENDRA     HPI Adilia Eric is an 83 y.o. female with PMH of mild bronchiectasis, hypertension, paroxysmal atrial fibrillation on chronic anticoagulation, GERD, type 2 diabetes, remote history of possible TIA, intracranial hemorrhage 2011 who presents for consultation of exertional shortness of breath.      2/5/2024 ED for 1 week duration exertional shortness of breath, found to be in atrial fibrillation with RVR, improvement after cardioversion then.  Was also noted to be hypertensive 149/92 at that time but not hypoxic.  - on sotaolol as of 2/7/24 Cardiology appt     3/29/2024 Visit: Patient accompanied by her son and daughter-in-law who helps corroborate history.  Has had about 6-month duration of increased shortness of breath, not currently worse or better since rhythm control for A-fib.  Feels overwhelmed currently after having ED visit 3/22/2024 for diarrhea and subsequent UTI, just finishing up Keflex with some improvement.  CT abdomen pelvis at that time showed mild bronchiectasis.    ASSESSMENT/PLAN:  Adilia Eric is an 83 y.o. female with PMH of mild bronchiectasis, hypertension, paroxysmal atrial fibrillation on chronic anticoagulation, GERD, type 2 diabetes who presents for consultation of exertional shortness of breath, found to have mild bronchiectasis on 3/2024 imaging.      Exertional dyspnea: multifactorial given left heart disease (PAF fred) although normal BNP, consider airflow obstruction w/ underlying evidence of bronchiectasis, deconditioning  Mild bronchiectasis noted on 3/22/24 CT a/p FV     PFT, consider HRCT based on these results  Current on pulmonary vaccines     Return to clinic planned for: she will be notified of PFT and next steps     Heather Mann,   [facility]  ======================================      REVIEW OF SYSTEMS  Review of Systems   Constitutional:  Negative for fever.   Respiratory:  Negative for cough and shortness of breath.    Cardiovascular:  Positive for palpitations. Negative for chest pain.   Gastrointestinal:  Positive for diarrhea. Negative for abdominal pain, nausea and vomiting.   Genitourinary:  Negative for dysuria.   All other systems reviewed and are negative.        PAST MEDICAL HISTORY:  Past Medical History:   Diagnosis Date    Depression     Diabetes mellitus (H)     GERD (gastroesophageal reflux disease)     Hyperlipemia     Hypertension     Intracranial hemorrhage (H) 2011    may be amyloid angiopathy; has been stable.    Mastoiditis 2011    Osteoarthritis     TMJ (temporomandibular joint disorder)          PAST SURGICAL HISTORY:  Past Surgical History:   Procedure Laterality Date    CYST REMOVAL      tongue    HYSTERECTOMY  1996    OOPHORECTOMY Bilateral 1996         CURRENT MEDICATIONS:    Prior to Admission medications    Medication Sig Start Date End Date Taking? Authorizing Provider   acetaminophen (TYLENOL) 500 MG tablet Take 1,000 mg by mouth 2 times daily as needed 8/17/17   Provider, Historical   allopurinol (ZYLOPRIM) 100 MG  tablet Take 100 mg by mouth daily 4/16/19   Provider, Historical   amLODIPine (NORVASC) 2.5 MG tablet TAKE 1 TABLET BY MOUTH EVERY DAY 10/20/23   Guille Aguilar MD   atorvastatin (LIPITOR) 40 MG tablet [ATORVASTATIN (LIPITOR) 40 MG TABLET] Take 40 mg by mouth bedtime. 7/16/14   Provider, Historical   baclofen (LIORESAL) 10 MG tablet [BACLOFEN (LIORESAL) 10 MG TABLET] Take 10 mg by mouth at bedtime.  4/16/19   Provider, Historical   calcium carbonate (OS-MARCELO) 600 mg calcium (1,500 mg) tablet [CALCIUM CARBONATE (OS-MARCELO) 600 MG CALCIUM (1,500 MG) TABLET] Take 1 tablet by mouth daily.  4/16/19   Provider, Historical   celecoxib (CELEBREX) 200 MG capsule [CELECOXIB (CELEBREX) 200 MG CAPSULE] Take 200 mg by mouth daily. 4/16/19   Provider, Historical   CONTOUR NEXT TEST test strip 2 times daily 4/5/23   Reported, Patient   cyanocobalamin 1000 MCG tablet [CYANOCOBALAMIN 1000 MCG TABLET] Take 1,000 mcg by mouth daily. 4/16/19   Provider, Historical   diphenhydrAMINE (BENADRYL) 25 mg tablet [DIPHENHYDRAMINE (BENADRYL) 25 MG TABLET] Take 25 mg by mouth at bedtime. 6/3/19   Provider, Historical   ELIQUIS ANTICOAGULANT 5 MG tablet TAKE 1 TABLET BY MOUTH TWICE DAILY TO THIN BLOOD 4/12/23   Reported, Patient   furosemide (LASIX) 20 MG tablet Take 20 mg daily x 3 days 2/7/24   Tavia Maxwell APRN CNP   hydrOXYzine (ATARAX) 25 MG tablet TAKE 1 TABLET BY MOUTH UP TO 3 TIMES DAILY AS NEEDED FOR ANXIETY 6/13/22   Reported, Patient   lactobacillus combo no.11 15 billion cell CpSP Take 1 capsule by mouth daily 4/18/16   Provider, Historical   lisinopril (ZESTRIL) 20 MG tablet TAKE 1 TABLET BY MOUTH TWICE A DAY FOR BLOOD PRESSURE 3/5/23   Reported, Patient   LORazepam (ATIVAN) 0.5 MG tablet [LORAZEPAM (ATIVAN) 0.5 MG TABLET] Take 0.5 mg by mouth 2 (two) times a day as needed.  4/16/19   Provider, Historical   magnesium oxide (MAG-OX) 400 mg tablet [MAGNESIUM OXIDE (MAG-OX) 400 MG TABLET] Take 400 mg by mouth daily. 7/16/14    Provider, Historical   metFORMIN (GLUCOPHAGE-XR) 500 MG 24 hr tablet [METFORMIN (GLUCOPHAGE-XR) 500 MG 24 HR TABLET] TAKE 1 TABLET BY MOUTH EVERY DAY FOR DIABETES 2/21/19   Provider, Historical   Microlet Lancets MISC TEST BLOOD SUGAR 2 X'S DAILY DX: E11.9 1/20/23   Reported, Patient   sotalol (BETAPACE) 80 MG tablet Take 1 tablet (80 mg) by mouth 2 times daily 2/10/24   Tavia Maxwell APRN CNP         ALLERGIES:  Allergies   Allergen Reactions    Seasonal Allergies          FAMILY HISTORY:  Family History   Problem Relation Age of Onset    Heart Disease Father     No Known Problems Mother     No Known Problems Sister     No Known Problems Daughter     No Known Problems Maternal Grandmother     No Known Problems Maternal Grandfather     No Known Problems Paternal Grandmother     No Known Problems Paternal Grandfather     No Known Problems Maternal Aunt     No Known Problems Paternal Aunt     Hereditary Breast and Ovarian Cancer Syndrome No family hx of     Breast Cancer No family hx of     Cancer No family hx of     Colon Cancer No family hx of     Endometrial Cancer No family hx of     Ovarian Cancer No family hx of          SOCIAL HISTORY:  Social History     Socioeconomic History    Marital status:    Tobacco Use    Smoking status: Former    Smokeless tobacco: Never   Substance and Sexual Activity    Alcohol use: Yes     Alcohol/week: 1.7 standard drinks of alcohol    Drug use: No     Social Determinants of Health     Financial Resource Strain: Low Risk  (12/29/2023)    [facility], [facility]    Financial Resource Strain     Difficulty of Paying Living Expenses: 3   Food Insecurity: No Food Insecurity (12/29/2023)    Received from [facility], [employer] [facility]    Food Insecurity     Worried About Running Out of Food in the Last Year: 1   Transportation Needs: No  Transportation Needs (12/29/2023)    [facility] from [facility], [facility]    Transportation Needs     Lack of Transportation (Medical): 1   Social Connections: Socially Integrated (12/29/2023)    Received from [facility], [facility]    Social Connections     Frequency of Communication with Friends and Family: 0   Housing Stability: Low Risk  (12/29/2023)    Received from [facility], [facility]    Housing Stability     Unable to Pay for Housing in the Last Year: 1         VITALS:  Patient Vitals for the past 24 hrs:   BP Temp Temp src Pulse Resp SpO2 Height Weight   04/10/24 2336 -- -- -- 100 -- -- -- --   04/10/24 2317 -- -- -- (!) 121 -- -- -- --   04/10/24 2230 (!) 185/97 -- -- 98 -- 95 % -- --   04/10/24 2045 (!) 164/99 -- -- 93 20 93 % -- --   04/10/24 2019 (!) 178/114 98.1  F (36.7  C) Oral 101 16 94 % 1.524 m (5') 58.5 kg (129 lb)       Wt Readings from Last 3 Encounters:   04/10/24 58.5 kg (129 lb)   03/22/24 59 kg (130 lb)   02/12/24 59.9 kg (132 lb)       Estimated Creatinine Clearance: 59.9 mL/min (based on SCr of 0.57 mg/dL).    PHYSICAL EXAM    Constitutional:  Well developed, Well nourished, NAD  HENT:  Normocephalic, Atraumatic, Bilateral external ears normal, Nose normal. Neck- Supple, No stridor.   Eyes:  PERRL, EOMI, Conjunctiva normal, No discharge.  Respiratory:  Normal breath sounds, No respiratory distress, No wheezing, Speaks full sentences easily. No cough.   Cardiovascular:  Normal heart rate, Regular rhythm, No murmurs, No rubs, No gallops. Chest wall nontender.   GI:  No excessive obesity.  Bowel sounds normal, Soft, No tenderness, No masses, No flank tenderness. No rebound or guarding.   : deferred  Musculoskeletal:  No cyanosis, No clubbing. Good range of motion in all major joints. No  major deformities noted.   Integument:  Warm, Dry, No erythema, No rash.  No petechiae.   Neurologic:  Alert & oriented x 3  Psychiatric:  Affect normal, Cooperative         LAB:  All pertinent labs reviewed and interpreted.  Recent Results (from the past 24 hour(s))   Basic metabolic panel    Collection Time: 04/10/24  9:02 PM   Result Value Ref Range    Sodium 131 (L) 135 - 145 mmol/L    Potassium 3.7 3.4 - 5.3 mmol/L    Chloride 95 (L) 98 - 107 mmol/L    Carbon Dioxide (CO2) 25 22 - 29 mmol/L    Anion Gap 11 7 - 15 mmol/L    Urea Nitrogen 6.5 (L) 8.0 - 23.0 mg/dL    Creatinine 0.57 0.51 - 0.95 mg/dL    GFR Estimate 90 >60 mL/min/1.73m2    Calcium 10.0 8.8 - 10.2 mg/dL    Glucose 118 (H) 70 - 99 mg/dL   Magnesium    Collection Time: 04/10/24  9:02 PM   Result Value Ref Range    Magnesium 1.7 1.7 - 2.3 mg/dL   TSH with free T4 reflex    Collection Time: 04/10/24  9:02 PM   Result Value Ref Range    TSH 0.65 0.30 - 4.20 uIU/mL   Troponin T, High Sensitivity (now)    Collection Time: 04/10/24  9:02 PM   Result Value Ref Range    Troponin T, High Sensitivity 9 <=14 ng/L   Extra Blue Top Tube    Collection Time: 04/10/24  9:02 PM   Result Value Ref Range    Hold Specimen JIC    Extra Red Top Tube    Collection Time: 04/10/24  9:02 PM   Result Value Ref Range    Hold Specimen JI    Extra Blood Bank Purple Top Tube    Collection Time: 04/10/24  9:02 PM   Result Value Ref Range    Hold Specimen [facility]    CBC with platelets and differential    Collection Time: 04/10/24  9:02 PM   Result Value Ref Range    WBC Count 9.7 4.0 - 11.0 10e3/uL    RBC Count 5.01 3.80 - 5.20 10e6/uL    Hemoglobin 15.4 11.7 - 15.7 g/dL    Hematocrit 45.8 35.0 - 47.0 %    MCV 91 78 - 100 fL    MCH 30.7 26.5 - 33.0 pg    MCHC 33.6 31.5 - 36.5 g/dL    RDW 12.3 10.0 - 15.0 %    Platelet Count 262 150 - 450 10e3/uL    % Neutrophils 72 %    % Lymphocytes 14 %    % Monocytes 12 %    % Eosinophils 2 %    % Basophils 0 %    % Immature Granulocytes 0 %     "NRBCs per 100 WBC 0 <1 /100    Absolute Neutrophils 7.0 1.6 - 8.3 10e3/uL    Absolute Lymphocytes 1.3 0.8 - 5.3 10e3/uL    Absolute Monocytes 1.1 0.0 - 1.3 10e3/uL    Absolute Eosinophils 0.2 0.0 - 0.7 10e3/uL    Absolute Basophils 0.0 0.0 - 0.2 10e3/uL    Absolute Immature Granulocytes 0.0 <=0.4 10e3/uL    Absolute NRBCs 0.0 10e3/uL   Nt probnp inpatient    Collection Time: 04/10/24  9:02 PM   Result Value Ref Range    N terminal Pro BNP Inpatient 265 0 - 1,800 pg/mL   UA with Microscopic reflex to Culture    Collection Time: 04/10/24  9:42 PM    Specimen: Urine, Clean Catch   Result Value Ref Range    Color Urine Colorless Colorless, Straw, Light Yellow, Yellow    Appearance Urine Clear Clear    Glucose Urine Negative Negative mg/dL    Bilirubin Urine Negative Negative    Ketones Urine Negative Negative mg/dL    Specific Gravity Urine 1.005 1.001 - 1.030    Blood Urine Negative Negative    pH Urine 8.0 (H) 5.0 - 7.0    Protein Albumin Urine Negative Negative mg/dL    Urobilinogen Urine <2.0 <2.0 mg/dL    Nitrite Urine Negative Negative    Leukocyte Esterase Urine 25 Rylie/uL (A) Negative    Bacteria Urine Few (A) None Seen /HPF    RBC Urine 1 <=2 /HPF    WBC Urine 5 <=5 /HPF    Squamous Epithelials Urine <1 <=1 /HPF   Lactic acid whole blood    Collection Time: 04/10/24  9:47 PM   Result Value Ref Range    Lactic Acid 0.7 0.7 - 2.0 mmol/L   Troponin T, High Sensitivity (now)    Collection Time: 04/10/24 11:16 PM   Result Value Ref Range    Troponin T, High Sensitivity 9 <=14 ng/L       No results found for: \"ABORH\"        RADIOLOGY:  Reviewed all pertinent imaging. Please see official radiology report.    CT Abdomen Pelvis w Contrast   Final Result   IMPRESSION:   1.  Negative for pulmonary embolism.      2.  Emphysema.      3.  Moderate coronary artery disease.      4.  Diffusely fluid-filled GI tract which could reflect a gastroenteritis. Distal colonic diverticulosis with suspected mild diverticulitis in the " distal descending colon.      5.  3.3 cm abdominal aortic aneurysm.      REFERENCE:   [facility]S practice guidelines on the care of patients with an abdominal aortic aneurysm. Clyde EL. J Vasc Surg, 2018. PMID: [id number].      Aorta size: 3.0 cm to 3.9 cm: Surveillance imaging at 3-year intervals.      CT Chest Pulmonary Embolism w Contrast   Final Result   IMPRESSION:   1.  Negative for pulmonary embolism.      2.  Emphysema.      3.  Moderate coronary artery disease.      4.  Diffusely fluid-filled GI tract which could reflect a gastroenteritis. Distal colonic diverticulosis with suspected mild diverticulitis in the distal descending colon.      5.  3.3 cm abdominal aortic aneurysm.      REFERENCE:   SVS practice guidelines on the care of patients with an abdominal aortic aneurysm. Clyde EL. J Vasc Surg, 2018. PMID: [id number].      Aorta size: 3.0 cm to 3.9 cm: Surveillance imaging at 3-year intervals.            EKG:    Indication: Palpitations    Performed at: 20:20p  Impression: Sinus rhythm at 98 bpm.  Flipped T waves noted lead aVR and V1.  AK interval 154 ms, QRS 70 ms, QTc 460 ms.  No signs of atrial fibrillation.  No signs for delta waves or Brugada.  No ST elevation.  Overall unremarkable EKG.  Specific ST changes compared to EKG from February 25, 2024.    I have independently reviewed and interpreted the EKG(s) documented above.    PROCEDURES:  none    Medical Decision Making  Obtained supplemental history:Supplemental history obtained?: Documented in chart and Family Member/Significant Other  Reviewed external records: External records reviewed?: Documented in chart and Outpatient Record: see HPI  Care impacted by chronic illness:Diabetes, Hypertension, and Other: anxiety  Care significantly affected by social determinants of health:N/A  Did you consider but not order tests?: Work up considered but not performed and documented in chart, if applicable  Did you interpret images independently?: Independent  interpretation of ECG and images noted in documentation, when applicable.  Consultation discussion with other provider:Did you involve another provider (consultant, , pharmacy, etc.)?: I discussed the care with another health care provider, see documentation for details.  Admit.      Azalea Cesar M.D. [facility]  Emergency Medicine and Medical Toxicology  [facility] EMERGENCY DEPARTMENT  [address] [place] [id number] [phone]  Dept: [phone]           Azalea Cesar MD  04/10/24 [id number]

## 2024-04-11 NOTE — ED NOTES
Pt states she had a bladder infection 3 weeks ago. Finished abxs one weeks ago. Has had diarrhea x1 month.

## 2024-04-11 NOTE — PROGRESS NOTES
Met with patient  to review role of care management, progression of care and possible need for services at discharge, including OP services, home care, or skilled nursing care. Patient alert, oriented and engaged in the conversation.     Assessed, lives alone and no svcs, independent w/cares and family supportive and can transport. Discussed NICHOLAS. No CM needs anticipated..

## 2024-04-11 NOTE — PROGRESS NOTES
GI status stable.  Suspect mild benzo withdrawal so will give a half dose lorazepam this morning and observe.  GI symptoms improving.  Increase activity. Likely can discharge today if she can tolerate clears and diarrhea is improved.

## 2024-04-11 NOTE — PLAN OF CARE
Problem: Adult Inpatient Plan of Care  Goal: Optimal Comfort and Wellbeing  Outcome: Progressing     Problem: Adult Inpatient Plan of Care  Goal: Readiness for Transition of Care  Intervention: Mutually Develop Transition Plan  Recent Flowsheet Documentation  Taken 4/10/2024 2019 by Bernarda Cisneros RN  Equipment Currently Used at Home:   cane, straight   walker, rolling   Goal Outcome Evaluation:         Problem: Adult Inpatient Plan of Care  Goal: Absence of Hospital-Acquired Illness or Injury  Intervention: Identify and Manage Fall Risk  Recent Flowsheet Documentation  Taken 4/11/2024 0600 by Bernarda Cisneros RN  Safety Promotion/Fall Prevention:   activity supervised   mobility aid in reach   lighting adjusted   nonskid shoes/slippers when out of bed  Intervention: Prevent Skin Injury  Recent Flowsheet Documentation  Taken 4/11/2024 0600 by Bernarda Cisneros RN  Body Position: position changed independently  Intervention: Prevent Infection  Recent Flowsheet Documentation  Taken 4/11/2024 0600 by Bernarda Cisneros RN  Infection Prevention: rest/sleep promoted  Goal: Readiness for Transition of Care  Intervention: Mutually Develop Transition Plan  Recent Flowsheet Documentation  Taken 4/10/2024 2019 by Bernarda Cisneros, MICHAEL  Equipment Currently Used at Home:   cane, straight   walker, rolling     Pt is A&OX4, Hrt 121 tachy sinus rhythm with any walking or exertion. On RA . Tele sinus tachy w/ occasional PAC and PVC. /98. BG =126. Gave IV Cipro. L-PIV infusing 75ml/hr Comb Low NA and Sat fat diet.SBA. Pharmacy needs to review pt's home meds.

## 2024-04-11 NOTE — H&P
[facility]    History and Physical - Hospitalist Service       Date of Admission:  4/10/2024    Assessment & Plan      Adilia Eric is a 83 year old female PMH anxiety, atrial fibrillation, HTN, HFpEF presented with tachycardia and lightheadedness. Patient admitted on 4/10/2024 for tachycardia 2/2 dehydration due to ongoing diarrhea    # Diarrhea  # Mild diverticulitis  - Presented with tachycardia and lightheadedness, states has ongoing diarrhea for more than 4 weeks.  Recently treated for UTI, but patient states that she had diarrhea ongoing prior to antibiotics for UTI  - Has 8-10 episodes of watery diarrhea every day, denies abdominal pain  - TSH 0.65  - CBC unremarkable, LA normal  - BNP, Troponin normal  - C.diff negative, Enteric panel negative  - CT chest abdomen pelvis: Negative for PE  Diffusely fluid-filled GI tract which could reflect gastroenteritis.  Distal colonic diverticulosis with suspected mild diverticulitis in the distal descending colon  - Continue Cipro, Flagyl  - gentle IV fluids for dehydration, received 500 mL bolus in ER    # Sinus tachycardia, suspect secondary to dehydration  BNP, troponin x 2 normal  EKG Sinus tachycardia  Telemetry monitoring  IV fluids as above    # Hyponatremia likely 2/2 hypovolemia  Hypochloremia  Na 131  IV fluids as above  Monitor BMP am    # HTN  Amlodipine 2.5mg  Resume home medication after med rec if BP elevated    # DM2  HbA1c  ILSS medium    # Paroxysmal A-fib  PTA Sotalol, Eliquis    # Anxiety  Hydroxyzine prn    # Abdominal aortic aneurysm 3.3 cm  Surveillance imaging at 3-year interval       Observation Goals: -diagnostic tests and consults completed and resulted, -vital signs normal or at patient baseline, Nurse to notify provider when observation goals have been met and patient is ready for discharge.  Diet: Combination Diet Low Saturated Fat Na <2400mg Diet  DVT Prophylaxis: Enoxaparin (Lovenox) SQ  Penaloza Catheter: Not  present  Lines: None     Cardiac Monitoring: ACTIVE order. Indication: Tachyarrhythmias, acute (48 hours)  Code Status: Full Code    Clinically Significant Risk Factors Present on Admission               # Drug Induced Coagulation Defect: home medication list includes an anticoagulant medication    # Hypertension: Noted on problem list      # Overweight: Estimated body mass index is 25.19 kg/m  as calculated from the following:    Height as of this encounter: 1.524 m (5').    Weight as of this encounter: 58.5 kg (129 lb).              Disposition Plan     Medically Ready for Discharge: Anticipated in 2-4 Days           Gianna Kaur MD  Hospitalist Service  [facility]  Securely message with [employer] | [employer] (more info)  Text page via [facility] Paging/Directory     ______________________________________________________________________    Chief Complaint   Tachycardia and ongoing diarrhea    History is obtained from the patient    History of Present Illness   Adilia Eric is a 83 year old female PMH anxiety, atrial fibrillation, HTN, HFpEF presented with tachycardia and lightheadedness. Patient admitted on 4/10/2024 for tachycardia 2/2 dehydration due to ongoing diarrhea. Patient states that she recently got up to use the bathroom and noted that she was tachycardic and felt her heart racing and lightheaded.  She has ongoing diarrhea since 4 weeks, watery has 8-10 episodes every day.  States that diarrhea started prior to starting antibiotics for UTI 3 weeks ago.  Has abdominal pain, fever.  Imaging shows mild diverticulitis, started on antibiotics.  Will send testing for enteric panel, stool culture, C. Difficile.  Gentle IV hydration for dehydration      Past Medical History    Past Medical History:   Diagnosis Date    Depression     Diabetes mellitus (H)     GERD (gastroesophageal reflux disease)     Hyperlipemia     Hypertension     Intracranial hemorrhage (H) 2011    may be amyloid angiopathy; has  been stable.    Mastoiditis     Osteoarthritis     TMJ (temporomandibular joint disorder)        Past Surgical History   Past Surgical History:   Procedure Laterality Date    CYST REMOVAL      tongue    HYSTERECTOMY      OOPHORECTOMY Bilateral        Prior to Admission Medications   Prior to Admission Medications   Prescriptions Last Dose Informant Patient Reported? Taking?   CONTOUR NEXT TEST test strip   Yes No   Si times daily   ELIQUIS ANTICOAGULANT 5 MG tablet   Yes No   Sig: TAKE 1 TABLET BY MOUTH TWICE DAILY TO THIN BLOOD   LORazepam (ATIVAN) 0.5 MG tablet   Yes No   Sig: [LORAZEPAM (ATIVAN) 0.5 MG TABLET] Take 0.5 mg by mouth 2 (two) times a day as needed.    Microlet Lancets MISC   Yes No   Sig: TEST BLOOD SUGAR 2 X'S DAILY DX: E11.9   acetaminophen (TYLENOL) 500 MG tablet   Yes No   Sig: Take 1,000 mg by mouth 2 times daily as needed   allopurinol (ZYLOPRIM) 100 MG tablet   Yes No   Sig: Take 100 mg by mouth daily   amLODIPine (NORVASC) 2.5 MG tablet   No No   Sig: TAKE 1 TABLET BY MOUTH EVERY DAY   atorvastatin (LIPITOR) 40 MG tablet   Yes No   Sig: [ATORVASTATIN (LIPITOR) 40 MG TABLET] Take 40 mg by mouth bedtime.   baclofen (LIORESAL) 10 MG tablet   Yes No   Sig: [BACLOFEN (LIORESAL) 10 MG TABLET] Take 10 mg by mouth at bedtime.    calcium carbonate (OS-MARCELO) 600 mg calcium (1,500 mg) tablet   Yes No   Sig: [CALCIUM CARBONATE (OS-MARCELO) 600 MG CALCIUM (1,500 MG) TABLET] Take 1 tablet by mouth daily.    celecoxib (CELEBREX) 200 MG capsule   Yes No   Sig: [CELECOXIB (CELEBREX) 200 MG CAPSULE] Take 200 mg by mouth daily.   cyanocobalamin 1000 MCG tablet   Yes No   Sig: [CYANOCOBALAMIN 1000 MCG TABLET] Take 1,000 mcg by mouth daily.   diphenhydrAMINE (BENADRYL) 25 mg tablet   Yes No   Sig: [DIPHENHYDRAMINE (BENADRYL) 25 MG TABLET] Take 25 mg by mouth at bedtime.   furosemide (LASIX) 20 MG tablet   No No   Sig: Take 20 mg daily x 3 days   hydrOXYzine (ATARAX) 25 MG tablet   Yes No   Sig: TAKE 1  TABLET BY MOUTH UP TO 3 TIMES DAILY AS NEEDED FOR ANXIETY   lactobacillus combo no.11 15 billion cell CpSP   Yes No   Sig: Take 1 capsule by mouth daily   lisinopril (ZESTRIL) 20 MG tablet   Yes No   Sig: TAKE 1 TABLET BY MOUTH TWICE A DAY FOR BLOOD PRESSURE   magnesium oxide (MAG-OX) 400 mg tablet   Yes No   Sig: [MAGNESIUM OXIDE (MAG-OX) 400 MG TABLET] Take 400 mg by mouth daily.   metFORMIN (GLUCOPHAGE-XR) 500 MG 24 hr tablet   Yes No   Sig: [METFORMIN (GLUCOPHAGE-XR) 500 MG 24 HR TABLET] TAKE 1 TABLET BY MOUTH EVERY DAY FOR DIABETES   sotalol (BETAPACE) 80 MG tablet   No No   Sig: Take 1 tablet (80 mg) by mouth 2 times daily      Facility-Administered Medications: None        Review of Systems    The 10 point Review of Systems is negative other than noted in the HPI or here.      Physical Exam   Vital Signs: Temp: 98.1  F (36.7  C) Temp src: Oral BP: (!) 160/94 Pulse: 100   Resp: 20 SpO2: 92 % O2 Device: None (Room air)    Weight: 129 lbs 0 oz    General:  Appears stated age, no acute distress. A&O x 3.  Skin:  Warm, dry. Poor skin tugor  Chest:  Breath sounds CTA and no increased work of breathing on room air.No rales or wheezes  Cardiovascular:  RRR, no rub or murmur. No peripheral edema.  Abdomen:  Soft, non-tender, non-distended.  Musculoskeletal:  Moves all four extremities. No muscle atrophy.  Neurological:  No gross focal deficits    Medical Decision Making       75 MINUTES SPENT BY ME on the date of service doing chart review, history, exam, documentation & further activities per the note.      Data     I have personally reviewed the following data over the past 24 hrs:    9.7  \   15.4   / 262     131 (L) 95 (L) 6.5 (L) /  118 (H)   3.7 25 0.57 \     Trop: 9 BNP: 265     TSH: 0.65 T4: N/A A1C: N/A     Procal: N/A CRP: N/A Lactic Acid: 0.7         Imaging results reviewed over the past 24 hrs:   Recent Results (from the past 24 hour(s))   CT Chest Pulmonary Embolism w Contrast    Narrative    EXAM: CT  CHEST PULMONARY EMBOLISM W CONTRAST, CT ABDOMEN PELVIS W CONTRAST  LOCATION: [facility]  DATE: 4/10/2024    INDICATION: tachycardia and hypoxia  COMPARISON: Chest radiograph from 02/25/2024. Chest CT from 05/05/2022. Abdominal CT from 03/22/2024.  TECHNIQUE: CT chest pulmonary angiogram during arterial phase injection of IV contrast. Multiplanar reformats and MIP reconstructions were performed. Dose reduction techniques were used.   CONTRAST: 90 mL Isovue 370    FINDINGS:  ANGIOGRAM CHEST: Pulmonary arteries are normal caliber and negative for pulmonary emboli. Thoracic aorta is not well opacified and is  indeterminate for dissection. No CT evidence of right heart strain.    LUNGS AND PLEURA: Centrilobular emphysematous changes. Mild right lower lobe bronchial wall thickening. No acute airspace infiltrate. No pneumothorax or pleural effusion.    MEDIASTINUM/AXILLAE: Heart size within normal limits. No pericardial effusion.    CORONARY ARTERY CALCIFICATION: Moderate.    HEPATOBILIARY: Normal.    PANCREAS: Normal.    SPLEEN: Normal.    ADRENAL GLANDS: Normal.    KIDNEYS/BLADDER: Normal.    BOWEL: Diffusely fluid-filled GI tract. Distal colonic diverticulosis. Equivocal, mild pericolonic edema in the deep pelvis in the region of the redundant distal sigmoid colon. No free air.    LYMPH NODES: Normal.    VASCULATURE: Atherosclerotic abdominal aorta with saccular aneurysmal dilatation of the infrarenal portion measuring up to 3.3 cm in diameter.    PELVIC ORGANS: Absent uterus.    MUSCULOSKELETAL: Osteopenia with thoracolumbar spine degenerative disc changes. Right hip arthroplasty. Moderate left hip arthritis.      Impression    IMPRESSION:  1.  Negative for pulmonary embolism.    2.  Emphysema.    3.  Moderate coronary artery disease.    4.  Diffusely fluid-filled GI tract which could reflect a gastroenteritis. Distal colonic diverticulosis with suspected mild diverticulitis in the distal  descending colon.    5.  3.3 cm abdominal aortic aneurysm.    REFERENCE:  SVS practice guidelines on the care of patients with an abdominal aortic aneurysm. Clyde LEAL. J Vasc Surg, 2018. PMID: [id number].    Aorta size: 3.0 cm to 3.9 cm: Surveillance imaging at 3-year intervals.   CT Abdomen Pelvis w Contrast    Narrative    EXAM: CT CHEST PULMONARY EMBOLISM W CONTRAST, CT ABDOMEN PELVIS W CONTRAST  LOCATION: [facility]  DATE: 4/10/2024    INDICATION: tachycardia and hypoxia  COMPARISON: Chest radiograph from 02/25/2024. Chest CT from 05/05/2022. Abdominal CT from 03/22/2024.  TECHNIQUE: CT chest pulmonary angiogram during arterial phase injection of IV contrast. Multiplanar reformats and MIP reconstructions were performed. Dose reduction techniques were used.   CONTRAST: 90 mL Isovue 370    FINDINGS:  ANGIOGRAM CHEST: Pulmonary arteries are normal caliber and negative for pulmonary emboli. Thoracic aorta is not well opacified and is  indeterminate for dissection. No CT evidence of right heart strain.    LUNGS AND PLEURA: Centrilobular emphysematous changes. Mild right lower lobe bronchial wall thickening. No acute airspace infiltrate. No pneumothorax or pleural effusion.    MEDIASTINUM/AXILLAE: Heart size within normal limits. No pericardial effusion.    CORONARY ARTERY CALCIFICATION: Moderate.    HEPATOBILIARY: Normal.    PANCREAS: Normal.    SPLEEN: Normal.    ADRENAL GLANDS: Normal.    KIDNEYS/BLADDER: Normal.    BOWEL: Diffusely fluid-filled GI tract. Distal colonic diverticulosis. Equivocal, mild pericolonic edema in the deep pelvis in the region of the redundant distal sigmoid colon. No free air.    LYMPH NODES: Normal.    VASCULATURE: Atherosclerotic abdominal aorta with saccular aneurysmal dilatation of the infrarenal portion measuring up to 3.3 cm in diameter.    PELVIC ORGANS: Absent uterus.    MUSCULOSKELETAL: Osteopenia with thoracolumbar spine degenerative disc changes. Right  hip arthroplasty. Moderate left hip arthritis.      Impression    IMPRESSION:  1.  Negative for pulmonary embolism.    2.  Emphysema.    3.  Moderate coronary artery disease.    4.  Diffusely fluid-filled GI tract which could reflect a gastroenteritis. Distal colonic diverticulosis with suspected mild diverticulitis in the distal descending colon.    5.  3.3 cm abdominal aortic aneurysm.    REFERENCE:  SVS practice guidelines on the care of patients with an abdominal aortic aneurysm. Clyde LEAL. J Vasc Surg, 2018. PMID: [id number].    Aorta size: 3.0 cm to 3.9 cm: Surveillance imaging at 3-year intervals.

## 2024-04-11 NOTE — PHARMACY-ADMISSION MEDICATION HISTORY
Pharmacist Admission Medication History    Admission medication history is complete. The information provided in this note is only as accurate as the sources available at the time of the update.    Information Source(s): Patient and CareEverywhere/SureScripts via in-person    Pertinent Information: None    Changes made to PTA medication list:  Added: Sertraline, alendronate  Deleted: Benadryl, furosemide  Changed: None    Allergies reviewed with patient and updates made in EHR: no    Medication History Completed By: Candace Houston [facility] 4/11/2024 8:23 AM    PTA Med List   Medication Sig Last Dose    acetaminophen (TYLENOL) 500 MG tablet Take 1,000 mg by mouth 2 times daily as needed Past Month    alendronate (FOSAMAX) 70 MG tablet Take 70 mg by mouth every 7 days Past Month    allopurinol (ZYLOPRIM) 100 MG tablet Take 100 mg by mouth daily 4/10/2024    amLODIPine (NORVASC) 2.5 MG tablet TAKE 1 TABLET BY MOUTH EVERY DAY 4/10/2024    atorvastatin (LIPITOR) 40 MG tablet [ATORVASTATIN (LIPITOR) 40 MG TABLET] Take 40 mg by mouth bedtime. 4/10/2024    baclofen (LIORESAL) 10 MG tablet [BACLOFEN (LIORESAL) 10 MG TABLET] Take 10 mg by mouth at bedtime.  4/10/2024    calcium carbonate (OS-MARCELO) 600 mg calcium (1,500 mg) tablet [CALCIUM CARBONATE (OS-MARCELO) 600 MG CALCIUM (1,500 MG) TABLET] Take 1 tablet by mouth daily.  4/10/2024    celecoxib (CELEBREX) 200 MG capsule [CELECOXIB (CELEBREX) 200 MG CAPSULE] Take 200 mg by mouth daily. 4/10/2024    cetirizine (ZYRTEC) 10 MG tablet Take 10 mg by mouth daily 4/10/2024    cyanocobalamin 1000 MCG tablet [CYANOCOBALAMIN 1000 MCG TABLET] Take 1,000 mcg by mouth daily. 4/10/2024    ELIQUIS ANTICOAGULANT 5 MG tablet TAKE 1 TABLET BY MOUTH TWICE DAILY TO THIN BLOOD 4/10/2024 at am    hydrOXYzine (ATARAX) 25 MG tablet TAKE 1 TABLET BY MOUTH UP TO 3 TIMES DAILY AS NEEDED FOR ANXIETY Past Month    lactobacillus combo no.11 15 billion cell CpSP Take 1 capsule by mouth daily 4/10/2024     lisinopril (ZESTRIL) 20 MG tablet TAKE 1 TABLET BY MOUTH TWICE A DAY FOR BLOOD PRESSURE 4/10/2024 at am    LORazepam (ATIVAN) 0.5 MG tablet [LORAZEPAM (ATIVAN) 0.5 MG TABLET] Take 0.5 mg by mouth 2 (two) times a day as needed.  Past Month    magnesium oxide (MAG-OX) 400 mg tablet [MAGNESIUM OXIDE (MAG-OX) 400 MG TABLET] Take 400 mg by mouth daily. 4/10/2024    metFORMIN (GLUCOPHAGE-XR) 500 MG 24 hr tablet [METFORMIN (GLUCOPHAGE-XR) 500 MG 24 HR TABLET] TAKE 1 TABLET BY MOUTH EVERY DAY FOR DIABETES 4/10/2024    sertraline (ZOLOFT) 25 MG tablet Take 25 mg by mouth daily 4/10/2024    sotalol (BETAPACE) 80 MG tablet Take 1 tablet (80 mg) by mouth 2 times daily 4/10/2024 at am

## 2024-04-11 NOTE — ED NOTES
Pt ambulated around the room with monitor on. Pt's HR increased to 130 and pt desated down to 88% RA. When pt got back into bed pt's HR came down and her O2 level went back up to 91% RA. Provider notified.

## 2024-04-11 NOTE — PLAN OF CARE
Goal Outcome Evaluation:      Plan of Care Reviewed With: patient    Overall Patient Progress: improving    Patient discharged to home  via Private Car.  Accompanied by son and daughter and staff.  Discharge instructions were reviewed with patient, opportunity offered to ask questions.    Prescriptions e-prescribed to pharmacy.  Access discontinued: Yes  Care plan and education discontinued: Yes  Belongings were sent home with patient/family:  Cell phone/electronics:  , Clothing: Shirt(s):  , Pants:  , Underclothes:  , and Outerwear:  , and Shoes:   .

## 2024-04-11 NOTE — ED NOTES
[facility] ED Handoff Report    ED Chief Complaint: Rapid HR, Shortness of Breath, Diarrhea    ED Diagnosis:  (R00.0) Sinus tachycardia  Comment:   Plan:     (I71.40) Abdominal aortic aneurysm (AAA) without rupture, unspecified part (H24)  Comment:   Plan:     (K57.32) Diverticulitis of colon  Comment:   Plan:       PMH:    Past Medical History:   Diagnosis Date    Depression     Diabetes mellitus (H)     GERD (gastroesophageal reflux disease)     Hyperlipemia     Hypertension     Intracranial hemorrhage (H) 2011    may be amyloid angiopathy; has been stable.    Mastoiditis 2011    Osteoarthritis     TMJ (temporomandibular joint disorder)         Code Status:  No Order     Falls Risk: Yes Band: Applied    Current Living Situation/Residence: lives alone     Elimination Status: Continent: Yes     Activity Level: SBA    Patients Preferred Language:  English     Needed: No    Vital Signs:  BP (!) 160/94   Pulse 100   Temp 98.1  F (36.7  C) (Oral)   Resp 20   Ht 1.524 m (5')   Wt 58.5 kg (129 lb)   SpO2 92%   BMI 25.19 kg/m       Cardiac Rhythm: Normal Sinus Tachycardia    Pain Score: 0/10    Is the Patient Confused:  No    Last Food or Drink: 04/11/24 water around 0030.    Focused Assessment:  Pt complains of her heart racing with movement, her HR increases to 120. Pt also feels short of breath upon exertion, pt does desat while standing and walking but recovers quickly when she lays back down. Pt is having frequent diarrhea. A&O x4.    Tests Performed: Done: Labs and Imaging    Treatments Provided:  IV antibiotics, IV fluids.    Family Dynamics/Concerns: No    Family Updated On Visitor Policy: Yes    Plan of Care Communicated to Family: Yes    Who Was Updated about Plan of Care: Daughter in law, Sarah.    Belongings Checklist Done and Signed by Patient: No    Medications sent with patient: N/A.        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.    RN: Mary Ann Robert RN    4/11/2024 12:33 AM

## 2024-04-11 NOTE — PROGRESS NOTES
PRIMARY DIAGNOSIS: Sinus Tachycardia  OUTPATIENT/OBSERVATION GOALS TO BE MET BEFORE DISCHARGE:  1. Ruled out acute coronary syndrome (negative or stable Troponin):  Yes  2. Pain Status: Pain free.  3. Appropriate provocative testing performed: Yes  - Stress Test Procedure: Last Trop was 9  - Interpretation of cardiac rhythm per telemetry tech: Sinus Tach    4. Cleared by Consultants (if applicable):No  5. Return to near baseline physical activity: No  Discharge Planner Nurse   Safe discharge environment identified: No  Barriers to discharge: Yes       Entered by: Dorothy Dow RN 04/11/2024 2:14 PM     Patient alert and oriented x4. Tele is Sinus Tach. NS running at 75ml/hr. Last blood sugar was 164. Blood pressure was 141/76 - a significant improvement from this am. Patient was ambulated in the quick. Patient remains on contact precautions. Patient does not complain of chest pain at present.

## 2024-04-11 NOTE — ED TRIAGE NOTES
"Pt presents from home with [facility]. Called EMS d/t \"heart racing\". Hx afib, anxiety. Sinus tach, no chest pain. Takes Ativan and Zoloft at home. . /116.     Triage Assessment (Adult)       Row Name 04/10/24 2021          Triage Assessment    Airway WDL WDL        Respiratory WDL    Respiratory WDL WDL        Cardiac WDL    Cardiac WDL X     Cardiac Rhythm NSR        Cognitive/Neuro/Behavioral WDL    Cognitive/Neuro/Behavioral WDL WDL                     "

## 2024-04-11 NOTE — DISCHARGE SUMMARY
[facility]  Hospitalist Discharge Summary      Date of Admission:  4/10/2024  Date of Discharge:  4/11/2024  Discharging Provider: Suleman Quintanilla MD  Discharge Service: Hospitalist Service    Discharge Diagnoses   Diverticulitis    Clinically Significant Risk Factors     # Overweight: Estimated body mass index is 25.19 kg/m  as calculated from the following:    Height as of this encounter: 1.524 m (5').    Weight as of this encounter: 58.5 kg (129 lb).       Follow-ups Needed After Discharge   Follow-up Appointments     Follow-up and recommended labs and tests       Follow up with primary care provider, Malcolm Carrington, within 7 days for   hospital follow- up.  The following labs/tests are recommended: Basic   Metabolic Profile and Complete Blood Count.            Unresulted Labs Ordered in the Past 30 Days of this Admission       No orders found for last 31 day(s).        These results will be followed up by n/a    Discharge Disposition   Discharged to home  Condition at discharge: Stable    Hospital Course      Adilia Eric is a 83 year old female PMH anxiety, atrial fibrillation, HTN, HFpEF presented with tachycardia and lightheadedness. Patient admitted on 4/10/2024 for tachycardia 2/2 dehydration due to ongoing diarrhea.  QT was WNL.  She was found to have diverticulitis and improved quickly on cipro/flagyl.  She is currently tolerating PO and is ready to discharge home.  It was noted that dose-reducing her Eliquis was done based on review with pharmacy.    Further details below:    # Diarrhea  # Mild diverticulitis  - Presented with tachycardia and lightheadedness, states has ongoing diarrhea for more than 4 weeks.  Recently treated for UTI, but patient states that she had diarrhea ongoing prior to antibiotics for UTI  - Has 8-10 episodes of watery diarrhea every day, denies abdominal pain  - TSH 0.65  - CBC unremarkable, LA normal  - BNP, Troponin normal  - C.diff negative,  Enteric panel negative  - CT chest abdomen pelvis: Negative for PE  Diffusely fluid-filled GI tract which could reflect gastroenteritis.  Distal colonic diverticulosis with suspected mild diverticulitis in the distal descending colon  - Complete Cipro, Flagyl      # Sinus tachycardia, suspect secondary to dehydration  BNP, troponin x 2 normal  EKG Sinus tachycardia  - Resolved    # Hyponatremia likely 2/2 hypovolemia  Hypochloremia  - Resolved with IVF    # HTN  Amlodipine 2.5mg  Resume home medication     # DM2  - stable    # Paroxysmal A-fib  PTA Sotalol, Eliquis at reduced dose    # Anxiety  Hydroxyzine prn    # Abdominal aortic aneurysm 3.3 cm  Surveillance imaging at 3-year interval    Consultations This Hospital Stay   None    Code Status   Full Code    Time Spent on this Encounter   I, Suleman Quintanilla MD, personally saw the patient today and spent greater than 30 minutes discharging this patient.       Suleman Quintanilla MD  [facility]TEND[facility]OV[facility] AND SHORT STAY  [address] [place] [id number]  Phone: [phone]  Fax: [phone]  ______________________________________________________________________    Physical Exam   Vital Signs: Temp: 98.1  F (36.7  C) Temp src: Oral BP: (!) 164/92 Pulse: 86   Resp: 17 SpO2: (!) 82 % O2 Device: None (Room air)    Weight: 129 lbs 0 oz  Constitutional: awake, alert, cooperative, no apparent distress, and appears stated age  Respiratory: No increased work of breathing, good air exchange, clear to auscultation bilaterally, no crackles or wheezing  Cardiovascular: normal S1 and S2  GI: normal bowel sounds, non-distended, and non-tender  Neurologic: Cranial Nerves:  cranial nerves II-XII are grossly intact  Motor Exam:  moves all extremities well and symmetrically       Primary Care Physician   Malcolm Carrington    Discharge Orders      Reason for your hospital stay    Divirticulitis     Follow-up and recommended labs and tests      Follow up with primary care provider, Malcolm Carrington, within 7 days for hospital follow- up.  The following labs/tests are recommended: Basic Metabolic Profile and Complete Blood Count.     Activity    Your activity upon discharge: activity as tolerated     Diet    Follow this diet upon discharge: [place] diet for now, advance slowly as tolerated.       Significant Results and Procedures   Results for orders placed or performed during the hospital encounter of 04/10/24   CT Chest Pulmonary Embolism w Contrast    Narrative    EXAM: CT CHEST PULMONARY EMBOLISM W CONTRAST, CT ABDOMEN PELVIS W CONTRAST  LOCATION: [facility]  DATE: 4/10/2024    INDICATION: tachycardia and hypoxia  COMPARISON: Chest radiograph from 02/25/2024. Chest CT from 05/05/2022. Abdominal CT from 03/22/2024.  TECHNIQUE: CT chest pulmonary angiogram during arterial phase injection of IV contrast. Multiplanar reformats and MIP reconstructions were performed. Dose reduction techniques were used.   CONTRAST: 90 mL Isovue 370    FINDINGS:  ANGIOGRAM CHEST: Pulmonary arteries are normal caliber and negative for pulmonary emboli. Thoracic aorta is not well opacified and is  indeterminate for dissection. No CT evidence of right heart strain.    LUNGS AND PLEURA: Centrilobular emphysematous changes. Mild right lower lobe bronchial wall thickening. No acute airspace infiltrate. No pneumothorax or pleural effusion.    MEDIASTINUM/AXILLAE: Heart size within normal limits. No pericardial effusion.    CORONARY ARTERY CALCIFICATION: Moderate.    HEPATOBILIARY: Normal.    PANCREAS: Normal.    SPLEEN: Normal.    ADRENAL GLANDS: Normal.    KIDNEYS/BLADDER: Normal.    BOWEL: Diffusely fluid-filled GI tract. Distal colonic diverticulosis. Equivocal, mild pericolonic edema in the deep pelvis in the region of the redundant distal sigmoid colon. No free air.    LYMPH NODES: Normal.    VASCULATURE: Atherosclerotic abdominal aorta with saccular  aneurysmal dilatation of the infrarenal portion measuring up to 3.3 cm in diameter.    PELVIC ORGANS: Absent uterus.    MUSCULOSKELETAL: Osteopenia with thoracolumbar spine degenerative disc changes. Right hip arthroplasty. Moderate left hip arthritis.      Impression    IMPRESSION:  1.  Negative for pulmonary embolism.    2.  Emphysema.    3.  Moderate coronary artery disease.    4.  Diffusely fluid-filled GI tract which could reflect a gastroenteritis. Distal colonic diverticulosis with suspected mild diverticulitis in the distal descending colon.    5.  3.3 cm abdominal aortic aneurysm.    REFERENCE:  [facility] practice guidelines on the care of patients with an abdominal aortic aneurysm. Clyde LEAL. J Vasc Surg, 2018. PMID: 14469972.    Aorta size: 3.0 cm to 3.9 cm: Surveillance imaging at 3-year intervals.   CT Abdomen Pelvis w Contrast    Narrative    EXAM: CT CHEST PULMONARY EMBOLISM W CONTRAST, CT ABDOMEN PELVIS W CONTRAST  LOCATION: [facility]  DATE: 4/10/2024    INDICATION: tachycardia and hypoxia  COMPARISON: Chest radiograph from 02/25/2024. Chest CT from 05/05/2022. Abdominal CT from 03/22/2024.  TECHNIQUE: CT chest pulmonary angiogram during arterial phase injection of IV contrast. Multiplanar reformats and MIP reconstructions were performed. Dose reduction techniques were used.   CONTRAST: 90 mL Isovue 370    FINDINGS:  ANGIOGRAM CHEST: Pulmonary arteries are normal caliber and negative for pulmonary emboli. Thoracic aorta is not well opacified and is  indeterminate for dissection. No CT evidence of right heart strain.    LUNGS AND PLEURA: Centrilobular emphysematous changes. Mild right lower lobe bronchial wall thickening. No acute airspace infiltrate. No pneumothorax or pleural effusion.    MEDIASTINUM/AXILLAE: Heart size within normal limits. No pericardial effusion.    CORONARY ARTERY CALCIFICATION: Moderate.    HEPATOBILIARY: Normal.    PANCREAS: Normal.    SPLEEN:  Normal.    ADRENAL GLANDS: Normal.    KIDNEYS/BLADDER: Normal.    BOWEL: Diffusely fluid-filled GI tract. Distal colonic diverticulosis. Equivocal, mild pericolonic edema in the deep pelvis in the region of the redundant distal sigmoid colon. No free air.    LYMPH NODES: Normal.    VASCULATURE: Atherosclerotic abdominal aorta with saccular aneurysmal dilatation of the infrarenal portion measuring up to 3.3 cm in diameter.    PELVIC ORGANS: Absent uterus.    MUSCULOSKELETAL: Osteopenia with thoracolumbar spine degenerative disc changes. Right hip arthroplasty. Moderate left hip arthritis.      Impression    IMPRESSION:  1.  Negative for pulmonary embolism.    2.  Emphysema.    3.  Moderate coronary artery disease.    4.  Diffusely fluid-filled GI tract which could reflect a gastroenteritis. Distal colonic diverticulosis with suspected mild diverticulitis in the distal descending colon.    5.  3.3 cm abdominal aortic aneurysm.    REFERENCE:  [facility] practice guidelines on the care of patients with an abdominal aortic aneurysm. Clyde LEAL. J Vasc Surg, 2018. PMID: [id number].    Aorta size: 3.0 cm to 3.9 cm: Surveillance imaging at 3-year intervals.       Discharge Medications   Current Discharge Medication List        START taking these medications    Details   ciprofloxacin (CIPRO) 500 MG tablet Take 1 tablet (500 mg) by mouth 2 times daily for 7 days  Qty: 14 tablet, Refills: 0    Associated Diagnoses: Diverticulitis of colon      metroNIDAZOLE (FLAGYL) 500 MG tablet Take 1 tablet (500 mg) by mouth 3 times daily for 7 days  Qty: 21 tablet, Refills: 0    Associated Diagnoses: Diverticulitis of colon           CONTINUE these medications which have CHANGED    Details   apixaban ANTICOAGULANT (ELIQUIS) 2.5 MG tablet Take 1 tablet (2.5 mg) by mouth 2 times daily for 30 days  Qty: 60 tablet, Refills: 0    Associated Diagnoses: Acute heart failure with preserved ejection fraction (H)           CONTINUE these medications which  have NOT CHANGED    Details   acetaminophen (TYLENOL) 500 MG tablet Take 1,000 mg by mouth 2 times daily as needed      alendronate (FOSAMAX) 70 MG tablet Take 70 mg by mouth every 7 days      allopurinol (ZYLOPRIM) 100 MG tablet Take 100 mg by mouth daily      amLODIPine (NORVASC) 2.5 MG tablet TAKE 1 TABLET BY MOUTH EVERY DAY  Qty: 90 tablet, Refills: 2    Associated Diagnoses: Primary hypertension; Paroxysmal atrial fibrillation (H)      atorvastatin (LIPITOR) 40 MG tablet [ATORVASTATIN (LIPITOR) 40 MG TABLET] Take 40 mg by mouth bedtime.      baclofen (LIORESAL) 10 MG tablet [BACLOFEN (LIORESAL) 10 MG TABLET] Take 10 mg by mouth at bedtime.       calcium carbonate (OS-MARCELO) 600 mg calcium (1,500 mg) tablet [CALCIUM CARBONATE (OS-MARCELO) 600 MG CALCIUM (1,500 MG) TABLET] Take 1 tablet by mouth daily.       celecoxib (CELEBREX) 200 MG capsule [CELECOXIB (CELEBREX) 200 MG CAPSULE] Take 200 mg by mouth daily.      cetirizine (ZYRTEC) 10 MG tablet Take 10 mg by mouth daily      cyanocobalamin 1000 MCG tablet [CYANOCOBALAMIN 1000 MCG TABLET] Take 1,000 mcg by mouth daily.      hydrOXYzine (ATARAX) 25 MG tablet TAKE 1 TABLET BY MOUTH UP TO 3 TIMES DAILY AS NEEDED FOR ANXIETY      lactobacillus combo no.11 15 billion cell CpSP Take 1 capsule by mouth daily      lisinopril (ZESTRIL) 20 MG tablet TAKE 1 TABLET BY MOUTH TWICE A DAY FOR BLOOD PRESSURE      LORazepam (ATIVAN) 0.5 MG tablet [LORAZEPAM (ATIVAN) 0.5 MG TABLET] Take 0.5 mg by mouth 2 (two) times a day as needed.       magnesium oxide (MAG-OX) 400 mg tablet [MAGNESIUM OXIDE (MAG-OX) 400 MG TABLET] Take 400 mg by mouth daily.      metFORMIN (GLUCOPHAGE-XR) 500 MG 24 hr tablet [METFORMIN (GLUCOPHAGE-XR) 500 MG 24 HR TABLET] TAKE 1 TABLET BY MOUTH EVERY DAY FOR DIABETES  Refills: 1      sertraline (ZOLOFT) 25 MG tablet Take 25 mg by mouth daily      sotalol (BETAPACE) 80 MG tablet Take 1 tablet (80 mg) by mouth 2 times daily  Qty: 180 tablet, Refills: 3    Comments: In  place of metoprolol  Associated Diagnoses: Paroxysmal atrial fibrillation (H)      CONTOUR NEXT TEST test strip 2 times daily      Microlet Lancets MISC TEST BLOOD SUGAR 2 X'S DAILY DX: E11.9           Allergies   Allergies   Allergen Reactions    Seasonal Allergies

## 2024-04-11 NOTE — ED NOTES
[facility]  Expected date:   Expected time:   Means of arrival:   Comments:  WBL 83 female berenice

## 2024-04-12 ENCOUNTER — PATIENT OUTREACH (OUTPATIENT)
Dept: CARE COORDINATION | Facility: CLINIC | Age: 84
End: 2024-04-12
Payer: COMMERCIAL

## 2024-04-12 NOTE — PROGRESS NOTES
"Clinic Care Coordination Contact  [facility]: Post-Discharge Note  SITUATION                                                      Admission:    Admission Date: 04/10/24   Reason for Admission: Tachycardia, Lightheadedness  Discharge:   Discharge Date: 04/11/24  Discharge Diagnosis: Diverticulitis    BACKGROUND                                                      Per hospital discharge summary and inpatient provider notes:    Adilia Eric is a 83 year old female with history of anxiety, atrial fibrillation, hypertension, acute heart failure with preserved ejection fraction, who presents to the ER with complaints of tachycardia and lightheadedness.     She states that when she has gotten up today to go to the bathroom she has noted that her heart starts racing and she starts to feel little lightheaded.  This resolves after a few minutes of sitting down but seems to recur whenever she gets up to walk to the bathroom.     She otherwise denies any headaches, fever, cough, chest pain, shortness of breath, abdominal pain.  She states that she is having at least 4 episodes of diarrhea a day.  No vomiting.     She recently had a UTI and completed antibiotics but she states the diarrhea started before the antibiotics.     Patient is worried that she is having episodes of atrial fibrillation like she has had in the past.  She is on Eliquis.    ASSESSMENT      Discharge Assessment  How are you doing now that you are home?: \"Well I'm feeling a little bit better. I get tired but I'm better. I did have a BM this morning but it was not runny and I've been watching my diet.\"  How are your symptoms? (Red Flag symptoms escalate to triage hotline per guidelines): Improved  Do you feel your condition is stable enough to be safe at home until your provider visit?: Yes  Does the patient have their discharge instructions? : Yes  Does the patient have questions regarding their discharge instructions? : No  Were you started on any " new medications or were there changes to any of your previous medications? : Yes  Does the patient have all of their medications?: Yes  Do you have questions regarding any of your medications? : No  Do you have all of your needed medical supplies or equipment (DME)?  (i.e. oxygen tank, CPAP, cane, etc.): Yes  Discharge follow-up appointment scheduled within 14 calendar days? : Yes  Discharge Follow Up Appointment Date: 04/16/24  Discharge Follow Up Appointment Scheduled with?: Primary Care Provider  Is patient agreeable to assistance with scheduling? : Yes    Post-op ([facility] Only)  If the patient had a surgery or procedure, do they have any questions for a nurse?: No      PLAN                                                      Outpatient Plan:      Follow-ups Needed After Discharge  Follow-up Appointments     Follow-up and recommended labs and tests       Follow up with primary care provider, Malcolm Carrington, within 7 days for   hospital follow- up.  The following labs/tests are recommended: Basic   Metabolic Profile and Complete Blood Count.      Future Appointments   Date Time Provider Department Center   4/15/2024  1:30 PM Tavia Maxwell, APRN CNP [facility]         For any urgent concerns, please contact our 24 hour nurse triage line: [phone] ([phone])         ELKIN Lacey  [phone]  Connected Care Resource [facility]

## 2024-04-15 ENCOUNTER — OFFICE VISIT (OUTPATIENT)
Dept: CARDIOLOGY | Facility: CLINIC | Age: 84
End: 2024-04-15
Payer: COMMERCIAL

## 2024-04-15 VITALS
WEIGHT: 125 LBS | BODY MASS INDEX: 24.41 KG/M2 | HEART RATE: 82 BPM | RESPIRATION RATE: 14 BRPM | SYSTOLIC BLOOD PRESSURE: 164 MMHG | DIASTOLIC BLOOD PRESSURE: 90 MMHG

## 2024-04-15 DIAGNOSIS — I48.0 PAROXYSMAL ATRIAL FIBRILLATION (H): Primary | ICD-10-CM

## 2024-04-15 DIAGNOSIS — I10 PRIMARY HYPERTENSION: ICD-10-CM

## 2024-04-15 DIAGNOSIS — I50.31 ACUTE HEART FAILURE WITH PRESERVED EJECTION FRACTION (H): ICD-10-CM

## 2024-04-15 PROCEDURE — 99214 OFFICE O/P EST MOD 30 MIN: CPT | Performed by: NURSE PRACTITIONER

## 2024-04-15 PROCEDURE — G2211 COMPLEX E/M VISIT ADD ON: HCPCS | Performed by: NURSE PRACTITIONER

## 2024-04-15 NOTE — PROGRESS NOTES
HEART CARE ELECTROPHYSIOLOGY NOTE      [facility]  [phone]      Assessment/Recommendations   Assessment/Plan:  1.  Persistent Atrial Fibrillation: Symptomatic improvement with maintenance of sinus rhythm; no symptomatology or evidence of AF recurrence.  We reviewed treatment options including medications or pulmonary vein isolation ablation.  She is potentially interested in catheter ablation, but recommend waiting until she has recovered from diverticulitis.  -- Continue sotalol 80 mg twice daily     She was reassured that atrial fibrillation is not life-threatening, but carries an increased risk for stroke.  She has a [id number] score of 9 for age >75, female gender, HTN, HF, CAD, DM 2, TIA.  She also has an increased risk for bleed due to gait instability and history of SAH.  She may be a candidate for left atrial appendage closure.  Continue Eliquis 2.5 mg twice daily for stroke prophylaxis (age >80, weight <60 kg).    2.  Acute heart failure with preserved ejection fraction: Secondary to RVR.  Compensated.       3.  Hypertension: Elevated today, but had issues with hypotension during her hospitalization.  No changes in medication regimen today; continue amlodipine, lisinopril, and sotalol as above.  Monitor blood pressure at home and bring readings to her PCP appointment next week for further evaluation and management.    Follow up with EP MD in 3 months     History of Present Illness/Subjective    HPI: Adilia Eric is a 83 year old female who comes in today accompanied by her daughter-in-law for EP follow-up of atrial fibrillation.  She has a history of paroxysmal atrial fibrillation, hypertension, mild nonobstructive coronary artery disease on angiography in 2013, hyponatremia, type 2 diabetes, remote history of possible TIA, intracranial hemorrhage 2011 without recurrence, abdominal aortic aneurysm (3.3 cm), possible obstructive sleep apnea.    Hospitalized 4/10/2024 for  dehydration secondary to ongoing diarrhea.  Diagnosed with diverticulitis and started on antibiotics.  During her brief hospitalization, she had sinus tachycardia due to dehydration which resolved, but no A-fib.    Arrhythmia history  Dx/date: Paroxysmal atrial fibrillation diagnosed January 2023.  She was seen in the ED on 2/5/2024 for progressive shortness of breath, wheezing, and fatigue for a little over a week.  She was noted to be in AF-RVR.  Rates did not slow with metoprolol, so she underwent urgent cardioversion.  Sx: Fatigue, progressive shortness of breath, acute HFpEF  [id number] score: 9 for age >75, female gender, HTN, HF, CAD, DM 2, TIA  Oral anticoagulation: Eliquis 5 mg twice daily  Antiarrhythmic medications, AV caitlyn blocking agents: Metoprolol XL 50 mg daily  Procedures  DCCV: 2/5/2024 (ED)  Ablation: N/A    Adilia states she does not feel well today, but felt well yesterday.  He may have overdone it yesterday.  Her pulse has continued to be regular, no episodes of A-fib.  She continues to have issues with diarrhea, now has a runny nose as well.  She has a baseline of dyspnea on exertion.  She denies chest discomfort, palpitations, significant peripheral edema, shortness of breath at rest, paroxysmal nocturnal dyspnea, orthopnea, lightheadedness, dizziness, pre-syncope, or syncope.    Cardiographics (EKG personally reviewed):  EKG done 4/10/2024 shows sinus rhythm at 98 bpm, QRS 70 ms, QT/QTc 326/460 ms  EKG done 2/5/2024 shows atrial fibrillation with rapid ventricular response at 118 bpm.  Subsequent EKG shows sinus rhythm at 81 bpm, QRS 66 ms, QT/QTc 348/404 ms  EKG done 1/20/2023 shows atrial fibrillation with rapid ventricular response at 126 bpm    ECHO done 2/17/2023:  Left ventricular size, wall motion and function are normal. The ejection  fraction is 55-60%.  Normal right ventricle size and systolic function.  Both atria are of normal size.  No hemodynamically significant valvular  abnormalities on 2D or color flow  imaging.  There is no comparison study available.    NM stress test done 5/18/2022:    Lexiscan stress ECG negative for ischemia.    The nuclear stress test is negative for inducible myocardial ischemia or infarction.    The left ventricular ejection fraction at stress is greater than 70%.    A prior study was conducted on 4/16/2019.  No interval change.    I have reviewed and updated the patient's Past Medical History, Social History, Family History and Medication List.     Physical Examination  Review of Systems   Vitals: BP (!) 164/90 (BP Location: Left arm, Patient Position: Sitting)   Pulse 82   Resp 14   Wt 56.7 kg (125 lb)   BMI 24.41 kg/m    BMI= Body mass index is 24.41 kg/m .  Wt Readings from Last 3 Encounters:   04/15/24 56.7 kg (125 lb)   04/10/24 58.5 kg (129 lb)   03/22/24 59 kg (130 lb)       General Appearance:   Alert, well-appearing and in no acute distress.   HEENT: Atraumatic, normocephalic.  No scleral icterus, normal conjunctivae, EOMs intact, PERRL.  Mucous membranes pink and moist.     Chest/Lungs:   Chest symmetric, spine straight.  Respirations unlabored.  Lungs are clear to auscultation.   Cardiovascular:   Regular rate and rhythm.  Normal first and second heart sounds with no murmurs, rubs, or gallops; radial pulses are intact, No edema.   Abdomen:  Soft, nondistended, bowel sounds present.   Extremities: No cyanosis or clubbing.   Musculoskeletal: Moves all extremities.     Skin: Warm, dry, intact.    Neurologic: Mood and affect are appropriate.  Alert and oriented to person, place, time, and situation.     ROS: 10 point ROS neg other than the symptoms noted above in the HPI.        Medical History  Surgical History Family History Social History   Past Medical History:   Diagnosis Date    Depression     Diabetes mellitus (H)     GERD (gastroesophageal reflux disease)     Hyperlipemia     Hypertension     Intracranial hemorrhage (H) 2011    may be  amyloid angiopathy; has been stable.    Mastoiditis 2011    Osteoarthritis     TMJ (temporomandibular joint disorder)      Past Surgical History:   Procedure Laterality Date    CYST REMOVAL      tongue    HYSTERECTOMY  1996    OOPHORECTOMY Bilateral 1996     Family History   Problem Relation Age of Onset    Heart Disease Father     No Known Problems Mother     No Known Problems Sister     No Known Problems Daughter     No Known Problems Maternal Grandmother     No Known Problems Maternal Grandfather     No Known Problems Paternal Grandmother     No Known Problems Paternal Grandfather     No Known Problems Maternal Aunt     No Known Problems Paternal Aunt     Hereditary Breast and Ovarian Cancer Syndrome No family hx of     Breast Cancer No family hx of     Cancer No family hx of     Colon Cancer No family hx of     Endometrial Cancer No family hx of     Ovarian Cancer No family hx of         Social History     Socioeconomic History    Marital status:      Spouse name: Not on file    Number of children: Not on file    Years of education: Not on file    Highest education level: Not on file   Occupational History    Not on file   Tobacco Use    Smoking status: Former    Smokeless tobacco: Never   Substance and Sexual Activity    Alcohol use: Yes     Alcohol/week: 1.7 standard drinks of alcohol    Drug use: No    Sexual activity: Not on file   Other Topics Concern    Not on file   Social History Narrative    Not on file     Social Determinants of Health     Financial Resource Strain: Low Risk  (12/29/2023)    [facility]    Financial Resource Strain     Difficulty of Paying Living Expenses: 3     Difficulty of Paying Living Expenses: Not on file   Food Insecurity: No Food Insecurity (12/29/2023)    [facility]    Food Insecurity      Worried About Running Out of Food in the Last Year: 1   Transportation Needs: No Transportation Needs (12/29/2023)    [facility], [email or web address]  [facility]    Transportation Needs     Lack of Transportation (Medical): 1   Physical Activity: Not on file   Stress: Not on file   Social Connections: Socially Integrated (12/29/2023)    [facility]    Social Connections     Frequency of Communication with Friends and Family: 0   Interpersonal Safety: Not on file   Housing Stability: Low Risk  (12/29/2023)    Received from [facility], [employer] [email or web address]    Housing Stability     Unable to Pay for Housing in the Last Year: 1           Medications  Allergies   Current Outpatient Medications   Medication Sig Dispense Refill    acetaminophen (TYLENOL) 500 MG tablet Take 1,000 mg by mouth 2 times daily as needed      alendronate (FOSAMAX) 70 MG tablet Take 70 mg by mouth every 7 days      allopurinol (ZYLOPRIM) 100 MG tablet Take 100 mg by mouth daily      amLODIPine (NORVASC) 2.5 MG tablet TAKE 1 TABLET BY MOUTH EVERY DAY 90 tablet 2    apixaban ANTICOAGULANT (ELIQUIS) 2.5 MG tablet Take 1 tablet (2.5 mg) by mouth 2 times daily for 30 days 60 tablet 0    atorvastatin (LIPITOR) 40 MG tablet [ATORVASTATIN (LIPITOR) 40 MG TABLET] Take 40 mg by mouth bedtime.      baclofen (LIORESAL) 10 MG tablet [BACLOFEN (LIORESAL) 10 MG TABLET] Take 10 mg by mouth at bedtime.       calcium carbonate (OS-MARCELO) 600 mg calcium (1,500 mg) tablet [CALCIUM CARBONATE (OS-MARCELO) 600 MG CALCIUM (1,500 MG) TABLET] Take 1 tablet by mouth daily.       celecoxib (CELEBREX) 200 MG capsule [CELECOXIB (CELEBREX) 200 MG CAPSULE] Take 200 mg by mouth daily.      cetirizine (ZYRTEC) 10 MG tablet Take 10 mg by mouth daily      ciprofloxacin (CIPRO)  500 MG tablet Take 1 tablet (500 mg) by mouth 2 times daily for 7 days 14 tablet 0    CONTOUR NEXT TEST test strip 2 times daily      cyanocobalamin 1000 MCG tablet [CYANOCOBALAMIN 1000 MCG TABLET] Take 1,000 mcg by mouth daily.      hydrOXYzine (ATARAX) 25 MG tablet TAKE 1 TABLET BY MOUTH UP TO 3 TIMES DAILY AS NEEDED FOR ANXIETY      lactobacillus combo no.11 15 billion cell CpSP Take 1 capsule by mouth daily      lisinopril (ZESTRIL) 20 MG tablet TAKE 1 TABLET BY MOUTH TWICE A DAY FOR BLOOD PRESSURE      LORazepam (ATIVAN) 0.5 MG tablet [LORAZEPAM (ATIVAN) 0.5 MG TABLET] Take 0.5 mg by mouth 2 (two) times a day as needed.       magnesium oxide (MAG-OX) 400 mg tablet [MAGNESIUM OXIDE (MAG-OX) 400 MG TABLET] Take 400 mg by mouth daily.      metFORMIN (GLUCOPHAGE-XR) 500 MG 24 hr tablet [METFORMIN (GLUCOPHAGE-XR) 500 MG 24 HR TABLET] TAKE 1 TABLET BY MOUTH EVERY DAY FOR DIABETES  1    metroNIDAZOLE (FLAGYL) 500 MG tablet Take 1 tablet (500 mg) by mouth 3 times daily for 7 days 21 tablet 0    Microlet Lancets MISC TEST BLOOD SUGAR 2 X'S DAILY DX: E11.9      sertraline (ZOLOFT) 25 MG tablet Take 25 mg by mouth daily      sotalol (BETAPACE) 80 MG tablet Take 1 tablet (80 mg) by mouth 2 times daily 180 tablet 3       Allergies   Allergen Reactions    Seasonal Allergies           Lab Results    Chemistry/lipid CBC Cardiac Enzymes/BNP/TSH/INR   Recent Labs   Lab Test 05/11/23  1000   CHOL 117   HDL 51   LDL 43   TRIG 113     Recent Labs   Lab Test 05/11/23  1000 05/16/22  0950 07/31/20  0923   LDL 43 50 57     Recent Labs   Lab Test 02/05/24  1006      POTASSIUM 4.4   CHLORIDE 99   CO2 26   *   BUN 9.4   CR 0.80   GFRESTIMATED 73   MARCELO 9.6     Recent Labs   Lab Test 02/05/24  1006 01/31/24  1218 11/13/23  1123   CR 0.80 0.81 0.80     Recent Labs   Lab Test 12/15/21  1209   A1C 5.9*          Recent Labs   Lab Test 02/05/24  1006   WBC 7.7   HGB 14.4   HCT 44.6   MCV 98        Recent Labs   Lab Test  "02/05/24  1006 05/11/23  1000 05/16/22  0950   HGB 14.4 14.7 15.3    Recent Labs   Lab Test 05/05/22  1845 04/28/22  1416 01/13/21  0954   TROPONINI <0.01 <0.01 <0.01     Recent Labs   Lab Test 02/05/24  1006 05/05/22  1845 04/28/22  1416 05/26/21  1509   BNP  --  51 53 46   NTBNPI 3,675*  --   --   --      Recent Labs   Lab Test 05/11/23  1000   TSH 0.96     No results for input(s): \"INR\" in the last 22224 hours.       The longitudinal plan of care for the diagnosis(es)/condition(s) as documented were addressed during this visit. Due to the added complexity in care, I will continue to support Adilia in the subsequent management and with ongoing continuity of care.                                         "

## 2024-04-15 NOTE — LETTER
4/15/2024    Malcolm Carrington  [address]  [facility] [address]    RE: Adilia Eric       Dear Colleague,     I had the pleasure of seeing Adilia Eric in the [facility].    HEART CARE ELECTROPHYSIOLOGY NOTE      [facility]  [phone]      Assessment/Recommendations   Assessment/Plan:  1.  Persistent Atrial Fibrillation: Symptomatic improvement with maintenance of sinus rhythm; no symptomatology or evidence of AF recurrence.  We reviewed treatment options including medications or pulmonary vein isolation ablation.  She is potentially interested in catheter ablation, but recommend waiting until she has recovered from diverticulitis.  -- Continue sotalol 80 mg twice daily     She was reassured that atrial fibrillation is not life-threatening, but carries an increased risk for stroke.  She has a [id number] score of 9 for age >75, female gender, HTN, HF, CAD, DM 2, TIA.  She also has an increased risk for bleed due to gait instability and history of SAH.  She may be a candidate for left atrial appendage closure.  Continue Eliquis 2.5 mg twice daily for stroke prophylaxis (age >80, weight <60 kg).    2.  Acute heart failure with preserved ejection fraction: Secondary to RVR.  Compensated.       3.  Hypertension: Elevated today, but had issues with hypotension during her hospitalization.  No changes in medication regimen today; continue amlodipine, lisinopril, and sotalol as above.  Monitor blood pressure at home and bring readings to her PCP appointment next week for further evaluation and management.    Follow up with EP MD in 3 months     History of Present Illness/Subjective    HPI: Adilia Eric is a 83 year old female who comes in today accompanied by her daughter-in-law for EP follow-up of atrial fibrillation.  She has a history of paroxysmal atrial fibrillation, hypertension, mild nonobstructive coronary artery disease on angiography in 2013, hyponatremia, type 2  diabetes, remote history of possible TIA, intracranial hemorrhage 2011 without recurrence, abdominal aortic aneurysm (3.3 cm), possible obstructive sleep apnea.    Hospitalized 4/10/2024 for dehydration secondary to ongoing diarrhea.  Diagnosed with diverticulitis and started on antibiotics.  During her brief hospitalization, she had sinus tachycardia due to dehydration which resolved, but no A-fib.    Arrhythmia history  Dx/date: Paroxysmal atrial fibrillation diagnosed January 2023.  She was seen in the ED on 2/5/2024 for progressive shortness of breath, wheezing, and fatigue for a little over a week.  She was noted to be in AF-RVR.  Rates did not slow with metoprolol, so she underwent urgent cardioversion.  Sx: Fatigue, progressive shortness of breath, acute HFpEF  [id number] score: 9 for age >75, female gender, HTN, HF, CAD, DM 2, TIA  Oral anticoagulation: Eliquis 5 mg twice daily  Antiarrhythmic medications, AV caitlyn blocking agents: Metoprolol XL 50 mg daily  Procedures  DCCV: 2/5/2024 (ED)  Ablation: N/A    Adilia states she does not feel well today, but felt well yesterday.  He may have overdone it yesterday.  Her pulse has continued to be regular, no episodes of A-fib.  She continues to have issues with diarrhea, now has a runny nose as well.  She has a baseline of dyspnea on exertion.  She denies chest discomfort, palpitations, significant peripheral edema, shortness of breath at rest, paroxysmal nocturnal dyspnea, orthopnea, lightheadedness, dizziness, pre-syncope, or syncope.    Cardiographics (EKG personally reviewed):  EKG done 4/10/2024 shows sinus rhythm at 98 bpm, QRS 70 ms, QT/QTc 326/460 ms  EKG done 2/5/2024 shows atrial fibrillation with rapid ventricular response at 118 bpm.  Subsequent EKG shows sinus rhythm at 81 bpm, QRS 66 ms, QT/QTc 348/404 ms  EKG done 1/20/2023 shows atrial fibrillation with rapid ventricular response at 126 bpm    ECHO done 2/17/2023:  Left ventricular size, wall  motion and function are normal. The ejection  fraction is 55-60%.  Normal right ventricle size and systolic function.  Both atria are of normal size.  No hemodynamically significant valvular abnormalities on 2D or color flow  imaging.  There is no comparison study available.    NM stress test done 5/18/2022:    Lexiscan stress ECG negative for ischemia.    The nuclear stress test is negative for inducible myocardial ischemia or infarction.    The left ventricular ejection fraction at stress is greater than 70%.    A prior study was conducted on 4/16/2019.  No interval change.    I have reviewed and updated the patient's Past Medical History, Social History, Family History and Medication List.     Physical Examination  Review of Systems   Vitals: BP (!) 164/90 (BP Location: Left arm, Patient Position: Sitting)   Pulse 82   Resp 14   Wt 56.7 kg (125 lb)   BMI 24.41 kg/m    BMI= Body mass index is 24.41 kg/m .  Wt Readings from Last 3 Encounters:   04/15/24 56.7 kg (125 lb)   04/10/24 58.5 kg (129 lb)   03/22/24 59 kg (130 lb)       General Appearance:   Alert, well-appearing and in no acute distress.   HEENT: Atraumatic, normocephalic.  No scleral icterus, normal conjunctivae, EOMs intact, PERRL.  Mucous membranes pink and moist.     Chest/Lungs:   Chest symmetric, spine straight.  Respirations unlabored.  Lungs are clear to auscultation.   Cardiovascular:   Regular rate and rhythm.  Normal first and second heart sounds with no murmurs, rubs, or gallops; radial pulses are intact, No edema.   Abdomen:  Soft, nondistended, bowel sounds present.   Extremities: No cyanosis or clubbing.   Musculoskeletal: Moves all extremities.     Skin: Warm, dry, intact.    Neurologic: Mood and affect are appropriate.  Alert and oriented to person, place, time, and situation.     ROS: 10 point ROS neg other than the symptoms noted above in the HPI.        Medical History  Surgical History Family History Social History   Past Medical  History:   Diagnosis Date    Depression     Diabetes mellitus (H)     GERD (gastroesophageal reflux disease)     Hyperlipemia     Hypertension     Intracranial hemorrhage (H) 2011    may be amyloid angiopathy; has been stable.    Mastoiditis 2011    Osteoarthritis     TMJ (temporomandibular joint disorder)      Past Surgical History:   Procedure Laterality Date    CYST REMOVAL      tongue    HYSTERECTOMY  1996    OOPHORECTOMY Bilateral 1996     Family History   Problem Relation Age of Onset    Heart Disease Father     No Known Problems Mother     No Known Problems Sister     No Known Problems Daughter     No Known Problems Maternal Grandmother     No Known Problems Maternal Grandfather     No Known Problems Paternal Grandmother     No Known Problems Paternal Grandfather     No Known Problems Maternal Aunt     No Known Problems Paternal Aunt     Hereditary Breast and Ovarian Cancer Syndrome No family hx of     Breast Cancer No family hx of     Cancer No family hx of     Colon Cancer No family hx of     Endometrial Cancer No family hx of     Ovarian Cancer No family hx of         Social History     Socioeconomic History    Marital status:      Spouse name: Not on file    Number of children: Not on file    Years of education: Not on file    Highest education level: Not on file   Occupational History    Not on file   Tobacco Use    Smoking status: Former    Smokeless tobacco: Never   Substance and Sexual Activity    Alcohol use: Yes     Alcohol/week: 1.7 standard drinks of alcohol    Drug use: No    Sexual activity: Not on file   Other Topics Concern    Not on file   Social History Narrative    Not on file     Social Determinants of Health     Financial Resource Strain: Low Risk  (12/29/2023)    [facility] [facility] [employer] [facility] [employer] [facility]    Financial Resource Strain     Difficulty of Paying Living Expenses: 3     Difficulty of Paying Living Expenses:  Not on file   Food Insecurity: No Food Insecurity (12/29/2023)    Received from [facility], [facility]    Food Insecurity     Worried About Running Out of Food in the Last Year: 1   Transportation Needs: No Transportation Needs (12/29/2023)    Received from [facility], [facility]    Transportation Needs     Lack of Transportation (Medical): 1   Physical Activity: Not on file   Stress: Not on file   Social Connections: Socially Integrated (12/29/2023)    Received from [facility], [facility]    Social Connections     Frequency of Communication with Friends and Family: 0   Interpersonal Safety: Not on file   Housing Stability: Low Risk  (12/29/2023)    Received from [facility], [facility]    Housing Stability     Unable to Pay for Housing in the Last Year: 1           Medications  Allergies   Current Outpatient Medications   Medication Sig Dispense Refill    acetaminophen (TYLENOL) 500 MG tablet Take 1,000 mg by mouth 2 times daily as needed      alendronate (FOSAMAX) 70 MG tablet Take 70 mg by mouth every 7 days      allopurinol (ZYLOPRIM) 100 MG tablet Take 100 mg by mouth daily      amLODIPine (NORVASC) 2.5 MG tablet TAKE 1 TABLET BY MOUTH EVERY DAY 90 tablet 2    apixaban ANTICOAGULANT (ELIQUIS) 2.5 MG tablet Take 1 tablet (2.5 mg) by mouth 2 times daily for 30 days 60 tablet 0    atorvastatin (LIPITOR) 40 MG tablet [ATORVASTATIN (LIPITOR) 40 MG TABLET] Take 40 mg by mouth bedtime.      baclofen (LIORESAL) 10 MG tablet [BACLOFEN (LIORESAL) 10 MG TABLET] Take 10 mg by mouth at bedtime.       calcium carbonate (OS-MARCELO) 600 mg calcium (1,500 mg) tablet [CALCIUM CARBONATE (OS-MARCELO) 600 MG CALCIUM (1,500 MG) TABLET] Take 1 tablet by mouth daily.        celecoxib (CELEBREX) 200 MG capsule [CELECOXIB (CELEBREX) 200 MG CAPSULE] Take 200 mg by mouth daily.      cetirizine (ZYRTEC) 10 MG tablet Take 10 mg by mouth daily      ciprofloxacin (CIPRO) 500 MG tablet Take 1 tablet (500 mg) by mouth 2 times daily for 7 days 14 tablet 0    CONTOUR NEXT TEST test strip 2 times daily      cyanocobalamin 1000 MCG tablet [CYANOCOBALAMIN 1000 MCG TABLET] Take 1,000 mcg by mouth daily.      hydrOXYzine (ATARAX) 25 MG tablet TAKE 1 TABLET BY MOUTH UP TO 3 TIMES DAILY AS NEEDED FOR ANXIETY      lactobacillus combo no.11 15 billion cell CpSP Take 1 capsule by mouth daily      lisinopril (ZESTRIL) 20 MG tablet TAKE 1 TABLET BY MOUTH TWICE A DAY FOR BLOOD PRESSURE      LORazepam (ATIVAN) 0.5 MG tablet [LORAZEPAM (ATIVAN) 0.5 MG TABLET] Take 0.5 mg by mouth 2 (two) times a day as needed.       magnesium oxide (MAG-OX) 400 mg tablet [MAGNESIUM OXIDE (MAG-OX) 400 MG TABLET] Take 400 mg by mouth daily.      metFORMIN (GLUCOPHAGE-XR) 500 MG 24 hr tablet [METFORMIN (GLUCOPHAGE-XR) 500 MG 24 HR TABLET] TAKE 1 TABLET BY MOUTH EVERY DAY FOR DIABETES  1    metroNIDAZOLE (FLAGYL) 500 MG tablet Take 1 tablet (500 mg) by mouth 3 times daily for 7 days 21 tablet 0    Microlet Lancets MISC TEST BLOOD SUGAR 2 X'S DAILY DX: E11.9      sertraline (ZOLOFT) 25 MG tablet Take 25 mg by mouth daily      sotalol (BETAPACE) 80 MG tablet Take 1 tablet (80 mg) by mouth 2 times daily 180 tablet 3       Allergies   Allergen Reactions    Seasonal Allergies           Lab Results    Chemistry/lipid CBC Cardiac Enzymes/BNP/TSH/INR   Recent Labs   Lab Test 05/11/23  1000   CHOL 117   HDL 51   LDL 43   TRIG 113     Recent Labs   Lab Test 05/11/23  1000 05/16/22  0950 07/31/20  0923   LDL 43 50 57     Recent Labs   Lab Test 02/05/24  1006      POTASSIUM 4.4   CHLORIDE 99   CO2 26   *   BUN 9.4   CR 0.80   GFRESTIMATED 73   MARCELO 9.6     Recent Labs   Lab Test 02/05/24  1006 01/31/24  1218 11/13/23  1123   CR 0.80  "0.81 0.80     Recent Labs   Lab Test 12/15/21  1209   A1C 5.9*          Recent Labs   Lab Test 02/05/24  1006   WBC 7.7   HGB 14.4   HCT 44.6   MCV 98        Recent Labs   Lab Test 02/05/24  1006 05/11/23  1000 05/16/22  0950   HGB 14.4 14.7 15.3    Recent Labs   Lab Test 05/05/22  1845 04/28/22  1416 01/13/21  0954   TROPONINI <0.01 <0.01 <0.01     Recent Labs   Lab Test 02/05/24  1006 05/05/22  1845 04/28/22  1416 05/26/21  1509   BNP  --  51 53 46   NTBNPI 3,675*  --   --   --      Recent Labs   Lab Test 05/11/23  1000   TSH 0.96     No results for input(s): \"INR\" in the last [id number] hours.       The longitudinal plan of care for the diagnosis(es)/condition(s) as documented were addressed during this visit. Due to the added complexity in care, I will continue to support Adilia in the subsequent management and with ongoing continuity of care.              Thank you for allowing me to participate in the care of your patient.      Sincerely,     PARVIN Gunn CNP     [facility]  cc:   PARVIN Gunn CNP  [address], [address]  [place] [address]      "

## 2024-04-15 NOTE — PATIENT INSTRUCTIONS
Adilia Eric,    It was a pleasure to see you today at the [facility].     My recommendations after this visit include:    Continue current medications    Check blood pressure, bring to PCP for further evaluation and treatment    Consider ablation to treat A fib once you have recovered from diverticulitis    Follow up with EP MD in 3 months (Dr. Tovar, Suleman, or Rigoberto)    Tavia Maxwell, CNP  [facility], Electrophysiology  [phone]  EP nurses [phone]

## 2024-05-20 ENCOUNTER — TELEPHONE (OUTPATIENT)
Dept: PHARMACY | Facility: OTHER | Age: 84
End: 2024-05-20
Payer: COMMERCIAL

## 2024-05-20 NOTE — TELEPHONE ENCOUNTER
[facility] Recruitment: [facility]  insurance     Referral outreach attempt #1 on May 20, 2024      Outcome: patient opted out    See Dada  [facility]   [phone]

## 2024-07-14 NOTE — PROGRESS NOTES
HEART CARE ENCOUNTER CONSULTATON NOTE      [facility]  [phone]      Assessment/Recommendations   Assessment/Plan:    Adilia Eric is a very pleasant 83 year old female with atrial fibrillation, hypertension, mild nonobstructive coronary artery disease on angiography in 2013, hyponatremia, type 2 diabetes, remote history of possible TIA, intracranial hemorrhage 2011 without recurrence, abdominal aortic aneurysm (3.3 cm), possible obstructive sleep apnea who presents today to the EP clinic.    Persistent atrial fibrillation  - We reviewed the pathophysiology of atrial fibrillation and management considerations including stroke risk and anticoagulation, rate control, cardioversion, antiarrhythmic drug therapy, and catheter ablation. We discussed atrial fibrillation ablation procedures, anticipated success rates, the potential need for re-do ablation vs addition of anti-arrhythmic drugs, procedural risks (including groin bleeding, tamponade, phrenic or esophageal injury, stroke, pulmonary vein stenosis) and recovery expectations.  -Currently on sotalol, she would like to think about an ablation  - she is currently moving into an assisted living facility, so would like a call in a month to discuss whether she would like to pursue an AF ablation  - we discussed the ongoing importance of lifestyle modification (maintaining a healthy weight, sleep apnea diagnosis and management, alcohol avoidance) as part of a long term strategy for atrial fibrillation management    2. Anticoagulation  -Continue Eliquis 2.5 mg twice daily    3.  Hypertension  -Well-controlled  -Continue current medications    4.  HFpEF  -Euvolemic on exam today    Time spent: 60 minutes spent on the date of the encounter doing chart review, history and exam, documentation and further activities as noted above.    The longitudinal plan of care for the condition(s) below were addressed during this visit. Due to the added  complexity in care, I will continue support in the subsequent management of this condition(s) and with the ongoing continuity of care of this condition(s).          History of Present Illness/Subjective    HPI: Adilia Eric is a very pleasant 83 year old female with atrial fibrillation, hypertension, mild nonobstructive coronary artery disease on angiography in 2013, hyponatremia, type 2 diabetes, remote history of possible TIA, intracranial hemorrhage 2011 without recurrence, abdominal aortic aneurysm (3.3 cm), possible obstructive sleep apnea who presents today to the EP clinic.    Adilia was diagnosed with atrial fibrillation in January 2023. She was seen in the ED on 2/5/2024 for progressive shortness of breath, wheezing, and fatigue for a little over a week. She was noted to be in AF-RVR. Rates did not slow with metoprolol, so she underwent urgent cardioversion.    She is currently on sotalol 80 mg twice daily and Eliquis 2.5 mg twice daily for anticoagulation.    She has not had any Afib since the February 2024.    She is currently moving into an assisted living facility, so would like a call in a month to discuss whether she would like to pursue an AF ablation    Recent ECG(personally reviewed):      ECHO done 2/17/2023:  Left ventricular size, wall motion and function are normal. The ejection  fraction is 55-60%.  Normal right ventricle size and systolic function.  Both atria are of normal size.  No hemodynamically significant valvular abnormalities on 2D or color flow  imaging.  There is no comparison study available.     NM stress test done 5/18/2022:    Lexiscan stress ECG negative for ischemia.    The nuclear stress test is negative for inducible myocardial ischemia or infarction.    The left ventricular ejection fraction at stress is greater than 70%.    A prior study was conducted on 4/16/2019.  No interval change.    Labs below reviewed personally     Physical Examination  Review of Systems    Vitals: /68 (BP Location: Right arm, Patient Position: Sitting, Cuff Size: Adult Regular)   Pulse 77   Resp 14   Wt 55.3 kg (122 lb)   BMI 23.83 kg/m    BMI= Body mass index is 23.83 kg/m .  Wt Readings from Last 3 Encounters:   07/15/24 55.3 kg (122 lb)   04/15/24 56.7 kg (125 lb)   04/10/24 58.5 kg (129 lb)       General Appearance:   no distress, normal body habitus   ENT/Mouth: membranes moist, no oral lesions or bleeding gums.      EYES:  no scleral icterus, normal conjunctivae   Neck: no carotid bruits or thyromegaly   Chest/Lungs:   lungs are clear to auscultation, no rales or wheezing, no sternal scar, equal chest wall expansion    Cardiovascular:   Regular. Normal first and second heart sounds with no murmurs, rubs, or gallops; the carotid, radial and posterior tibial pulses are intact, no edema bilaterally    Abdomen:  no organomegaly, masses, bruits, or tenderness; bowel sounds are present   Extremities: no cyanosis or clubbing   Skin: no xanthelasma, warm.    Neurologic: normal  bilateral, no tremors     Psychiatric: alert and oriented x3, calm        Please refer above for cardiac ROS details.        Medical History  Surgical History Family History Social History   Past Medical History:   Diagnosis Date    Depression     Diabetes mellitus (H)     GERD (gastroesophageal reflux disease)     Hyperlipemia     Hypertension     Intracranial hemorrhage (H) 2011    may be amyloid angiopathy; has been stable.    Mastoiditis 2011    Osteoarthritis     TMJ (temporomandibular joint disorder)      Past Surgical History:   Procedure Laterality Date    CYST REMOVAL      tongue    HYSTERECTOMY  1996    OOPHORECTOMY Bilateral 1996     Family History   Problem Relation Age of Onset    Heart Disease Father     No Known Problems Mother     No Known Problems Sister     No Known Problems Daughter     No Known Problems Maternal Grandmother     No Known Problems Maternal Grandfather     No Known Problems  Paternal Grandmother     No Known Problems Paternal Grandfather     No Known Problems Maternal Aunt     No Known Problems Paternal Aunt     Hereditary Breast and Ovarian Cancer Syndrome No family hx of     Breast Cancer No family hx of     Cancer No family hx of     Colon Cancer No family hx of     Endometrial Cancer No family hx of     Ovarian Cancer No family hx of         Social History     Socioeconomic History    Marital status:      Spouse name: Not on file    Number of children: Not on file    Years of education: Not on file    Highest education level: Not on file   Occupational History    Not on file   Tobacco Use    Smoking status: Former    Smokeless tobacco: Never   Substance and Sexual Activity    Alcohol use: Yes     Alcohol/week: 1.7 standard drinks of alcohol    Drug use: No    Sexual activity: Not on file   Other Topics Concern    Not on file   Social History Narrative    Not on file     Social Determinants of Health     Financial Resource Strain: Low Risk  (12/29/2023)    [facility]    Financial Resource Strain     Difficulty of Paying Living Expenses: 3     Difficulty of Paying Living Expenses: Not on file   Food Insecurity: No Food Insecurity (12/29/2023)    [facility]    Food Insecurity     Worried About Running Out of Food in the Last Year: 1   Transportation Needs: No Transportation Needs (12/29/2023)    [facility]    Transportation Needs     Lack of Transportation (Medical): 1   Physical Activity: Not on file   Stress: Not on file   Social Connections: Socially Integrated (12/29/2023)    [facility]    Social  Connections     Frequency of Communication with Friends and Family: 0   Interpersonal Safety: Not on file   Housing Stability: Low Risk  (12/29/2023)    Received from [facility]    Housing Stability     Unable to Pay for Housing in the Last Year: 1           Medications  Allergies   Current Outpatient Medications   Medication Sig Dispense Refill    acetaminophen (TYLENOL) 500 MG tablet Take 1,000 mg by mouth 2 times daily as needed      alendronate (FOSAMAX) 70 MG tablet Take 70 mg by mouth every 7 days      allopurinol (ZYLOPRIM) 100 MG tablet Take 100 mg by mouth daily      amLODIPine (NORVASC) 5 MG tablet Take 1 tablet by mouth daily at 2 pm      apixaban ANTICOAGULANT (ELIQUIS ANTICOAGULANT) 5 MG tablet Take 2.5 mg by mouth 2 times daily      atorvastatin (LIPITOR) 40 MG tablet [ATORVASTATIN (LIPITOR) 40 MG TABLET] Take 40 mg by mouth bedtime.      baclofen (LIORESAL) 10 MG tablet [BACLOFEN (LIORESAL) 10 MG TABLET] Take 10 mg by mouth at bedtime.       calcium carbonate (OS-MARCELO) 600 mg calcium (1,500 mg) tablet [CALCIUM CARBONATE (OS-MARCELO) 600 MG CALCIUM (1,500 MG) TABLET] Take 1 tablet by mouth daily.       celecoxib (CELEBREX) 200 MG capsule [CELECOXIB (CELEBREX) 200 MG CAPSULE] Take 200 mg by mouth daily.      cetirizine (ZYRTEC) 10 MG tablet Take 10 mg by mouth daily      CONTOUR NEXT TEST test strip 2 times daily      cyanocobalamin 1000 MCG tablet [CYANOCOBALAMIN 1000 MCG TABLET] Take 1,000 mcg by mouth daily.      hydrOXYzine (ATARAX) 25 MG tablet TAKE 1 TABLET BY MOUTH UP TO 3 TIMES DAILY AS NEEDED FOR ANXIETY      lactobacillus combo no.11 15 billion cell CpSP Take 1 capsule by mouth daily      lisinopril (ZESTRIL) 20 MG tablet TAKE 1 TABLET BY MOUTH TWICE A DAY FOR BLOOD PRESSURE      LORazepam (ATIVAN) 0.5 MG tablet [LORAZEPAM (ATIVAN) 0.5 MG TABLET] Take 0.5 mg by mouth 2 (two) times a day as needed.       magnesium oxide  "(MAG-OX) 400 mg tablet [MAGNESIUM OXIDE (MAG-OX) 400 MG TABLET] Take 400 mg by mouth daily.      metFORMIN (GLUCOPHAGE-XR) 500 MG 24 hr tablet [METFORMIN (GLUCOPHAGE-XR) 500 MG 24 HR TABLET] TAKE 1 TABLET BY MOUTH EVERY DAY FOR DIABETES  1    Microlet Lancets MISC TEST BLOOD SUGAR 2 X'S DAILY DX: E11.9      sertraline (ZOLOFT) 25 MG tablet Take 25 mg by mouth daily      sotalol (BETAPACE) 80 MG tablet Take 1 tablet (80 mg) by mouth 2 times daily 180 tablet 3    vitamin D3 (CHOLECALCIFEROL) 50 mcg (2000 units) tablet Take 1 tablet by mouth daily         Allergies   Allergen Reactions    Seasonal Allergies           Lab Results    Chemistry/lipid CBC Cardiac Enzymes/BNP/TSH/INR   Recent Labs   Lab Test 05/11/23  1000   CHOL 117   HDL 51   LDL 43   TRIG 113     Recent Labs   Lab Test 05/11/23  1000 05/16/22  0950 07/31/20  0923   LDL 43 50 57     Recent Labs   Lab Test 04/11/24  1701 04/11/24  1112 04/11/24  0733   NA  --   --  135   POTASSIUM  --   --  3.6   CHLORIDE  --   --  99   CO2  --   --  24   *   < > 158*   BUN  --   --  4.0*   CR  --   --  0.49*   GFRESTIMATED  --   --  >90   MARCELO  --   --  9.6    < > = values in this interval not displayed.     Recent Labs   Lab Test 04/11/24  0733 04/10/24  2102 03/22/24  1444   CR 0.49* 0.57 0.62     Recent Labs   Lab Test 04/10/24  2102 12/15/21  1209   A1C 6.0* 5.9*          Recent Labs   Lab Test 04/11/24  0733   WBC 7.7   HGB 15.3   HCT 45.2   MCV 92        Recent Labs   Lab Test 04/11/24  0733 04/10/24  2102 03/22/24  1444   HGB 15.3 15.4 15.5    Recent Labs   Lab Test 05/05/22  1845 04/28/22  1416 01/13/21  0954   TROPONINI <0.01 <0.01 <0.01     Recent Labs   Lab Test 04/10/24  2102 02/25/24  2047 02/05/24  1006 05/05/22  1845 04/28/22  1416 05/26/21  1509   BNP  --   --   --  51 53 46   NTBNPI 265 186 3,675*  --   --   --      Recent Labs   Lab Test 04/10/24  2102   TSH 0.65     No results for input(s): \"INR\" in the last 50425 hours.     Gokul " MD [place]

## 2024-07-15 ENCOUNTER — OFFICE VISIT (OUTPATIENT)
Dept: CARDIOLOGY | Facility: CLINIC | Age: 84
End: 2024-07-15
Attending: NURSE PRACTITIONER
Payer: COMMERCIAL

## 2024-07-15 ENCOUNTER — TELEPHONE (OUTPATIENT)
Dept: CARDIOLOGY | Facility: CLINIC | Age: 84
End: 2024-07-15

## 2024-07-15 VITALS
DIASTOLIC BLOOD PRESSURE: 68 MMHG | SYSTOLIC BLOOD PRESSURE: 128 MMHG | HEART RATE: 77 BPM | WEIGHT: 122 LBS | BODY MASS INDEX: 23.83 KG/M2 | RESPIRATION RATE: 14 BRPM

## 2024-07-15 DIAGNOSIS — I48.0 PAROXYSMAL ATRIAL FIBRILLATION (H): ICD-10-CM

## 2024-07-15 PROCEDURE — G2211 COMPLEX E/M VISIT ADD ON: HCPCS | Performed by: INTERNAL MEDICINE

## 2024-07-15 PROCEDURE — 99205 OFFICE O/P NEW HI 60 MIN: CPT | Performed by: INTERNAL MEDICINE

## 2024-07-15 RX ORDER — CHOLECALCIFEROL (VITAMIN D3) 50 MCG
1 TABLET ORAL DAILY
COMMUNITY

## 2024-07-15 RX ORDER — AMLODIPINE BESYLATE 5 MG/1
1 TABLET ORAL
COMMUNITY
Start: 2024-05-17

## 2024-07-15 NOTE — LETTER
7/15/2024    Malcolm Carrington  [address]  [facility] [address]    RE: Adilia Eric       Dear Colleague,     I had the pleasure of seeing Adilia Eric in the [facility] [place] [facility].    HEART CARE ENCOUNTER CONSULTATON NOTE      [facility]  [phone]      Assessment/Recommendations   Assessment/Plan:    Adilia Eric is a very pleasant 83 year old female with atrial fibrillation, hypertension, mild nonobstructive coronary artery disease on angiography in 2013, hyponatremia, type 2 diabetes, remote history of possible TIA, intracranial hemorrhage 2011 without recurrence, abdominal aortic aneurysm (3.3 cm), possible obstructive sleep apnea who presents today to the EP clinic.    Persistent atrial fibrillation  - We reviewed the pathophysiology of atrial fibrillation and management considerations including stroke risk and anticoagulation, rate control, cardioversion, antiarrhythmic drug therapy, and catheter ablation. We discussed atrial fibrillation ablation procedures, anticipated success rates, the potential need for re-do ablation vs addition of anti-arrhythmic drugs, procedural risks (including groin bleeding, tamponade, phrenic or esophageal injury, stroke, pulmonary vein stenosis) and recovery expectations.  -Currently on sotalol, she would like to think about an ablation  - she is currently moving into an assisted living facility, so would like a call in a month to discuss whether she would like to pursue an AF ablation  - we discussed the ongoing importance of lifestyle modification (maintaining a healthy weight, sleep apnea diagnosis and management, alcohol avoidance) as part of a long term strategy for atrial fibrillation management    2. Anticoagulation  -Continue Eliquis 2.5 mg twice daily    3.  Hypertension  -Well-controlled  -Continue current medications    4.  HFpEF  -Euvolemic on exam today    Time spent: 60 minutes spent on the date of the encounter doing chart  review, history and exam, documentation and further activities as noted above.    The longitudinal plan of care for the condition(s) below were addressed during this visit. Due to the added complexity in care, I will continue support in the subsequent management of this condition(s) and with the ongoing continuity of care of this condition(s).          History of Present Illness/Subjective    HPI: Adilia Eric is a very pleasant 83 year old female with atrial fibrillation, hypertension, mild nonobstructive coronary artery disease on angiography in 2013, hyponatremia, type 2 diabetes, remote history of possible TIA, intracranial hemorrhage 2011 without recurrence, abdominal aortic aneurysm (3.3 cm), possible obstructive sleep apnea who presents today to the EP clinic.    Adilia was diagnosed with atrial fibrillation in January 2023. She was seen in the ED on 2/5/2024 for progressive shortness of breath, wheezing, and fatigue for a little over a week. She was noted to be in AF-RVR. Rates did not slow with metoprolol, so she underwent urgent cardioversion.    She is currently on sotalol 80 mg twice daily and Eliquis 2.5 mg twice daily for anticoagulation.    She has not had any Afib since the February 2024.    She is currently moving into an assisted living facility, so would like a call in a month to discuss whether she would like to pursue an AF ablation    Recent ECG(personally reviewed):      ECHO done 2/17/2023:  Left ventricular size, wall motion and function are normal. The ejection  fraction is 55-60%.  Normal right ventricle size and systolic function.  Both atria are of normal size.  No hemodynamically significant valvular abnormalities on 2D or color flow  imaging.  There is no comparison study available.     NM stress test done 5/18/2022:    Lexiscan stress ECG negative for ischemia.    The nuclear stress test is negative for inducible myocardial ischemia or infarction.    The left ventricular ejection  fraction at stress is greater than 70%.    A prior study was conducted on 4/16/2019.  No interval change.    Labs below reviewed personally     Physical Examination  Review of Systems   Vitals: /68 (BP Location: Right arm, Patient Position: Sitting, Cuff Size: Adult Regular)   Pulse 77   Resp 14   Wt 55.3 kg (122 lb)   BMI 23.83 kg/m    BMI= Body mass index is 23.83 kg/m .  Wt Readings from Last 3 Encounters:   07/15/24 55.3 kg (122 lb)   04/15/24 56.7 kg (125 lb)   04/10/24 58.5 kg (129 lb)       General Appearance:   no distress, normal body habitus   ENT/Mouth: membranes moist, no oral lesions or bleeding gums.      EYES:  no scleral icterus, normal conjunctivae   Neck: no carotid bruits or thyromegaly   Chest/Lungs:   lungs are clear to auscultation, no rales or wheezing, no sternal scar, equal chest wall expansion    Cardiovascular:   Regular. Normal first and second heart sounds with no murmurs, rubs, or gallops; the carotid, radial and posterior tibial pulses are intact, no edema bilaterally    Abdomen:  no organomegaly, masses, bruits, or tenderness; bowel sounds are present   Extremities: no cyanosis or clubbing   Skin: no xanthelasma, warm.    Neurologic: normal  bilateral, no tremors     Psychiatric: alert and oriented x3, calm        Please refer above for cardiac ROS details.        Medical History  Surgical History Family History Social History   Past Medical History:   Diagnosis Date    Depression     Diabetes mellitus (H)     GERD (gastroesophageal reflux disease)     Hyperlipemia     Hypertension     Intracranial hemorrhage (H) 2011    may be amyloid angiopathy; has been stable.    Mastoiditis 2011    Osteoarthritis     TMJ (temporomandibular joint disorder)      Past Surgical History:   Procedure Laterality Date    CYST REMOVAL      tongue    HYSTERECTOMY  1996    OOPHORECTOMY Bilateral 1996     Family History   Problem Relation Age of Onset    Heart Disease Father     No Known  Problems Mother     No Known Problems Sister     No Known Problems Daughter     No Known Problems Maternal Grandmother     No Known Problems Maternal Grandfather     No Known Problems Paternal Grandmother     No Known Problems Paternal Grandfather     No Known Problems Maternal Aunt     No Known Problems Paternal Aunt     Hereditary Breast and Ovarian Cancer Syndrome No family hx of     Breast Cancer No family hx of     Cancer No family hx of     Colon Cancer No family hx of     Endometrial Cancer No family hx of     Ovarian Cancer No family hx of         Social History     Socioeconomic History    Marital status:      Spouse name: Not on file    Number of children: Not on file    Years of education: Not on file    Highest education level: Not on file   Occupational History    Not on file   Tobacco Use    Smoking status: Former    Smokeless tobacco: Never   Substance and Sexual Activity    Alcohol use: Yes     Alcohol/week: 1.7 standard drinks of alcohol    Drug use: No    Sexual activity: Not on file   Other Topics Concern    Not on file   Social History Narrative    Not on file     Social Determinants of Health     Financial Resource Strain: Low Risk  (12/29/2023)    [facility] [facility]    Financial Resource Strain     Difficulty of Paying Living Expenses: 3     Difficulty of Paying Living Expenses: Not on file   Food Insecurity: No Food Insecurity (12/29/2023)    Received from [employer] [facility], [facility]    Food Insecurity     Worried About Running Out of Food in the Last Year: 1   Transportation Needs: No Transportation Needs (12/29/2023)    Received from [employer] [facility], [facility] [employer] [facility]    Transportation Needs     Lack of Transportation (Medical): 1   Physical Activity: Not on file   Stress: Not on  file   Social Connections: Socially Integrated (12/29/2023)    [facility], [facility]    Social Connections     Frequency of Communication with Friends and Family: 0   Interpersonal Safety: Not on file   Housing Stability: Low Risk  (12/29/2023)    Received from [facility], [facility]    Housing Stability     Unable to Pay for Housing in the Last Year: 1           Medications  Allergies   Current Outpatient Medications   Medication Sig Dispense Refill    acetaminophen (TYLENOL) 500 MG tablet Take 1,000 mg by mouth 2 times daily as needed      alendronate (FOSAMAX) 70 MG tablet Take 70 mg by mouth every 7 days      allopurinol (ZYLOPRIM) 100 MG tablet Take 100 mg by mouth daily      amLODIPine (NORVASC) 5 MG tablet Take 1 tablet by mouth daily at 2 pm      apixaban ANTICOAGULANT (ELIQUIS ANTICOAGULANT) 5 MG tablet Take 2.5 mg by mouth 2 times daily      atorvastatin (LIPITOR) 40 MG tablet [ATORVASTATIN (LIPITOR) 40 MG TABLET] Take 40 mg by mouth bedtime.      baclofen (LIORESAL) 10 MG tablet [BACLOFEN (LIORESAL) 10 MG TABLET] Take 10 mg by mouth at bedtime.       calcium carbonate (OS-MARCELO) 600 mg calcium (1,500 mg) tablet [CALCIUM CARBONATE (OS-MARCELO) 600 MG CALCIUM (1,500 MG) TABLET] Take 1 tablet by mouth daily.       celecoxib (CELEBREX) 200 MG capsule [CELECOXIB (CELEBREX) 200 MG CAPSULE] Take 200 mg by mouth daily.      cetirizine (ZYRTEC) 10 MG tablet Take 10 mg by mouth daily      CONTOUR NEXT TEST test strip 2 times daily      cyanocobalamin 1000 MCG tablet [CYANOCOBALAMIN 1000 MCG TABLET] Take 1,000 mcg by mouth daily.      hydrOXYzine (ATARAX) 25 MG tablet TAKE 1 TABLET BY MOUTH UP TO 3 TIMES DAILY AS NEEDED FOR ANXIETY      lactobacillus combo no.11 15 billion cell CpSP Take 1 capsule by mouth daily      lisinopril (ZESTRIL) 20 MG tablet TAKE 1 TABLET BY MOUTH  TWICE A DAY FOR BLOOD PRESSURE      LORazepam (ATIVAN) 0.5 MG tablet [LORAZEPAM (ATIVAN) 0.5 MG TABLET] Take 0.5 mg by mouth 2 (two) times a day as needed.       magnesium oxide (MAG-OX) 400 mg tablet [MAGNESIUM OXIDE (MAG-OX) 400 MG TABLET] Take 400 mg by mouth daily.      metFORMIN (GLUCOPHAGE-XR) 500 MG 24 hr tablet [METFORMIN (GLUCOPHAGE-XR) 500 MG 24 HR TABLET] TAKE 1 TABLET BY MOUTH EVERY DAY FOR DIABETES  1    Microlet Lancets MISC TEST BLOOD SUGAR 2 X'S DAILY DX: E11.9      sertraline (ZOLOFT) 25 MG tablet Take 25 mg by mouth daily      sotalol (BETAPACE) 80 MG tablet Take 1 tablet (80 mg) by mouth 2 times daily 180 tablet 3    vitamin D3 (CHOLECALCIFEROL) 50 mcg (2000 units) tablet Take 1 tablet by mouth daily         Allergies   Allergen Reactions    Seasonal Allergies           Lab Results    Chemistry/lipid CBC Cardiac Enzymes/BNP/TSH/INR   Recent Labs   Lab Test 05/11/23  1000   CHOL 117   HDL 51   LDL 43   TRIG 113     Recent Labs   Lab Test 05/11/23  1000 05/16/22  0950 07/31/20  0923   LDL 43 50 57     Recent Labs   Lab Test 04/11/24  1701 04/11/24  1112 04/11/24  0733   NA  --   --  135   POTASSIUM  --   --  3.6   CHLORIDE  --   --  99   CO2  --   --  24   *   < > 158*   BUN  --   --  4.0*   CR  --   --  0.49*   GFRESTIMATED  --   --  >90   MARCELO  --   --  9.6    < > = values in this interval not displayed.     Recent Labs   Lab Test 04/11/24  0733 04/10/24  2102 03/22/24  1444   CR 0.49* 0.57 0.62     Recent Labs   Lab Test 04/10/24  2102 12/15/21  1209   A1C 6.0* 5.9*          Recent Labs   Lab Test 04/11/24  0733   WBC 7.7   HGB 15.3   HCT 45.2   MCV 92        Recent Labs   Lab Test 04/11/24  0733 04/10/24  2102 03/22/24  1444   HGB 15.3 15.4 15.5    Recent Labs   Lab Test 05/05/22  1845 04/28/22  1416 01/13/21  0954   TROPONINI <0.01 <0.01 <0.01     Recent Labs   Lab Test 04/10/24  2102 02/25/24  2047 02/05/24  1006 05/05/22  1845 04/28/22  1416 05/26/21  1509   BNP  --   --   --  51  "53 46   NTBNPI 265 186 3,675*  --   --   --      Recent Labs   Lab Test 04/10/24  2102   TSH 0.65     No results for input(s): \"INR\" in the last 43125 hours.     Gokul Max MD    Thank you for allowing me to participate in the care of your patient.      Sincerely,     Gokul Max MD     [facility]  cc:   PARVIN Gunn CNP  [address], [address]  [place] [address]      "

## 2024-07-15 NOTE — PATIENT INSTRUCTIONS
[facility]  Cardiac Electrophysiology  [address]  [place] [address]   Office: [phone]  Fax: [phone]       Thank you for seeing us in clinic today - it is a pleasure to be a part of your care team.  Below is a summary of our plan from today's visit.       You have persistent atrial fibrillation, presently being managed with Sotalol  We reviewed physiology and management options including antiarrhythmic drug therapy, catheter ablation and lifestyle modification.  We will plan for the following:  - consider options further, and let us know with any questions or decision as to how you would like to proceed  - continue Sotalol for now  - continue Eliquis.     Please do not hesitate to be in touch with our office at [phone] with any questions that may arise.       Thank you for trusting us with your care,    Gokul Max MD  Clinical Cardiac Electrophysiology  [facility]  [address]  [place] [address]   Office: [phone]  Fax: [phone]                 ATRIAL FIBRILLATION: Patient Information     What is atrial fibrillation?  Atrial fibrillation (AF, A-fib) is a common heart rhythm problem (arrhythmia) occurring within the upper chambers of the heart (the atria).  In normal rhythm, the upper and lower chambers of the heart are electrically driven to contract in a coordinated sequence.  In atrial fibrillation, the atria lose their ability to contract because of rapid and chaotic electrical activity.  The lower chambers of the heart (the ventricles) continue to pump blood throughout the body, though with irregular and often faster rate due to the chaotic activity within the atria.          How do I know if I have atrial fibrillation?   Some people may feel their heart beating faster, harder, or irregularly while in atrial fibrillation.  Others may be lightheaded, fatigued, feel weak or tired or become more short  of breath especially with activities.  Some patients have no symptoms at all.  Atrial fibrillation may be found due to an irregular pulse or on an electrocardiogram (ECG). Atrial fibrillation can start and stop on its own, and episodes can last from seconds to several months.       How common is atrial fibrillation?   An estimated 3-6 million people in the [place] have atrial fibrillation.  Atrial fibrillation is a common heart rhythm problem for older persons, affecting as estimated 12-15% of people over the age of 65 years of age.     What causes atrial fibrillation?   Age is the most important risk factor for atrial fibrillation.  Atrial fibrillation is more common in people with other heart disease, high blood pressure, diabetes, obesity, sleep apnea and in people who regularly consume alcohol.  Surgery, lung disease, or thyroid problems can lead to atrial fibrillation.  Atrial fibrillation has multiple possible causes, and in most cases a single cause cannot be found.  Atrial fibrillation is a progressive condition, usually starting with at an early stage with short and infrequent episodes.  In later stages of disease, more frequent and longer lasting episodes of atrial fibrillation occur, ultimately culminating in episodes which do not spontaneously terminate.  Generally, more enlargement and scarring within the upper chambers of the heart is observed as atrial fibrillation progresses from early to late-stage disease.     How is atrial fibrillation diagnosed and evaluated?    Because of its start-stop nature, atrial fibrillation can be challenging to diagnose.  Atrial fibrillation is most commonly diagnosed via cardiac rhythm recordings - either an ECG or wearable cardiac rhythm monitor.  For patients with pacemakers, defibrillators or implantable loop recorders, atrial fibrillation may be recorded via these devices.  Recently, commercially available devices (eg. Apple Watch, Kardia device, certain  Fit[employer]ceana devices, others) can allow patients to take 30 second cardiac rhythm recordings which may document atrial fibrillation.  Once atrial fibrillation is diagnosed, additional tests include blood tests and an echocardiogram.  The echocardiogram uses ultrasound to look at your heart to assess your cardiac function and evaluate for other heart disease.  Additional evaluation may include CT or MRI studies.     Is atrial fibrillation dangerous?   Atrial fibrillation is not usually a life-threatening arrhythmia.  The most serious consequences of atrial fibrillation including stroke and worsening of overall cardiac function.  While in atrial fibrillation, the upper cardiac chambers do not contract normally, resulting in slower blood flow and increased risk of clot formation.  If this blood clot becomes detached from the heart a stroke can occur.  Unfortunately, stroke can be the first sign of atrial fibrillation for some people.  With a stroke, you may notice abnormal sensation, weakness on one side of the body or face, changes in your vision or speech.  If you have any of these signs, you should contact EMS and be evaluated in an emergency room as soon as possible.       How is atrial fibrillation treated?     Several treatment options exist for suppressing atrial fibrillation - however, it is not an easily curable arrhythmia.  The first goal in managing atrial fibrillation is to minimize stroke risk.  The second goal is to improve symptoms associated with atrial fibrillation.  Finally, in patients with reduced cardiac function, maintaining normal rhythm can help improve cardiac function.       Blood thinners are used to reduce the risk of stroke in patients with high estimated stroke risk related to atrial fibrillation.  For patients at higher risk of bleeding related to blood thinner use, implantable devices may be an option to reduce stroke risk without the need for long term blood thinner use.       Atrial  fibrillation can be managed via two strategies: rate control and rhythm control.  In a rate control strategy, continued atrial fibrillation is accepted and medications (eg. beta-blockers or calcium channel blockers) are used to control the lower chamber rate.  In a rhythm control strategy, anti-arrhythmic medications or catheter ablation are used to maintain normal cardiac rhythm and slow disease progression by suppressing atrial fibrillation.  A procedure called a cardioversion, in which an electric shock is delivered through patches placed on the chest wall while under deep sedation, can be performed to temporarily restore normal cardiac rhythm, though does not address the chance of atrial fibrillation recurrence.  Treatments are more effective for earlier rather than later stage atrial fibrillation.  Lifestyle modifications (maintaining a healthy weight, aerobic exercise, diagnosing and treating sleep apnea, and minimizing alcohol intake) are important elements of atrial fibrillation rhythm control.      What is catheter ablation for atrial fibrillation?  Cardiac catheter ablation is a commonly performed, minimally invasive procedure performed by a cardiac electrophysiologist to treat many different cardiac rhythm abnormalities.  During catheter ablation, long, thin, flexible tubes are advanced into the heart via small sheaths inserted into the femoral veins and thermal energy (either heating or cooling) is applied within the heart to disrupt abnormal electrical activity.  Atrial fibrillation ablation is performed under general anesthesia, with procedures generally taking approximately 2-3 hours.  Patients are typically observed for 3-5 hours after the ablation, and in most cases can be discharged home the same day.  Atrial fibrillation ablation is associated with better outcomes (mortality, cardiovascular hospitalizations, atrial arrhythmia recurrences) compared to antiarrhythmic drug therapy.  However, atrial  fibrillation recurrences are not uncommon, and repeat catheter ablation may be offered.  Your electrophysiology team can review atrial fibrillation ablation, anticipated success rates, risks, and recovery expectations with you.     What are anti-arrhythmic medications?  Anti-arrhythmic medications are specialized drugs which alter cardiac electrical functioning to suppress arrhythmias.  There are several anti-arrhythmic medications available, each with its own success rate and side effects.  Some anti-arrhythmic medications are less effective though safer to use, others are more effective though have serious potential toxicities.  Atrial fibrillation recurrences are common and may require dose adjustment or change in antiarrhythmic therapy.  Your electrophysiology team will carefully consider which medication would be the best and safest for your particular case.       Can I live a normal life?    The goal of atrial fibrillation management is for patients to live normal lives without being limited by symptoms related to atrial fibrillation.     Are any additional educational resources available?  There are a number of excellent atrial fibrillation education resources available to you online.  A few options you may wish to review include:  hrsonline.org/guide-atrial-fibrillation  afibmatters.org  getsmartaboutafib.com  stopaf.com     What comes next?    Consider your management options and let us know how we can help in your decision process.  Please take medications as they have been prescribed.  You should also get any tests that may have been ordered for you.       When to Call Your Doctor or seek emergency care:  Call your doctor or seek emergency care if you have any significant changes with the following:  Weakness  Dizziness  Fainting  Fatigue  Shortness of breath  Chest pain with increased activity  If you are concerned that your heart rate is too fast or too slow  Bleeding that does not stop in 10  minutes  Coughing or throwing up blood  Bloody diarrhea or bleeding hemorrhoids  Dark-colored urine or black stool  Allergic reactions:  Rash  Itching  Swelling  Trouble breathing or swallowing        Please call the [facility] at [phone] if you have concerns about your symptoms, your medicines, or your follow-up appointments.

## 2024-07-15 NOTE — TELEPHONE ENCOUNTER
----- Message from Gokul Max sent at 7/15/2024 10:42 AM CDT -----  Hi team, please call Adilia's son Manuel([phone] in a MONTH to check if she would like to pursue an AF ablation.    If yes    General Anesthesia  CARTO mapping system  Hold Sotalol 3 days prior to ablation  Continue anticoagulation  CBC, BMP  on day of procedure      Thanks  KA

## 2024-07-16 ENCOUNTER — TELEPHONE (OUTPATIENT)
Dept: CARDIOLOGY | Facility: CLINIC | Age: 84
End: 2024-07-16
Payer: COMMERCIAL

## 2024-07-16 DIAGNOSIS — I48.0 PAROXYSMAL ATRIAL FIBRILLATION (H): Primary | ICD-10-CM

## 2024-07-16 NOTE — TELEPHONE ENCOUNTER
Health Call Center    Phone Message    May a detailed message be left on voicemail: yes     Reason for Call: Medication Refill Request    Has the patient contacted the pharmacy for the refill? Yes   Name of medication being requested: ELIQUIS   Pharmacy: [id number] - [place], [place] - [address]   Date medication is needed: 7/16/24    Action Taken: Message routed to:  Other: Cardiology    Travel Screening: Not Applicable     Thank you!  [facility]

## 2024-07-21 ENCOUNTER — HEALTH MAINTENANCE LETTER (OUTPATIENT)
Age: 84
End: 2024-07-21

## 2024-08-15 NOTE — TELEPHONE ENCOUNTER
Spoke with son Manuel [phone]. He reports pt has not given much thought to ablation and has other more pressing concerns to be addressed. He will speak with her about this and if pt would like to proceed he will call back to EP RNs.    Viola Gerer RN

## 2024-08-19 ENCOUNTER — DOCUMENTATION ONLY (OUTPATIENT)
Dept: CARDIOLOGY | Facility: CLINIC | Age: 84
End: 2024-08-19
Payer: COMMERCIAL

## 2024-08-19 ENCOUNTER — PREP FOR PROCEDURE (OUTPATIENT)
Dept: CARDIOLOGY | Facility: CLINIC | Age: 84
End: 2024-08-19
Payer: COMMERCIAL

## 2024-08-19 DIAGNOSIS — I48.0 PAROXYSMAL ATRIAL FIBRILLATION (H): Primary | ICD-10-CM

## 2024-08-19 RX ORDER — FENTANYL CITRATE 50 UG/ML
25 INJECTION, SOLUTION INTRAMUSCULAR; INTRAVENOUS
OUTPATIENT
Start: 2024-08-19

## 2024-08-19 RX ORDER — LIDOCAINE 40 MG/G
CREAM TOPICAL
OUTPATIENT
Start: 2024-08-19

## 2024-08-19 RX ORDER — SODIUM CHLORIDE 9 MG/ML
100 INJECTION, SOLUTION INTRAVENOUS CONTINUOUS
OUTPATIENT
Start: 2024-08-19

## 2024-08-19 NOTE — PROGRESS NOTES
H&P:  Card OV  Date:  EP PADMINI [] PVI  Order Case Req Y  Order Set Y Lab/EKG   Orders [] Imaging Order None     AC Eliquis-CONTINUE   AAD Sotalol-Hold 3 days prior   PPI/H2 Blocker Start Protonix 40mg Daily 3 days prior, 6wk post   Diuretics None   DM/GLP-1 DM Meds-  metformin  GLP-1- None     Adilia Eric, 1940, [id number]  Home:[phone] (home) Cell:[phone] (mobile)  Emergency Contact: Manuel Eric   PCP: Malcolm Carrington, [phone]    Important patient information for CSC/Cath Lab staff : None    [facility] EP Cath Lab Procedure Order   Ablation Type:  Atrial Fibrillation (Paroxsymal)  Ordering Provider: Dr Max  Date Ordered and Prepped: 8/19/2024 Chel Ram RN    Scheduling Information:  Anticipated Case Duration:  Standard ( Case per day SA 2:1, DW 5:1, KA 3:1)   Scheduling Timeframe:  Next Available  Scheduling Restrictions: None  Scheduling Contact: Please contact pt to schedule, if you are unable to schedule date within the next 24 hours please contact pt to update on scheduling process  EP RN Follow Up Apt: Schedule EP RN PC visit 3-4 days s/p PVI  MD Preference: Scheduling with ordering provider  Current Device/Device Co Needed for Procedure: None NoneNone  Pre-Procedural Testing needed: None  Mapping System Required:  Carto (Dr Shell and Dr Max)  ICE Needed:  Yes  Anesthesia:General Anesthesia    [facility] EP Cath Lab Prep   H&P:  Schedule H&P with EP PADMINI, RN Teach, and Labs within 30 days of PVI  Pre-op Labs: CBC, BMP, Beta HcG if appropriate, and INR if on Warfarin will be ordered AM of procedure, if not completed at pre-op H&P within 7 days of procedure.  T&S Pre-Procedure Review: Does not need for PVI procedures  Medical Records Pertinent for Procedure:   2/17/23 Echo EF 55-60%  Iodinated Contrast Dye Allergies (Does not include Shellfish, Egg, and/or Iodine Allergy): NA  GLP-1 Protocol: If on Dulaglutide (Trulicity) (weekly)- Injection hold 7 days prior to procedure  ,  Exenatide extended release (Bydureon bcise) (weekly)- Injection hold 7 days prior to procedure, Exenatide (Byetta) (twice daily)- Oral Tablet hold day prior and morning of procedure and for Injection hold 7 days prior to procedure, Semaglutide (Ozempic) (weekly)- Injection and Oral hold 7 days prior to procedure, Liraglutide (Victoza, Saxenda) (daily)- Injection hold day prior and morning of procedure  Follow Up S/P: EP RN 3-4 PC Visit and EP PADMINI 6wk (To be scheduled at time of case scheduling)    Allergies   Allergen Reactions    Seasonal Allergies        Current Outpatient Medications:     acetaminophen (TYLENOL) 500 MG tablet, Take 1,000 mg by mouth 2 times daily as needed, Disp: , Rfl:     alendronate (FOSAMAX) 70 MG tablet, Take 70 mg by mouth every 7 days, Disp: , Rfl:     allopurinol (ZYLOPRIM) 100 MG tablet, Take 100 mg by mouth daily, Disp: , Rfl:     amLODIPine (NORVASC) 5 MG tablet, Take 1 tablet by mouth daily at 2 pm, Disp: , Rfl:     apixaban ANTICOAGULANT (ELIQUIS ANTICOAGULANT) 2.5 MG tablet, Take 1 tablet (2.5 mg) by mouth 2 times daily, Disp: 180 tablet, Rfl: 2    atorvastatin (LIPITOR) 40 MG tablet, [ATORVASTATIN (LIPITOR) 40 MG TABLET] Take 40 mg by mouth bedtime., Disp: , Rfl:     baclofen (LIORESAL) 10 MG tablet, [BACLOFEN (LIORESAL) 10 MG TABLET] Take 10 mg by mouth at bedtime. , Disp: , Rfl:     calcium carbonate (OS-MARCELO) 600 mg calcium (1,500 mg) tablet, [CALCIUM CARBONATE (OS-MARCELO) 600 MG CALCIUM (1,500 MG) TABLET] Take 1 tablet by mouth daily. , Disp: , Rfl:     celecoxib (CELEBREX) 200 MG capsule, [CELECOXIB (CELEBREX) 200 MG CAPSULE] Take 200 mg by mouth daily., Disp: , Rfl:     cetirizine (ZYRTEC) 10 MG tablet, Take 10 mg by mouth daily, Disp: , Rfl:     CONTOUR NEXT TEST test strip, 2 times daily, Disp: , Rfl:     cyanocobalamin 1000 MCG tablet, [CYANOCOBALAMIN 1000 MCG TABLET] Take 1,000 mcg by mouth daily., Disp: , Rfl:     hydrOXYzine (ATARAX) 25 MG tablet, TAKE 1 TABLET BY MOUTH UP  TO 3 TIMES DAILY AS NEEDED FOR ANXIETY, Disp: , Rfl:     lactobacillus combo no.11 15 billion cell CpSP, Take 1 capsule by mouth daily, Disp: , Rfl:     lisinopril (ZESTRIL) 20 MG tablet, TAKE 1 TABLET BY MOUTH TWICE A DAY FOR BLOOD PRESSURE, Disp: , Rfl:     LORazepam (ATIVAN) 0.5 MG tablet, [LORAZEPAM (ATIVAN) 0.5 MG TABLET] Take 0.5 mg by mouth 2 (two) times a day as needed. , Disp: , Rfl:     magnesium oxide (MAG-OX) 400 mg tablet, [MAGNESIUM OXIDE (MAG-OX) 400 MG TABLET] Take 400 mg by mouth daily., Disp: , Rfl:     metFORMIN (GLUCOPHAGE-XR) 500 MG 24 hr tablet, [METFORMIN (GLUCOPHAGE-XR) 500 MG 24 HR TABLET] TAKE 1 TABLET BY MOUTH EVERY DAY FOR DIABETES, Disp: , Rfl: 1    Microlet Lancets MISC, TEST BLOOD SUGAR 2 X'S DAILY DX: E11.9, Disp: , Rfl:     sertraline (ZOLOFT) 25 MG tablet, Take 25 mg by mouth daily, Disp: , Rfl:     sotalol (BETAPACE) 80 MG tablet, Take 1 tablet (80 mg) by mouth 2 times daily, Disp: 180 tablet, Rfl: 3    vitamin D3 (CHOLECALCIFEROL) 50 mcg (2000 units) tablet, Take 1 tablet by mouth daily, Disp: , Rfl:     Documentation Date:8/19/2024 1:49 PM  Chel Ram RN

## 2024-08-19 NOTE — TELEPHONE ENCOUNTER
Anu Strauss  P [facility] Ep Support Pool - [facility]; P [facility] Procedure  Pool - [facility]  Caller: Unspecified (Today,  1:37 PM)  Adilia has decided she would like to move forward with the ablation.  Please call pt to schedule.  Thank you!

## 2024-08-21 DIAGNOSIS — I48.0 PAROXYSMAL ATRIAL FIBRILLATION (H): Primary | ICD-10-CM

## 2024-10-04 ENCOUNTER — LAB (OUTPATIENT)
Dept: CARDIOLOGY | Facility: CLINIC | Age: 84
End: 2024-10-04
Payer: COMMERCIAL

## 2024-10-04 ENCOUNTER — OFFICE VISIT (OUTPATIENT)
Dept: CARDIOLOGY | Facility: CLINIC | Age: 84
End: 2024-10-04
Payer: COMMERCIAL

## 2024-10-04 ENCOUNTER — ALLIED HEALTH/NURSE VISIT (OUTPATIENT)
Dept: CARDIOLOGY | Facility: CLINIC | Age: 84
End: 2024-10-04
Payer: COMMERCIAL

## 2024-10-04 VITALS
WEIGHT: 122 LBS | RESPIRATION RATE: 16 BRPM | HEART RATE: 72 BPM | BODY MASS INDEX: 23.95 KG/M2 | SYSTOLIC BLOOD PRESSURE: 138 MMHG | DIASTOLIC BLOOD PRESSURE: 60 MMHG | HEIGHT: 60 IN

## 2024-10-04 DIAGNOSIS — I50.31 ACUTE HEART FAILURE WITH PRESERVED EJECTION FRACTION (H): ICD-10-CM

## 2024-10-04 DIAGNOSIS — I48.0 PAROXYSMAL ATRIAL FIBRILLATION (H): Primary | ICD-10-CM

## 2024-10-04 DIAGNOSIS — I48.0 PAROXYSMAL ATRIAL FIBRILLATION (H): ICD-10-CM

## 2024-10-04 DIAGNOSIS — I10 PRIMARY HYPERTENSION: ICD-10-CM

## 2024-10-04 LAB
ANION GAP SERPL CALCULATED.3IONS-SCNC: 12 MMOL/L (ref 7–15)
ATRIAL RATE - MUSE: 72 BPM
BUN SERPL-MCNC: 9.5 MG/DL (ref 8–23)
CALCIUM SERPL-MCNC: 9.6 MG/DL (ref 8.8–10.4)
CHLORIDE SERPL-SCNC: 94 MMOL/L (ref 98–107)
CREAT SERPL-MCNC: 0.53 MG/DL (ref 0.51–0.95)
DIASTOLIC BLOOD PRESSURE - MUSE: NORMAL MMHG
EGFRCR SERPLBLD CKD-EPI 2021: >90 ML/MIN/1.73M2
ERYTHROCYTE [DISTWIDTH] IN BLOOD BY AUTOMATED COUNT: 13.1 % (ref 10–15)
GLUCOSE SERPL-MCNC: 123 MG/DL (ref 70–99)
HCO3 SERPL-SCNC: 23 MMOL/L (ref 22–29)
HCT VFR BLD AUTO: 46.9 % (ref 35–47)
HGB BLD-MCNC: 16 G/DL (ref 11.7–15.7)
INTERPRETATION ECG - MUSE: NORMAL
MCH RBC QN AUTO: 31.1 PG (ref 26.5–33)
MCHC RBC AUTO-ENTMCNC: 34.1 G/DL (ref 31.5–36.5)
MCV RBC AUTO: 91 FL (ref 78–100)
P AXIS - MUSE: 46 DEGREES
PLATELET # BLD AUTO: 287 10E3/UL (ref 150–450)
POTASSIUM SERPL-SCNC: 4.3 MMOL/L (ref 3.4–5.3)
PR INTERVAL - MUSE: 134 MS
QRS DURATION - MUSE: 70 MS
QT - MUSE: 386 MS
QTC - MUSE: 422 MS
R AXIS - MUSE: -25 DEGREES
RBC # BLD AUTO: 5.14 10E6/UL (ref 3.8–5.2)
SODIUM SERPL-SCNC: 129 MMOL/L (ref 135–145)
SYSTOLIC BLOOD PRESSURE - MUSE: NORMAL MMHG
T AXIS - MUSE: 13 DEGREES
VENTRICULAR RATE- MUSE: 72 BPM
WBC # BLD AUTO: 9.4 10E3/UL (ref 4–11)

## 2024-10-04 PROCEDURE — 93000 ELECTROCARDIOGRAM COMPLETE: CPT | Performed by: INTERNAL MEDICINE

## 2024-10-04 PROCEDURE — G2211 COMPLEX E/M VISIT ADD ON: HCPCS | Performed by: NURSE PRACTITIONER

## 2024-10-04 PROCEDURE — 80048 BASIC METABOLIC PNL TOTAL CA: CPT

## 2024-10-04 PROCEDURE — 99207 PR NO CHARGE NURSE ONLY: CPT

## 2024-10-04 PROCEDURE — 36415 COLL VENOUS BLD VENIPUNCTURE: CPT

## 2024-10-04 PROCEDURE — 99214 OFFICE O/P EST MOD 30 MIN: CPT | Performed by: NURSE PRACTITIONER

## 2024-10-04 PROCEDURE — 85027 COMPLETE CBC AUTOMATED: CPT

## 2024-10-04 RX ORDER — PANTOPRAZOLE SODIUM 40 MG/1
40 TABLET, DELAYED RELEASE ORAL DAILY
Qty: 45 TABLET | Refills: 0 | Status: SHIPPED | OUTPATIENT
Start: 2024-10-11

## 2024-10-04 NOTE — PROGRESS NOTES
HEART CARE ELECTROPHYSIOLOGY NOTE      [facility]  [phone]      Assessment/Recommendations   Assessment/Plan:  1.  Paroxysmal Atrial Fibrillation:  Initially diagnosed 1/2023.  Symptoms consist of fatigue, progressive shortness of breath, acute HFpEF.  Failed antiarrhythmic therapy with sotalol due to side effects of shortness of breath.  She is scheduled for pulmonary vein isolation ablation with Dr. Max on 10/14/2024.  We discussed ablation, <1-2% risk for complication, expected recovery, and follow-up.  She was instructed to hold sotalol 3 days prior to ablation.  Start pantoprazole 40 mg daily 3 days prior to ablation, to continue for 6 weeks after ablation.  She states that her questions were answered to her satisfaction and she is ready to proceed.    She had a brief COVID exposure on Sunday.  Getting a COVID test today.     She has a [id number] score of 9 for age >75, female gender, HTN, HF, CAD, DM 2, TIA.  She also has an increased risk for bleed due to gait instability and history of SAH.  HAS-BLED score 3 for age, TIA, bleeding issue.  She may be a candidate for left atrial appendage closure.  Continue Eliquis 2.5 mg twice daily for stroke prophylaxis (age >80, weight <60 kg).     2.  Acute heart failure with preserved ejection fraction: Secondary to RVR.  Compensated, no fluid retention on exam or heart failure symptoms.        3.  Hypertension: Controlled with amlodipine, lisinopril, sotalol    Telephone follow up with EP RN on 10/17/2024  Follow-up with me 11/25/2024, 6 weeks post PVI     History of Present Illness/Subjective    HPI: Adilia Eric is a 83 year old female who comes in today accompanied by her grandson for EP follow up of atrial fibrillation and history and physical prior to pulmonary vein isolation ablation.   She has a history of paroxysmal atrial fibrillation, hypertension, mild nonobstructive coronary artery disease on angiography in 2013,  hyponatremia, type 2 diabetes, remote history of possible TIA, intracranial hemorrhage 2011 without recurrence, abdominal aortic aneurysm (3.3 cm), possible obstructive sleep apnea.     Hospitalized 4/10/2024 for dehydration secondary to ongoing diarrhea.  Diagnosed with diverticulitis and started on antibiotics.  During her brief hospitalization, she had sinus tachycardia due to dehydration which resolved, but no A-fib.    Arrhythmia history  Dx/date: Paroxysmal atrial fibrillation diagnosed January 2023.  She was seen in the ED on 2/5/2024 for progressive shortness of breath, wheezing, and fatigue for a little over a week.  She was noted to be in AF-RVR.  Rates did not slow with metoprolol, so she underwent urgent cardioversion.  Sx: Fatigue, progressive shortness of breath, acute HFpEF  [id number] score: 9 for age >75, female gender, HTN, HF, CAD, DM 2, TIA  HAS-BLED score 3 for age, TIA, bleeding issue.  Oral anticoagulation: Eliquis 2.5 mg twice daily, dose adjusted for age >80 and weight <60 kg  Antiarrhythmic medications, AV caitlyn blocking agents: Sotalol 80 mg twice daily, side effects-SOB (2/10/2024-present)  Procedures  DCCV: 2/5/2024 (ED)  Ablation: N/A     Adilia states she feels pretty well, though is having neck pain for which she is undergoing PT.  Her pulse has continued to be regular, no known episodes of A-fib, but worsened shortness of breath since starting sotalol.  She has a baseline of dyspnea on exertion.  She denies chest discomfort, palpitations, significant peripheral edema, shortness of breath at rest, paroxysmal nocturnal dyspnea, orthopnea, lightheadedness, dizziness, pre-syncope, or syncope.     Cardiographics (EKG personally reviewed):  EKG done 10/4/2024 shows sinus rhythm at 72 bpm, PACs, QRS 70 ms, QT/QTc 386/422 ms  EKG done 4/10/2024 shows sinus rhythm at 98 bpm, QRS 70 ms, QT/QTc 326/460 ms  EKG done 2/5/2024 shows atrial fibrillation with rapid ventricular response at 118 bpm.   Subsequent EKG shows sinus rhythm at 81 bpm, QRS 66 ms, QT/QTc 348/404 ms  EKG done 1/20/2023 shows atrial fibrillation with rapid ventricular response at 126 bpm     ECHO done 2/17/2023:  Left ventricular size, wall motion and function are normal. The ejection  fraction is 55-60%.  Normal right ventricle size and systolic function.  Both atria are of normal size.  No hemodynamically significant valvular abnormalities on 2D or color flow  imaging.  There is no comparison study available.     NM stress test done 5/18/2022:    Lexiscan stress ECG negative for ischemia.    The nuclear stress test is negative for inducible myocardial ischemia or infarction.    The left ventricular ejection fraction at stress is greater than 70%.    A prior study was conducted on 4/16/2019.  No interval change.    I have reviewed and updated the patient's Past Medical History, Social History, Family History and Medication List.  Outside records personally reviewed.     Physical Examination  Review of Systems   Vitals: /60 (BP Location: Right arm, Patient Position: Sitting, Cuff Size: Adult Regular)   Pulse 72   Resp 16   Ht 1.524 m (5')   Wt 55.3 kg (122 lb)   BMI 23.83 kg/m    BMI= Body mass index is 23.83 kg/m .  Wt Readings from Last 3 Encounters:   10/04/24 55.3 kg (122 lb)   07/15/24 55.3 kg (122 lb)   04/15/24 56.7 kg (125 lb)       General Appearance:   Alert, well-appearing and in no acute distress.   HEENT: Atraumatic, normocephalic.  No scleral icterus, normal conjunctivae, EOMs intact, PERRL.  Mucous membranes pink and moist.     Chest/Lungs:   Chest symmetric, spine straight.  Respirations unlabored.  Lungs are clear to auscultation.   Cardiovascular:   Regular rate and rhythm.  Normal first and second heart sounds with no murmurs, rubs, or gallops; radial and posterior tibial pulses are intact, No edema.   Abdomen:  Soft, nondistended, bowel sounds present.   Extremities: No cyanosis or clubbing.    Musculoskeletal: Moves all extremities.     Skin: Warm, dry, intact.    Neurologic: Mood and affect are appropriate.  Alert and oriented to person, place, time, and situation.     ROS: 10 point ROS neg other than the symptoms noted above in the HPI.         Medical History  Surgical History Family History Social History   Past Medical History:   Diagnosis Date    Abdominal aortic aneurysm (AAA) without rupture, unspecified part (H) 04/10/2024    Acute heart failure with preserved ejection fraction (H) 02/07/2024    Depression     Diabetes mellitus (H)     GERD (gastroesophageal reflux disease)     Hyperlipemia     Hypertension     Intracranial hemorrhage (H) 01/01/2011    may be amyloid angiopathy; has been stable.    Mastoiditis 01/01/2011    Near syncope 07/26/2020    Osteoarthritis     Paroxysmal atrial fibrillation (H) 04/21/2023    TMJ (temporomandibular joint disorder)      Past Surgical History:   Procedure Laterality Date    CYST REMOVAL      tongue    HYSTERECTOMY  1996    OOPHORECTOMY Bilateral 1996     Family History   Problem Relation Age of Onset    Heart Disease Father     No Known Problems Mother     No Known Problems Sister     No Known Problems Daughter     No Known Problems Maternal Grandmother     No Known Problems Maternal Grandfather     No Known Problems Paternal Grandmother     No Known Problems Paternal Grandfather     No Known Problems Maternal Aunt     No Known Problems Paternal Aunt     Hereditary Breast and Ovarian Cancer Syndrome No family hx of     Breast Cancer No family hx of     Cancer No family hx of     Colon Cancer No family hx of     Endometrial Cancer No family hx of     Ovarian Cancer No family hx of         Social History     Socioeconomic History    Marital status:      Spouse name: Not on file    Number of children: Not on file    Years of education: Not on file    Highest education level: Not on file   Occupational History    Not on file   Tobacco Use    Smoking  status: Former    Smokeless tobacco: Never   Substance and Sexual Activity    Alcohol use: Yes     Alcohol/week: 1.7 standard drinks of alcohol    Drug use: No    Sexual activity: Not on file   Other Topics Concern    Not on file   Social History Narrative    Not on file     Social Determinants of Health     Financial Resource Strain: Low Risk  (12/29/2023)    Received from [facility] [employer] [facility], [facility]    Financial Resource Strain     Difficulty of Paying Living Expenses: 3     Difficulty of Paying Living Expenses: Not on file   Food Insecurity: No Food Insecurity (12/29/2023)    Received from [facility] [employer] [facility] [employer] [facility], [facility]    Food Insecurity     Worried About Running Out of Food in the Last Year: 1   Transportation Needs: No Transportation Needs (12/29/2023)    Received from [facility], [facility]    Transportation Needs     Lack of Transportation (Medical): 1   Physical Activity: Not on file   Stress: Not on file   Social Connections: Socially Integrated (12/29/2023)    Received from [facility] [employer] [facility], [facility]    Social Connections     Frequency of Communication with Friends and Family: 0   Interpersonal Safety: Not on file   Housing Stability: Low Risk  (12/29/2023)    Received from [facility] [employer] [facility] [employer] [facility], [facility] [employer] [facility]    Housing Stability     Unable to Pay for Housing in the Last Year: 1           Medications  Allergies   Current Outpatient Medications   Medication Sig Dispense Refill    acetaminophen (TYLENOL) 500 MG tablet Take 1,000 mg by mouth 2 times daily as needed      alendronate (FOSAMAX) 70 MG tablet Take 70 mg by mouth every 7 days      allopurinol (ZYLOPRIM) 100 MG tablet Take 100 mg by mouth daily       amLODIPine (NORVASC) 5 MG tablet Take 1 tablet by mouth daily at 2 pm      apixaban ANTICOAGULANT (ELIQUIS ANTICOAGULANT) 2.5 MG tablet Take 1 tablet (2.5 mg) by mouth 2 times daily 180 tablet 2    atorvastatin (LIPITOR) 40 MG tablet [ATORVASTATIN (LIPITOR) 40 MG TABLET] Take 40 mg by mouth bedtime.      baclofen (LIORESAL) 10 MG tablet [BACLOFEN (LIORESAL) 10 MG TABLET] Take 10 mg by mouth at bedtime.       calcium carbonate (OS-MARECLO) 600 mg calcium (1,500 mg) tablet [CALCIUM CARBONATE (OS-MARCELO) 600 MG CALCIUM (1,500 MG) TABLET] Take 1 tablet by mouth daily.       celecoxib (CELEBREX) 200 MG capsule [CELECOXIB (CELEBREX) 200 MG CAPSULE] Take 200 mg by mouth daily.      cetirizine (ZYRTEC) 10 MG tablet Take 10 mg by mouth daily      CONTOUR NEXT TEST test strip 2 times daily      cyanocobalamin 1000 MCG tablet [CYANOCOBALAMIN 1000 MCG TABLET] Take 1,000 mcg by mouth daily.      hydrOXYzine (ATARAX) 25 MG tablet TAKE 1 TABLET BY MOUTH UP TO 3 TIMES DAILY AS NEEDED FOR ANXIETY      lactobacillus combo no.11 15 billion cell CpSP Take 1 capsule by mouth daily      lisinopril (ZESTRIL) 20 MG tablet TAKE 1 TABLET BY MOUTH TWICE A DAY FOR BLOOD PRESSURE      LORazepam (ATIVAN) 0.5 MG tablet [LORAZEPAM (ATIVAN) 0.5 MG TABLET] Take 0.5 mg by mouth 2 (two) times a day as needed.       magnesium oxide (MAG-OX) 400 mg tablet [MAGNESIUM OXIDE (MAG-OX) 400 MG TABLET] Take 400 mg by mouth daily.      metFORMIN (GLUCOPHAGE-XR) 500 MG 24 hr tablet [METFORMIN (GLUCOPHAGE-XR) 500 MG 24 HR TABLET] TAKE 1 TABLET BY MOUTH EVERY DAY FOR DIABETES  1    Microlet Lancets MISC TEST BLOOD SUGAR 2 X'S DAILY DX: E11.9      [START ON 10/11/2024] pantoprazole (PROTONIX) 40 MG EC tablet Take 1 tablet (40 mg) by mouth daily. Continue for 6 weeks after ablation 45 tablet 0    sertraline (ZOLOFT) 25 MG tablet Take 25 mg by mouth daily      sotalol (BETAPACE) 80 MG tablet Take 1 tablet (80 mg) by mouth 2 times daily 180 tablet 3    vitamin D3  "(CHOLECALCIFEROL) 50 mcg (2000 units) tablet Take 1 tablet by mouth daily         Allergies   Allergen Reactions    Seasonal Allergies         Denies previous adverse reaction to anesthesia      Lab Results    Chemistry/lipid CBC Cardiac Enzymes/BNP/TSH/INR   Recent Labs   Lab Test 05/11/23  1000   CHOL 117   HDL 51   LDL 43   TRIG 113     Recent Labs   Lab Test 05/11/23  1000 05/16/22  0950 07/31/20  0923   LDL 43 50 57     Recent Labs   Lab Test 04/11/24  1701 04/11/24  1112 04/11/24  0733   NA  --   --  135   POTASSIUM  --   --  3.6   CHLORIDE  --   --  99   CO2  --   --  24   *   < > 158*   BUN  --   --  4.0*   CR  --   --  0.49*   GFRESTIMATED  --   --  >90   MARCELO  --   --  9.6    < > = values in this interval not displayed.     Recent Labs   Lab Test 04/11/24  0733 04/10/24  2102 03/22/24  1444   CR 0.49* 0.57 0.62     Recent Labs   Lab Test 04/10/24  2102 12/15/21  1209   A1C 6.0* 5.9*     BMP, CBC drawn today, results pending     Recent Labs   Lab Test 04/11/24  0733   WBC 7.7   HGB 15.3   HCT 45.2   MCV 92        Recent Labs   Lab Test 04/11/24  0733 04/10/24  2102 03/22/24  1444   HGB 15.3 15.4 15.5    Recent Labs   Lab Test 05/05/22  1845 04/28/22  1416 01/13/21  0954   TROPONINI <0.01 <0.01 <0.01     Recent Labs   Lab Test 04/10/24  2102 02/25/24  2047 02/05/24  1006 05/05/22  1845 04/28/22  1416 05/26/21  1509   BNP  --   --   --  51 53 46   NTBNPI 265 186 3,675*  --   --   --      Recent Labs   Lab Test 04/10/24  2102   TSH 0.65     No results for input(s): \"INR\" in the last [id number] hours.     The longitudinal plan of care for the diagnosis(es)/condition(s) as documented were addressed during this visit. Due to the added complexity in care, I will continue to support Adilia in the subsequent management and with ongoing continuity of care.                                       "

## 2024-10-04 NOTE — H&P (VIEW-ONLY)
HEART CARE ELECTROPHYSIOLOGY NOTE      [facility]  [phone]      Assessment/Recommendations   Assessment/Plan:  1.  Paroxysmal Atrial Fibrillation:  Initially diagnosed 1/2023.  Symptoms consist of fatigue, progressive shortness of breath, acute HFpEF.  Failed antiarrhythmic therapy with sotalol due to side effects of shortness of breath.  She is scheduled for pulmonary vein isolation ablation with Dr. Max on 10/14/2024.  We discussed ablation, <1-2% risk for complication, expected recovery, and follow-up.  She was instructed to hold sotalol 3 days prior to ablation.  Start pantoprazole 40 mg daily 3 days prior to ablation, to continue for 6 weeks after ablation.  She states that her questions were answered to her satisfaction and she is ready to proceed.    She had a brief COVID exposure on Sunday.  Getting a COVID test today.     She has a [id number] score of 9 for age >75, female gender, HTN, HF, CAD, DM 2, TIA.  She also has an increased risk for bleed due to gait instability and history of SAH.  HAS-BLED score 3 for age, TIA, bleeding issue.  She may be a candidate for left atrial appendage closure.  Continue Eliquis 2.5 mg twice daily for stroke prophylaxis (age >80, weight <60 kg).     2.  Acute heart failure with preserved ejection fraction: Secondary to RVR.  Compensated, no fluid retention on exam or heart failure symptoms.        3.  Hypertension: Controlled with amlodipine, lisinopril, sotalol    Telephone follow up with EP RN on 10/17/2024  Follow-up with me 11/25/2024, 6 weeks post PVI     History of Present Illness/Subjective    HPI: Adilia Eric is a 83 year old female who comes in today accompanied by her grandson for EP follow up of atrial fibrillation and history and physical prior to pulmonary vein isolation ablation.   She has a history of paroxysmal atrial fibrillation, hypertension, mild nonobstructive coronary artery disease on angiography in 2013,  hyponatremia, type 2 diabetes, remote history of possible TIA, intracranial hemorrhage 2011 without recurrence, abdominal aortic aneurysm (3.3 cm), possible obstructive sleep apnea.     Hospitalized 4/10/2024 for dehydration secondary to ongoing diarrhea.  Diagnosed with diverticulitis and started on antibiotics.  During her brief hospitalization, she had sinus tachycardia due to dehydration which resolved, but no A-fib.    Arrhythmia history  Dx/date: Paroxysmal atrial fibrillation diagnosed January 2023.  She was seen in the ED on 2/5/2024 for progressive shortness of breath, wheezing, and fatigue for a little over a week.  She was noted to be in AF-RVR.  Rates did not slow with metoprolol, so she underwent urgent cardioversion.  Sx: Fatigue, progressive shortness of breath, acute HFpEF  [id number] score: 9 for age >75, female gender, HTN, HF, CAD, DM 2, TIA  HAS-BLED score 3 for age, TIA, bleeding issue.  Oral anticoagulation: Eliquis 2.5 mg twice daily, dose adjusted for age >80 and weight <60 kg  Antiarrhythmic medications, AV caitlyn blocking agents: Sotalol 80 mg twice daily, side effects-SOB (2/10/2024-present)  Procedures  DCCV: 2/5/2024 (ED)  Ablation: N/A     Adilia states she feels pretty well, though is having neck pain for which she is undergoing PT.  Her pulse has continued to be regular, no known episodes of A-fib, but worsened shortness of breath since starting sotalol.  She has a baseline of dyspnea on exertion.  She denies chest discomfort, palpitations, significant peripheral edema, shortness of breath at rest, paroxysmal nocturnal dyspnea, orthopnea, lightheadedness, dizziness, pre-syncope, or syncope.     Cardiographics (EKG personally reviewed):  EKG done 10/4/2024 shows sinus rhythm at 72 bpm, PACs, QRS 70 ms, QT/QTc 386/422 ms  EKG done 4/10/2024 shows sinus rhythm at 98 bpm, QRS 70 ms, QT/QTc 326/460 ms  EKG done 2/5/2024 shows atrial fibrillation with rapid ventricular response at 118 bpm.   Subsequent EKG shows sinus rhythm at 81 bpm, QRS 66 ms, QT/QTc 348/404 ms  EKG done 1/20/2023 shows atrial fibrillation with rapid ventricular response at 126 bpm     ECHO done 2/17/2023:  Left ventricular size, wall motion and function are normal. The ejection  fraction is 55-60%.  Normal right ventricle size and systolic function.  Both atria are of normal size.  No hemodynamically significant valvular abnormalities on 2D or color flow  imaging.  There is no comparison study available.     NM stress test done 5/18/2022:    Lexiscan stress ECG negative for ischemia.    The nuclear stress test is negative for inducible myocardial ischemia or infarction.    The left ventricular ejection fraction at stress is greater than 70%.    A prior study was conducted on 4/16/2019.  No interval change.    I have reviewed and updated the patient's Past Medical History, Social History, Family History and Medication List.  Outside records personally reviewed.     Physical Examination  Review of Systems   Vitals: /60 (BP Location: Right arm, Patient Position: Sitting, Cuff Size: Adult Regular)   Pulse 72   Resp 16   Ht 1.524 m (5')   Wt 55.3 kg (122 lb)   BMI 23.83 kg/m    BMI= Body mass index is 23.83 kg/m .  Wt Readings from Last 3 Encounters:   10/04/24 55.3 kg (122 lb)   07/15/24 55.3 kg (122 lb)   04/15/24 56.7 kg (125 lb)       General Appearance:   Alert, well-appearing and in no acute distress.   HEENT: Atraumatic, normocephalic.  No scleral icterus, normal conjunctivae, EOMs intact, PERRL.  Mucous membranes pink and moist.     Chest/Lungs:   Chest symmetric, spine straight.  Respirations unlabored.  Lungs are clear to auscultation.   Cardiovascular:   Regular rate and rhythm.  Normal first and second heart sounds with no murmurs, rubs, or gallops; radial and posterior tibial pulses are intact, No edema.   Abdomen:  Soft, nondistended, bowel sounds present.   Extremities: No cyanosis or clubbing.    Musculoskeletal: Moves all extremities.     Skin: Warm, dry, intact.    Neurologic: Mood and affect are appropriate.  Alert and oriented to person, place, time, and situation.     ROS: 10 point ROS neg other than the symptoms noted above in the HPI.         Medical History  Surgical History Family History Social History   Past Medical History:   Diagnosis Date    Abdominal aortic aneurysm (AAA) without rupture, unspecified part (H) 04/10/2024    Acute heart failure with preserved ejection fraction (H) 02/07/2024    Depression     Diabetes mellitus (H)     GERD (gastroesophageal reflux disease)     Hyperlipemia     Hypertension     Intracranial hemorrhage (H) 01/01/2011    may be amyloid angiopathy; has been stable.    Mastoiditis 01/01/2011    Near syncope 07/26/2020    Osteoarthritis     Paroxysmal atrial fibrillation (H) 04/21/2023    TMJ (temporomandibular joint disorder)      Past Surgical History:   Procedure Laterality Date    CYST REMOVAL      tongue    HYSTERECTOMY  1996    OOPHORECTOMY Bilateral 1996     Family History   Problem Relation Age of Onset    Heart Disease Father     No Known Problems Mother     No Known Problems Sister     No Known Problems Daughter     No Known Problems Maternal Grandmother     No Known Problems Maternal Grandfather     No Known Problems Paternal Grandmother     No Known Problems Paternal Grandfather     No Known Problems Maternal Aunt     No Known Problems Paternal Aunt     Hereditary Breast and Ovarian Cancer Syndrome No family hx of     Breast Cancer No family hx of     Cancer No family hx of     Colon Cancer No family hx of     Endometrial Cancer No family hx of     Ovarian Cancer No family hx of         Social History     Socioeconomic History    Marital status:      Spouse name: Not on file    Number of children: Not on file    Years of education: Not on file    Highest education level: Not on file   Occupational History    Not on file   Tobacco Use    Smoking  status: Former    Smokeless tobacco: Never   Substance and Sexual Activity    Alcohol use: Yes     Alcohol/week: 1.7 standard drinks of alcohol    Drug use: No    Sexual activity: Not on file   Other Topics Concern    Not on file   Social History Narrative    Not on file     Social Determinants of Health     Financial Resource Strain: Low Risk  (12/29/2023)    Received from [facility] [employer] [facility], [facility]    Financial Resource Strain     Difficulty of Paying Living Expenses: 3     Difficulty of Paying Living Expenses: Not on file   Food Insecurity: No Food Insecurity (12/29/2023)    Received from [facility] [employer] [facility], [facility]    Food Insecurity     Worried About Running Out of Food in the Last Year: 1   Transportation Needs: No Transportation Needs (12/29/2023)    Received from [facility], [facility]    Transportation Needs     Lack of Transportation (Medical): 1   Physical Activity: Not on file   Stress: Not on file   Social Connections: Socially Integrated (12/29/2023)    Received from [facility] [employer] [facility], [facility]    Social Connections     Frequency of Communication with Friends and Family: 0   Interpersonal Safety: Not on file   Housing Stability: Low Risk  (12/29/2023)    Received from [facility] [employer] [facility], [facility] [employer] [facility]    Housing Stability     Unable to Pay for Housing in the Last Year: 1           Medications  Allergies   Current Outpatient Medications   Medication Sig Dispense Refill    acetaminophen (TYLENOL) 500 MG tablet Take 1,000 mg by mouth 2 times daily as needed      alendronate (FOSAMAX) 70 MG tablet Take 70 mg by mouth every 7 days      allopurinol (ZYLOPRIM) 100 MG tablet Take 100 mg by mouth daily       amLODIPine (NORVASC) 5 MG tablet Take 1 tablet by mouth daily at 2 pm      apixaban ANTICOAGULANT (ELIQUIS ANTICOAGULANT) 2.5 MG tablet Take 1 tablet (2.5 mg) by mouth 2 times daily 180 tablet 2    atorvastatin (LIPITOR) 40 MG tablet [ATORVASTATIN (LIPITOR) 40 MG TABLET] Take 40 mg by mouth bedtime.      baclofen (LIORESAL) 10 MG tablet [BACLOFEN (LIORESAL) 10 MG TABLET] Take 10 mg by mouth at bedtime.       calcium carbonate (OS-MARCELO) 600 mg calcium (1,500 mg) tablet [CALCIUM CARBONATE (OS-MARCELO) 600 MG CALCIUM (1,500 MG) TABLET] Take 1 tablet by mouth daily.       celecoxib (CELEBREX) 200 MG capsule [CELECOXIB (CELEBREX) 200 MG CAPSULE] Take 200 mg by mouth daily.      cetirizine (ZYRTEC) 10 MG tablet Take 10 mg by mouth daily      CONTOUR NEXT TEST test strip 2 times daily      cyanocobalamin 1000 MCG tablet [CYANOCOBALAMIN 1000 MCG TABLET] Take 1,000 mcg by mouth daily.      hydrOXYzine (ATARAX) 25 MG tablet TAKE 1 TABLET BY MOUTH UP TO 3 TIMES DAILY AS NEEDED FOR ANXIETY      lactobacillus combo no.11 15 billion cell CpSP Take 1 capsule by mouth daily      lisinopril (ZESTRIL) 20 MG tablet TAKE 1 TABLET BY MOUTH TWICE A DAY FOR BLOOD PRESSURE      LORazepam (ATIVAN) 0.5 MG tablet [LORAZEPAM (ATIVAN) 0.5 MG TABLET] Take 0.5 mg by mouth 2 (two) times a day as needed.       magnesium oxide (MAG-OX) 400 mg tablet [MAGNESIUM OXIDE (MAG-OX) 400 MG TABLET] Take 400 mg by mouth daily.      metFORMIN (GLUCOPHAGE-XR) 500 MG 24 hr tablet [METFORMIN (GLUCOPHAGE-XR) 500 MG 24 HR TABLET] TAKE 1 TABLET BY MOUTH EVERY DAY FOR DIABETES  1    Microlet Lancets MISC TEST BLOOD SUGAR 2 X'S DAILY DX: E11.9      [START ON 10/11/2024] pantoprazole (PROTONIX) 40 MG EC tablet Take 1 tablet (40 mg) by mouth daily. Continue for 6 weeks after ablation 45 tablet 0    sertraline (ZOLOFT) 25 MG tablet Take 25 mg by mouth daily      sotalol (BETAPACE) 80 MG tablet Take 1 tablet (80 mg) by mouth 2 times daily 180 tablet 3    vitamin D3  "(CHOLECALCIFEROL) 50 mcg (2000 units) tablet Take 1 tablet by mouth daily         Allergies   Allergen Reactions    Seasonal Allergies         Denies previous adverse reaction to anesthesia      Lab Results    Chemistry/lipid CBC Cardiac Enzymes/BNP/TSH/INR   Recent Labs   Lab Test 05/11/23  1000   CHOL 117   HDL 51   LDL 43   TRIG 113     Recent Labs   Lab Test 05/11/23  1000 05/16/22  0950 07/31/20  0923   LDL 43 50 57     Recent Labs   Lab Test 04/11/24  1701 04/11/24  1112 04/11/24  0733   NA  --   --  135   POTASSIUM  --   --  3.6   CHLORIDE  --   --  99   CO2  --   --  24   *   < > 158*   BUN  --   --  4.0*   CR  --   --  0.49*   GFRESTIMATED  --   --  >90   MARCELO  --   --  9.6    < > = values in this interval not displayed.     Recent Labs   Lab Test 04/11/24  0733 04/10/24  2102 03/22/24  1444   CR 0.49* 0.57 0.62     Recent Labs   Lab Test 04/10/24  2102 12/15/21  1209   A1C 6.0* 5.9*     BMP, CBC drawn today, results pending     Recent Labs   Lab Test 04/11/24  0733   WBC 7.7   HGB 15.3   HCT 45.2   MCV 92        Recent Labs   Lab Test 04/11/24  0733 04/10/24  2102 03/22/24  1444   HGB 15.3 15.4 15.5    Recent Labs   Lab Test 05/05/22  1845 04/28/22  1416 01/13/21  0954   TROPONINI <0.01 <0.01 <0.01     Recent Labs   Lab Test 04/10/24  2102 02/25/24  2047 02/05/24  1006 05/05/22  1845 04/28/22  1416 05/26/21  1509   BNP  --   --   --  51 53 46   NTBNPI 265 186 3,675*  --   --   --      Recent Labs   Lab Test 04/10/24  2102   TSH 0.65     No results for input(s): \"INR\" in the last [id number] hours.     The longitudinal plan of care for the diagnosis(es)/condition(s) as documented were addressed during this visit. Due to the added complexity in care, I will continue to support Adilia in the subsequent management and with ongoing continuity of care.                                       "

## 2024-10-04 NOTE — PROGRESS NOTES
Pre-Procedure Pulmonary Vein Ablation (AF) Education    Procedure: PVI with Dr Max on 10-14-24 with arrival time 5:30am    COVID: Pt denies COVID like symptoms, and is aware if he/she develops COVID like symptoms they would need to complete an at home with a rapid antigen COVID test 1-2 days prior to your procedure date. If COVID + pt is aware the procedure will need to be rescheduled, and to contact CV scheduling as soon as possible    Type & Screen: Is not required for PVI Ablation    Pre-Op H&P: Completed today with EP PADMINI- See record in Epic    Education:   Reviewed with pt in Clinic today  Pre-Procedure Instruction: NPO after midnight pre procedure, Defined NPO, Remove all jewelry and leave all valuables at home, Shower prior to arrival, Anesthesia and intubation plan/orders, Intra-procedure PVI process, Post- PVI procedure expectations/recovery, Transportation requirements and arrangements post procedure, Post-procedure follow up process, Letter sent to pt via V[employer] and mail with written instructions (Refer to letters tab), Lab results would be called to pt if abnormal  Risks:   Atrial Fibrillation Ablation/Left Atrial Ablation  Cardiac Ablation  <1% Hypotension, Hemorrhage, Thrombophlebitis, Systemic or pulmonic emboli, Cardiac perforation (tamponade), Infection, Pneumothorax, Arrhythmias, Proarrhythmic effects of drugs, Radiation exposure, Catheter entrapment  <1 % Vascular injury including perforation of vein, artery or heart  1-2% Tamponade and Aortic puncture with left sided transeptal approach  1% CVA   <1% MI  <0.1% death  If external defibrillation or CV is needed, 25% risk for superficial burn  Risks associated with general anesthesia will be addressed by the Anesthesiology Department  Radiofrequency Risks:  In addition to standard risks for Radiofrequency Ablation, there is:  <2% Significant pulmonary vein stenosis  <2% Embolic events  <1% Esophageal fistula  <1% Phrenic nerve paralysis    Cryoablation Risks:  In addition to standard risks for Cryoablation Ablation, there is:  <1% Phrenic nerve paralysis  <1% Pulmonary vein stenosis  <1% Esophageal fistula    Medication:   Instructions regarding anticoagulants: Eliquis- Continue anticoagulation uninterrupted through their procedure, do not miss any doses of AC prior to procedure, importance of taking AC for stroke prevention, taking AC as prescribed, to call prior to PVI if missed a dose of AC, and if upon arrival pt reports missing a dose of AC PVI will potentially be cnx/postponed; pt reports no missed doses  Instructions given to pt regarding antiarrhythmic medication: Multaq- Hold 3 days prior to procedure  Instructions given to pt regarding PPI medication: Start Protonix 40mg Daily 3 days prior, 6wk post  Instructions given to pt regarding diuretics medication: None  Instructions given to pt regarding DM/GLP-1 medication:   DM- Hold all oral diabetic medications and short acting insulin the morning of the procedure, and take 1/2 of pts scheduled doses of long acting insulin the PM prior and/or AM of procedure  GLP-1- None  Instructions for medication, other than anticoagulants and antiarrhythmics listed above, given to pt: Take all medication AM of procedure with small sips of water     Important patient information for staff: None    10/4/2024 9:53 AM  Yany Ellis RN

## 2024-10-04 NOTE — PATIENT INSTRUCTIONS
Adilia Eric,    It was a pleasure to see you today at the [facility].     My recommendations after this visit include:    You are scheduled for pulmonary vein isolation ablation with Dr. Max on 10/14/2024    Hold sotalol for 3 days prior to ablation, last dose 10/10/2024  On 10/11/2024, start pantoprazole 40 mg daily, to continue for 6 weeks after ablation    Telephone follow-up with EP RN on 10/17/2024  Follow up with me on 11/25/2024    Tavia Maxwell, CNP  [facility], Electrophysiology  [phone]  EP nurses [phone]

## 2024-10-04 NOTE — LETTER
10/4/2024    PARVIN HERNANDEZ [facility] [address]  [facility] [address]    RE: Adilia M Jeaneth       Dear Colleague,     I had the pleasure of seeing Adilia Eric in the [facility].    HEART CARE ELECTROPHYSIOLOGY NOTE      [facility]  [phone]      Assessment/Recommendations   Assessment/Plan:  1.  Paroxysmal Atrial Fibrillation:  Initially diagnosed 1/2023.  Symptoms consist of fatigue, progressive shortness of breath, acute HFpEF.  Failed antiarrhythmic therapy with sotalol due to side effects of shortness of breath.  She is scheduled for pulmonary vein isolation ablation with Dr. Max on 10/14/2024.  We discussed ablation, <1-2% risk for complication, expected recovery, and follow-up.  She was instructed to hold sotalol 3 days prior to ablation.  Start pantoprazole 40 mg daily 3 days prior to ablation, to continue for 6 weeks after ablation.  She states that her questions were answered to her satisfaction and she is ready to proceed.    She had a brief COVID exposure on Sunday.  Getting a COVID test today.     She has a [id number] score of 9 for age >75, female gender, HTN, HF, CAD, DM 2, TIA.  She also has an increased risk for bleed due to gait instability and history of SAH.  HAS-BLED score 3 for age, TIA, bleeding issue.  She may be a candidate for left atrial appendage closure.  Continue Eliquis 2.5 mg twice daily for stroke prophylaxis (age >80, weight <60 kg).     2.  Acute heart failure with preserved ejection fraction: Secondary to RVR.  Compensated, no fluid retention on exam or heart failure symptoms.        3.  Hypertension: Controlled with amlodipine, lisinopril, sotalol    Telephone follow up with EP RN on 10/17/2024  Follow-up with me 11/25/2024, 6 weeks post PVI     History of Present Illness/Subjective    HPI: Adilia Eric is a 83 year old female who comes in today accompanied by her grandson for EP  follow up of atrial fibrillation and history and physical prior to pulmonary vein isolation ablation.   She has a history of paroxysmal atrial fibrillation, hypertension, mild nonobstructive coronary artery disease on angiography in 2013, hyponatremia, type 2 diabetes, remote history of possible TIA, intracranial hemorrhage 2011 without recurrence, abdominal aortic aneurysm (3.3 cm), possible obstructive sleep apnea.     Hospitalized 4/10/2024 for dehydration secondary to ongoing diarrhea.  Diagnosed with diverticulitis and started on antibiotics.  During her brief hospitalization, she had sinus tachycardia due to dehydration which resolved, but no A-fib.    Arrhythmia history  Dx/date: Paroxysmal atrial fibrillation diagnosed January 2023.  She was seen in the ED on 2/5/2024 for progressive shortness of breath, wheezing, and fatigue for a little over a week.  She was noted to be in AF-RVR.  Rates did not slow with metoprolol, so she underwent urgent cardioversion.  Sx: Fatigue, progressive shortness of breath, acute HFpEF  [id number] score: 9 for age >75, female gender, HTN, HF, CAD, DM 2, TIA  HAS-BLED score 3 for age, TIA, bleeding issue.  Oral anticoagulation: Eliquis 2.5 mg twice daily, dose adjusted for age >80 and weight <60 kg  Antiarrhythmic medications, AV caitlyn blocking agents: Sotalol 80 mg twice daily, side effects-SOB (2/10/2024-present)  Procedures  DCCV: 2/5/2024 (ED)  Ablation: N/A     Adilia states she feels pretty well, though is having neck pain for which she is undergoing PT.  Her pulse has continued to be regular, no known episodes of A-fib, but worsened shortness of breath since starting sotalol.  She has a baseline of dyspnea on exertion.  She denies chest discomfort, palpitations, significant peripheral edema, shortness of breath at rest, paroxysmal nocturnal dyspnea, orthopnea, lightheadedness, dizziness, pre-syncope, or syncope.     Cardiographics (EKG personally reviewed):  EKG done  10/4/2024 shows sinus rhythm at 72 bpm, PACs, QRS 70 ms, QT/QTc 386/422 ms  EKG done 4/10/2024 shows sinus rhythm at 98 bpm, QRS 70 ms, QT/QTc 326/460 ms  EKG done 2/5/2024 shows atrial fibrillation with rapid ventricular response at 118 bpm.  Subsequent EKG shows sinus rhythm at 81 bpm, QRS 66 ms, QT/QTc 348/404 ms  EKG done 1/20/2023 shows atrial fibrillation with rapid ventricular response at 126 bpm     ECHO done 2/17/2023:  Left ventricular size, wall motion and function are normal. The ejection  fraction is 55-60%.  Normal right ventricle size and systolic function.  Both atria are of normal size.  No hemodynamically significant valvular abnormalities on 2D or color flow  imaging.  There is no comparison study available.     NM stress test done 5/18/2022:     Lexiscan stress ECG negative for ischemia.     The nuclear stress test is negative for inducible myocardial ischemia or infarction.     The left ventricular ejection fraction at stress is greater than 70%.     A prior study was conducted on 4/16/2019.  No interval change.    I have reviewed and updated the patient's Past Medical History, Social History, Family History and Medication List.  Outside records personally reviewed.     Physical Examination  Review of Systems   Vitals: /60 (BP Location: Right arm, Patient Position: Sitting, Cuff Size: Adult Regular)   Pulse 72   Resp 16   Ht 1.524 m (5')   Wt 55.3 kg (122 lb)   BMI 23.83 kg/m    BMI= Body mass index is 23.83 kg/m .  Wt Readings from Last 3 Encounters:   10/04/24 55.3 kg (122 lb)   07/15/24 55.3 kg (122 lb)   04/15/24 56.7 kg (125 lb)       General Appearance:   Alert, well-appearing and in no acute distress.   HEENT: Atraumatic, normocephalic.  No scleral icterus, normal conjunctivae, EOMs intact, PERRL.  Mucous membranes pink and moist.     Chest/Lungs:   Chest symmetric, spine straight.  Respirations unlabored.  Lungs are clear to auscultation.   Cardiovascular:   Regular rate and  rhythm.  Normal first and second heart sounds with no murmurs, rubs, or gallops; radial and posterior tibial pulses are intact, No edema.   Abdomen:  Soft, nondistended, bowel sounds present.   Extremities: No cyanosis or clubbing.   Musculoskeletal: Moves all extremities.     Skin: Warm, dry, intact.    Neurologic: Mood and affect are appropriate.  Alert and oriented to person, place, time, and situation.     ROS: 10 point ROS neg other than the symptoms noted above in the HPI.         Medical History  Surgical History Family History Social History   Past Medical History:   Diagnosis Date     Abdominal aortic aneurysm (AAA) without rupture, unspecified part (H) 04/10/2024     Acute heart failure with preserved ejection fraction (H) 02/07/2024     Depression      Diabetes mellitus (H)      GERD (gastroesophageal reflux disease)      Hyperlipemia      Hypertension      Intracranial hemorrhage (H) 01/01/2011    may be amyloid angiopathy; has been stable.     Mastoiditis 01/01/2011     Near syncope 07/26/2020     Osteoarthritis      Paroxysmal atrial fibrillation (H) 04/21/2023     TMJ (temporomandibular joint disorder)      Past Surgical History:   Procedure Laterality Date     CYST REMOVAL      tongue     HYSTERECTOMY  1996     OOPHORECTOMY Bilateral 1996     Family History   Problem Relation Age of Onset     Heart Disease Father      No Known Problems Mother      No Known Problems Sister      No Known Problems Daughter      No Known Problems Maternal Grandmother      No Known Problems Maternal Grandfather      No Known Problems Paternal Grandmother      No Known Problems Paternal Grandfather      No Known Problems Maternal Aunt      No Known Problems Paternal Aunt      Hereditary Breast and Ovarian Cancer Syndrome No family hx of      Breast Cancer No family hx of      Cancer No family hx of      Colon Cancer No family hx of      Endometrial Cancer No family hx of      Ovarian Cancer No family hx of         Social  History     Socioeconomic History     Marital status:      Spouse name: Not on file     Number of children: Not on file     Years of education: Not on file     Highest education level: Not on file   Occupational History     Not on file   Tobacco Use     Smoking status: Former     Smokeless tobacco: Never   Substance and Sexual Activity     Alcohol use: Yes     Alcohol/week: 1.7 standard drinks of alcohol     Drug use: No     Sexual activity: Not on file   Other Topics Concern     Not on file   Social History Narrative     Not on file     Social Determinants of Health     Financial Resource Strain: Low Risk  (12/29/2023)    [facility] [facility] [place]    Financial Resource Strain      Difficulty of Paying Living Expenses: 3      Difficulty of Paying Living Expenses: Not on file   Food Insecurity: No Food Insecurity (12/29/2023)    [facility] [facility] [place]    Food Insecurity      Worried About Running Out of Food in the Last Year: 1   Transportation Needs: No Transportation Needs (12/29/2023)    [facility] [employer] [facility] [place]    Transportation Needs      Lack of Transportation (Medical): 1   Physical Activity: Not on file   Stress: Not on file   Social Connections: Socially Integrated (12/29/2023)    Received [facility] [employer] [facility] [facility] [employer] [facility]    Social Connections      Frequency of Communication with Friends and Family: 0   Interpersonal Safety: Not on file   Housing Stability: Low Risk  (12/29/2023)    [facility] [place]    Housing Stability      Unable to Pay for Housing in the Last Year: 1           Medications  Allergies   Current  Outpatient Medications   Medication Sig Dispense Refill     acetaminophen (TYLENOL) 500 MG tablet Take 1,000 mg by mouth 2 times daily as needed       alendronate (FOSAMAX) 70 MG tablet Take 70 mg by mouth every 7 days       allopurinol (ZYLOPRIM) 100 MG tablet Take 100 mg by mouth daily       amLODIPine (NORVASC) 5 MG tablet Take 1 tablet by mouth daily at 2 pm       apixaban ANTICOAGULANT (ELIQUIS ANTICOAGULANT) 2.5 MG tablet Take 1 tablet (2.5 mg) by mouth 2 times daily 180 tablet 2     atorvastatin (LIPITOR) 40 MG tablet [ATORVASTATIN (LIPITOR) 40 MG TABLET] Take 40 mg by mouth bedtime.       baclofen (LIORESAL) 10 MG tablet [BACLOFEN (LIORESAL) 10 MG TABLET] Take 10 mg by mouth at bedtime.        calcium carbonate (OS-MARCELO) 600 mg calcium (1,500 mg) tablet [CALCIUM CARBONATE (OS-MARCELO) 600 MG CALCIUM (1,500 MG) TABLET] Take 1 tablet by mouth daily.        celecoxib (CELEBREX) 200 MG capsule [CELECOXIB (CELEBREX) 200 MG CAPSULE] Take 200 mg by mouth daily.       cetirizine (ZYRTEC) 10 MG tablet Take 10 mg by mouth daily       CONTOUR NEXT TEST test strip 2 times daily       cyanocobalamin 1000 MCG tablet [CYANOCOBALAMIN 1000 MCG TABLET] Take 1,000 mcg by mouth daily.       hydrOXYzine (ATARAX) 25 MG tablet TAKE 1 TABLET BY MOUTH UP TO 3 TIMES DAILY AS NEEDED FOR ANXIETY       lactobacillus combo no.11 15 billion cell CpSP Take 1 capsule by mouth daily       lisinopril (ZESTRIL) 20 MG tablet TAKE 1 TABLET BY MOUTH TWICE A DAY FOR BLOOD PRESSURE       LORazepam (ATIVAN) 0.5 MG tablet [LORAZEPAM (ATIVAN) 0.5 MG TABLET] Take 0.5 mg by mouth 2 (two) times a day as needed.        magnesium oxide (MAG-OX) 400 mg tablet [MAGNESIUM OXIDE (MAG-OX) 400 MG TABLET] Take 400 mg by mouth daily.       metFORMIN (GLUCOPHAGE-XR) 500 MG 24 hr tablet [METFORMIN (GLUCOPHAGE-XR) 500 MG 24 HR TABLET] TAKE 1 TABLET BY MOUTH EVERY DAY FOR DIABETES  1     Microlet Lancets MISC TEST BLOOD SUGAR 2 X'S DAILY DX: E11.9       [START ON  "10/11/2024] pantoprazole (PROTONIX) 40 MG EC tablet Take 1 tablet (40 mg) by mouth daily. Continue for 6 weeks after ablation 45 tablet 0     sertraline (ZOLOFT) 25 MG tablet Take 25 mg by mouth daily       sotalol (BETAPACE) 80 MG tablet Take 1 tablet (80 mg) by mouth 2 times daily 180 tablet 3     vitamin D3 (CHOLECALCIFEROL) 50 mcg (2000 units) tablet Take 1 tablet by mouth daily         Allergies   Allergen Reactions     Seasonal Allergies         Denies previous adverse reaction to anesthesia      Lab Results    Chemistry/lipid CBC Cardiac Enzymes/BNP/TSH/INR   Recent Labs   Lab Test 05/11/23  1000   CHOL 117   HDL 51   LDL 43   TRIG 113     Recent Labs   Lab Test 05/11/23  1000 05/16/22  0950 07/31/20  0923   LDL 43 50 57     Recent Labs   Lab Test 04/11/24  1701 04/11/24  1112 04/11/24  0733   NA  --   --  135   POTASSIUM  --   --  3.6   CHLORIDE  --   --  99   CO2  --   --  24   *   < > 158*   BUN  --   --  4.0*   CR  --   --  0.49*   GFRESTIMATED  --   --  >90   MARCELO  --   --  9.6    < > = values in this interval not displayed.     Recent Labs   Lab Test 04/11/24  0733 04/10/24  2102 03/22/24  1444   CR 0.49* 0.57 0.62     Recent Labs   Lab Test 04/10/24  2102 12/15/21  1209   A1C 6.0* 5.9*     BMP, CBC drawn today, results pending     Recent Labs   Lab Test 04/11/24  0733   WBC 7.7   HGB 15.3   HCT 45.2   MCV 92        Recent Labs   Lab Test 04/11/24  0733 04/10/24  2102 03/22/24  1444   HGB 15.3 15.4 15.5    Recent Labs   Lab Test 05/05/22 1845 04/28/22  1416 01/13/21  0954   TROPONINI <0.01 <0.01 <0.01     Recent Labs   Lab Test 04/10/24  2102 02/25/24  2047 02/05/24  1006 05/05/22  1845 04/28/22  1416 05/26/21  1509   BNP  --   --   --  51 53 46   NTBNPI 265 186 3,675*  --   --   --      Recent Labs   Lab Test 04/10/24  2102   TSH 0.65     No results for input(s): \"INR\" in the last 42515 hours.     The longitudinal plan of care for the diagnosis(es)/condition(s) as documented were " addressed during this visit. Due to the added complexity in care, I will continue to support Adilia in the subsequent management and with ongoing continuity of care.                                           Thank you for allowing me to participate in the care of your patient.      Sincerely,     PARVIN Gunn [facility]  cc:   No referring provider defined for this encounter.

## 2024-10-12 ENCOUNTER — ANESTHESIA EVENT (OUTPATIENT)
Dept: CARDIOLOGY | Facility: HOSPITAL | Age: 84
End: 2024-10-12
Payer: COMMERCIAL

## 2024-10-14 ENCOUNTER — ANESTHESIA (OUTPATIENT)
Dept: CARDIOLOGY | Facility: HOSPITAL | Age: 84
End: 2024-10-14
Payer: COMMERCIAL

## 2024-10-14 ENCOUNTER — HOSPITAL ENCOUNTER (OUTPATIENT)
Facility: HOSPITAL | Age: 84
Discharge: HOME | End: 2024-10-14
Attending: INTERNAL MEDICINE | Admitting: INTERNAL MEDICINE
Payer: COMMERCIAL

## 2024-10-14 VITALS
TEMPERATURE: 98.4 F | HEART RATE: 126 BPM | DIASTOLIC BLOOD PRESSURE: 69 MMHG | HEIGHT: 60 IN | RESPIRATION RATE: 25 BRPM | OXYGEN SATURATION: 96 % | SYSTOLIC BLOOD PRESSURE: 114 MMHG | WEIGHT: 120.8 LBS | BODY MASS INDEX: 23.71 KG/M2

## 2024-10-14 DIAGNOSIS — I48.0 PAROXYSMAL ATRIAL FIBRILLATION (H): ICD-10-CM

## 2024-10-14 LAB
ACT BLD: 291 SECONDS (ref 74–150)
ACT BLD: 350 SECONDS (ref 74–150)
ACT BLD: 358 SECONDS (ref 74–150)
ANION GAP SERPL CALCULATED.3IONS-SCNC: 13 MMOL/L (ref 7–15)
ATRIAL RATE - MUSE: 138 BPM
BUN SERPL-MCNC: 8.9 MG/DL (ref 8–23)
CALCIUM SERPL-MCNC: 9.9 MG/DL (ref 8.8–10.4)
CHLORIDE SERPL-SCNC: 98 MMOL/L (ref 98–107)
CREAT SERPL-MCNC: 0.58 MG/DL (ref 0.51–0.95)
DIASTOLIC BLOOD PRESSURE - MUSE: NORMAL MMHG
EGFRCR SERPLBLD CKD-EPI 2021: 89 ML/MIN/1.73M2
ERYTHROCYTE [DISTWIDTH] IN BLOOD BY AUTOMATED COUNT: 13.3 % (ref 10–15)
GLUCOSE SERPL-MCNC: 103 MG/DL (ref 70–99)
HCO3 SERPL-SCNC: 23 MMOL/L (ref 22–29)
HCT VFR BLD AUTO: 42.4 % (ref 35–47)
HGB BLD-MCNC: 14.3 G/DL (ref 11.7–15.7)
INTERPRETATION ECG - MUSE: NORMAL
MCH RBC QN AUTO: 31 PG (ref 26.5–33)
MCHC RBC AUTO-ENTMCNC: 33.7 G/DL (ref 31.5–36.5)
MCV RBC AUTO: 92 FL (ref 78–100)
P AXIS - MUSE: NORMAL DEGREES
PLATELET # BLD AUTO: 269 10E3/UL (ref 150–450)
POTASSIUM SERPL-SCNC: 3.5 MMOL/L (ref 3.4–5.3)
PR INTERVAL - MUSE: 128 MS
QRS DURATION - MUSE: 66 MS
QT - MUSE: 354 MS
QTC - MUSE: 536 MS
R AXIS - MUSE: -28 DEGREES
RBC # BLD AUTO: 4.61 10E6/UL (ref 3.8–5.2)
SODIUM SERPL-SCNC: 134 MMOL/L (ref 135–145)
SYSTOLIC BLOOD PRESSURE - MUSE: NORMAL MMHG
T AXIS - MUSE: 60 DEGREES
VENTRICULAR RATE- MUSE: 138 BPM
WBC # BLD AUTO: 10.7 10E3/UL (ref 4–11)

## 2024-10-14 PROCEDURE — 250N000011 HC RX IP 250 OP 636

## 2024-10-14 PROCEDURE — 93656 COMPRE EP EVAL ABLTJ ATR FIB: CPT | Performed by: INTERNAL MEDICINE

## 2024-10-14 PROCEDURE — C1766 INTRO/SHEATH,STRBLE,NON-PEEL: HCPCS | Performed by: INTERNAL MEDICINE

## 2024-10-14 PROCEDURE — 710N000010 HC RECOVERY PHASE 1, LEVEL 2, PER MIN

## 2024-10-14 PROCEDURE — 93656 COMPRE EP EVAL ABLTJ ATR FIB: CPT | Performed by: ANESTHESIOLOGY

## 2024-10-14 PROCEDURE — 80048 BASIC METABOLIC PNL TOTAL CA: CPT | Performed by: INTERNAL MEDICINE

## 2024-10-14 PROCEDURE — C1894 INTRO/SHEATH, NON-LASER: HCPCS | Performed by: INTERNAL MEDICINE

## 2024-10-14 PROCEDURE — 999N000054 HC STATISTIC EKG NON-CHARGEABLE

## 2024-10-14 PROCEDURE — C1732 CATH, EP, DIAG/ABL, 3D/VECT: HCPCS | Performed by: INTERNAL MEDICINE

## 2024-10-14 PROCEDURE — 258N000003 HC RX IP 258 OP 636: Performed by: INTERNAL MEDICINE

## 2024-10-14 PROCEDURE — 250N000009 HC RX 250

## 2024-10-14 PROCEDURE — 93005 ELECTROCARDIOGRAM TRACING: CPT

## 2024-10-14 PROCEDURE — 272N000001 HC OR GENERAL SUPPLY STERILE: Performed by: INTERNAL MEDICINE

## 2024-10-14 PROCEDURE — 258N000003 HC RX IP 258 OP 636

## 2024-10-14 PROCEDURE — C1733 CATH, EP, OTHR THAN COOL-TIP: HCPCS | Performed by: INTERNAL MEDICINE

## 2024-10-14 PROCEDURE — C1730 CATH, EP, 19 OR FEW ELECT: HCPCS | Performed by: INTERNAL MEDICINE

## 2024-10-14 PROCEDURE — C1887 CATHETER, GUIDING: HCPCS | Performed by: INTERNAL MEDICINE

## 2024-10-14 PROCEDURE — 370N000017 HC ANESTHESIA TECHNICAL FEE, PER MIN: Performed by: INTERNAL MEDICINE

## 2024-10-14 PROCEDURE — 85347 COAGULATION TIME ACTIVATED: CPT

## 2024-10-14 PROCEDURE — 93010 ELECTROCARDIOGRAM REPORT: CPT | Performed by: STUDENT IN AN ORGANIZED HEALTH CARE EDUCATION/TRAINING PROGRAM

## 2024-10-14 PROCEDURE — 93615 ESOPHAGEAL RECORDING: CPT | Performed by: INTERNAL MEDICINE

## 2024-10-14 PROCEDURE — 93615 ESOPHAGEAL RECORDING: CPT | Mod: 26 | Performed by: INTERNAL MEDICINE

## 2024-10-14 PROCEDURE — 250N000009 HC RX 250: Performed by: INTERNAL MEDICINE

## 2024-10-14 PROCEDURE — C1759 CATH, INTRA ECHOCARDIOGRAPHY: HCPCS | Performed by: INTERNAL MEDICINE

## 2024-10-14 PROCEDURE — 250N000011 HC RX IP 250 OP 636: Performed by: INTERNAL MEDICINE

## 2024-10-14 PROCEDURE — 85027 COMPLETE CBC AUTOMATED: CPT | Performed by: INTERNAL MEDICINE

## 2024-10-14 PROCEDURE — 93656 COMPRE EP EVAL ABLTJ ATR FIB: CPT

## 2024-10-14 PROCEDURE — 99100 ANES PT EXTEME AGE<1 YR&>70: CPT

## 2024-10-14 PROCEDURE — 36415 COLL VENOUS BLD VENIPUNCTURE: CPT | Performed by: INTERNAL MEDICINE

## 2024-10-14 RX ORDER — FENTANYL CITRATE 50 UG/ML
25 INJECTION, SOLUTION INTRAMUSCULAR; INTRAVENOUS
Status: DISCONTINUED | OUTPATIENT
Start: 2024-10-14 | End: 2024-10-14 | Stop reason: HOSPADM

## 2024-10-14 RX ORDER — PROPOFOL 10 MG/ML
INJECTION, EMULSION INTRAVENOUS PRN
Status: DISCONTINUED | OUTPATIENT
Start: 2024-10-14 | End: 2024-10-14

## 2024-10-14 RX ORDER — ACETAMINOPHEN 325 MG/1
650 TABLET ORAL EVERY 4 HOURS PRN
Status: DISCONTINUED | OUTPATIENT
Start: 2024-10-14 | End: 2024-10-14 | Stop reason: HOSPADM

## 2024-10-14 RX ORDER — ONDANSETRON 4 MG/1
4 TABLET, ORALLY DISINTEGRATING ORAL EVERY 6 HOURS PRN
Status: DISCONTINUED | OUTPATIENT
Start: 2024-10-14 | End: 2024-10-14 | Stop reason: HOSPADM

## 2024-10-14 RX ORDER — KETAMINE HYDROCHLORIDE 10 MG/ML
INJECTION INTRAMUSCULAR; INTRAVENOUS PRN
Status: DISCONTINUED | OUTPATIENT
Start: 2024-10-14 | End: 2024-10-14

## 2024-10-14 RX ORDER — LIDOCAINE 40 MG/G
CREAM TOPICAL
Status: DISCONTINUED | OUTPATIENT
Start: 2024-10-14 | End: 2024-10-14 | Stop reason: HOSPADM

## 2024-10-14 RX ORDER — HEPARIN SODIUM 10000 [USP'U]/100ML
INJECTION, SOLUTION INTRAVENOUS CONTINUOUS PRN
Status: DISCONTINUED | OUTPATIENT
Start: 2024-10-14 | End: 2024-10-14 | Stop reason: HOSPADM

## 2024-10-14 RX ORDER — ONDANSETRON 2 MG/ML
4 INJECTION INTRAMUSCULAR; INTRAVENOUS EVERY 6 HOURS PRN
Status: DISCONTINUED | OUTPATIENT
Start: 2024-10-14 | End: 2024-10-14 | Stop reason: HOSPADM

## 2024-10-14 RX ORDER — IBUPROFEN 600 MG/1
600 TABLET, FILM COATED ORAL EVERY 6 HOURS PRN
Status: DISCONTINUED | OUTPATIENT
Start: 2024-10-14 | End: 2024-10-14 | Stop reason: HOSPADM

## 2024-10-14 RX ORDER — HEPARIN SODIUM 1000 [USP'U]/ML
INJECTION, SOLUTION INTRAVENOUS; SUBCUTANEOUS
Status: DISCONTINUED | OUTPATIENT
Start: 2024-10-14 | End: 2024-10-14 | Stop reason: HOSPADM

## 2024-10-14 RX ORDER — ONDANSETRON 2 MG/ML
INJECTION INTRAMUSCULAR; INTRAVENOUS PRN
Status: DISCONTINUED | OUTPATIENT
Start: 2024-10-14 | End: 2024-10-14

## 2024-10-14 RX ORDER — PROTAMINE SULFATE 10 MG/ML
INJECTION, SOLUTION INTRAVENOUS PRN
Status: DISCONTINUED | OUTPATIENT
Start: 2024-10-14 | End: 2024-10-14

## 2024-10-14 RX ORDER — LIDOCAINE HYDROCHLORIDE AND EPINEPHRINE 10; 10 MG/ML; UG/ML
INJECTION, SOLUTION INFILTRATION; PERINEURAL
Status: DISCONTINUED | OUTPATIENT
Start: 2024-10-14 | End: 2024-10-14 | Stop reason: HOSPADM

## 2024-10-14 RX ORDER — SODIUM CHLORIDE 9 MG/ML
100 INJECTION, SOLUTION INTRAVENOUS CONTINUOUS
Status: DISCONTINUED | OUTPATIENT
Start: 2024-10-14 | End: 2024-10-14 | Stop reason: HOSPADM

## 2024-10-14 RX ORDER — DEXAMETHASONE SODIUM PHOSPHATE 10 MG/ML
INJECTION, SOLUTION INTRAMUSCULAR; INTRAVENOUS PRN
Status: DISCONTINUED | OUTPATIENT
Start: 2024-10-14 | End: 2024-10-14

## 2024-10-14 RX ADMIN — PROTAMINE SULFATE 50 MG: 10 INJECTION, SOLUTION INTRAVENOUS at 08:29

## 2024-10-14 RX ADMIN — PROPOFOL 100 MG: 10 INJECTION, EMULSION INTRAVENOUS at 07:07

## 2024-10-14 RX ADMIN — DEXAMETHASONE SODIUM PHOSPHATE 4 MG: 10 INJECTION, SOLUTION INTRAMUSCULAR; INTRAVENOUS at 07:19

## 2024-10-14 RX ADMIN — ROCURONIUM BROMIDE 50 MG: 50 INJECTION, SOLUTION INTRAVENOUS at 07:08

## 2024-10-14 RX ADMIN — PHENYLEPHRINE HYDROCHLORIDE 0.4 MCG/KG/MIN: 10 INJECTION INTRAVENOUS at 07:21

## 2024-10-14 RX ADMIN — PHENYLEPHRINE HYDROCHLORIDE 100 MCG: 10 INJECTION INTRAVENOUS at 07:21

## 2024-10-14 RX ADMIN — KETAMINE HYDROCHLORIDE 50 MG: 10 INJECTION INTRAMUSCULAR; INTRAVENOUS at 07:07

## 2024-10-14 RX ADMIN — PHENYLEPHRINE HYDROCHLORIDE 100 MCG: 10 INJECTION INTRAVENOUS at 07:53

## 2024-10-14 RX ADMIN — SUGAMMADEX 200 MG: 100 INJECTION, SOLUTION INTRAVENOUS at 08:30

## 2024-10-14 RX ADMIN — ROCURONIUM BROMIDE 25 MG: 50 INJECTION, SOLUTION INTRAVENOUS at 08:12

## 2024-10-14 RX ADMIN — PHENYLEPHRINE HYDROCHLORIDE 100 MCG: 10 INJECTION INTRAVENOUS at 08:21

## 2024-10-14 RX ADMIN — ONDANSETRON 4 MG: 2 INJECTION INTRAMUSCULAR; INTRAVENOUS at 08:24

## 2024-10-14 RX ADMIN — SODIUM CHLORIDE: 9 INJECTION, SOLUTION INTRAVENOUS at 07:48

## 2024-10-14 RX ADMIN — PHENYLEPHRINE HYDROCHLORIDE 50 MCG: 10 INJECTION INTRAVENOUS at 08:18

## 2024-10-14 RX ADMIN — ROCURONIUM BROMIDE 25 MG: 50 INJECTION, SOLUTION INTRAVENOUS at 07:28

## 2024-10-14 RX ADMIN — PHENYLEPHRINE HYDROCHLORIDE 50 MCG: 10 INJECTION INTRAVENOUS at 08:16

## 2024-10-14 RX ADMIN — SODIUM CHLORIDE 100 ML/HR: 9 INJECTION, SOLUTION INTRAVENOUS at 06:47

## 2024-10-14 ASSESSMENT — ACTIVITIES OF DAILY LIVING (ADL)
ADLS_ACUITY_SCORE: 40
ADLS_ACUITY_SCORE: 38
ADLS_ACUITY_SCORE: 40

## 2024-10-14 ASSESSMENT — ENCOUNTER SYMPTOMS: DYSRHYTHMIAS: 1

## 2024-10-14 NOTE — INTERVAL H&P NOTE
I have reviewed the surgical (or preoperative) H&P that is linked to this encounter, and examined the patient. There are no significant changes    Clinical Conditions Present on Arrival:  Clinically Significant Risk Factors Present on Admission         # Hyponatremia: Lowest Na = 129 mmol/L in last 30 days, will monitor as appropriate  # Hypochloremia: Lowest Cl = 94 mmol/L in last 30 days, will monitor as appropriate        # Drug Induced Coagulation Defect: home medication list includes an anticoagulant medication

## 2024-10-14 NOTE — PLAN OF CARE
Pt up ambulating, eating and voiding. Right groin site intact. No complaints. Family at bedside. All appointments made and questions answered. Home with family.

## 2024-10-14 NOTE — ANESTHESIA PROCEDURE NOTES
Airway       Patient location during procedure: OR       Procedure Start/Stop Times: 10/14/2024 7:10 AM  Staff -        CRNA: Lalito Anderson APRN CRNA       Performed By: CRNA  Consent for Airway        Urgency: elective  Indications and Patient Condition       Indications for airway management: amanda-procedural       Induction type:intravenous       Mask difficulty assessment: 1 - vent by mask    Final Airway Details       Final airway type: endotracheal airway       Successful airway: ETT - single and Oral  Endotracheal Airway Details        ETT size (mm): 7.0       Cuffed: yes       Successful intubation technique: video laryngoscopy       VL Blade Size: Glidescope 3       Grade View of Cords: 1       Adjucts: stylet       Position: Right       Measured from: lips       Secured at (cm): 19       Bite block used: Molar    Post intubation assessment        Number of attempts at approach: 1       Number of other approaches attempted: 0       Secured with: silk tape       Ease of procedure: easy       Dentition: Intact and Unchanged    Medication(s) Administered   Medication Administration Time: 10/14/2024 7:10 AM

## 2024-10-14 NOTE — ANESTHESIA POSTPROCEDURE EVALUATION
Patient: Adilia Eric    Procedure: Procedure(s):  Ablation Atrial Fibrillation       Anesthesia Type:  General    Note:  Disposition: Outpatient   Postop Pain Control: Uneventful            Sign Out: Well controlled pain   PONV: No   Neuro/Psych: Uneventful            Sign Out: Acceptable/Baseline neuro status   Airway/Respiratory: Uneventful            Sign Out: Acceptable/Baseline resp. status   CV/Hemodynamics: Uneventful            Sign Out: Acceptable CV status; No obvious hypovolemia; No obvious fluid overload   Other NRE: NONE   DID A NON-ROUTINE EVENT OCCUR? No           Last vitals:  Vitals Value Taken Time   /69 10/14/24 1130   Temp     Pulse 129 10/14/24 1138   Resp 123 10/14/24 1138   SpO2 91 % 10/14/24 1138   Vitals shown include unfiled device data.    Electronically Signed By: Madelin James MD  October 14, 2024  3:23 PM

## 2024-10-14 NOTE — DISCHARGE INSTRUCTIONS
[facility]  Cardiac Electrophysiology  [address]  [place] [address]   Office: [phone]  Fax: [phone]     Cardiac Electrophysiology - Post Ablation Discharge Instructions      PROCEDURE   Atrial fibrillation ablation         MEDICATION INSTRUCTIONS   Continue taking all home meds  Continue taking your blood thinner Eliquis.          DISCHARGE INSTRUCTIONS   General instructions  Have an adult stay with you until tomorrow.  You may resume your normal diet.    You may shower tomorrow.  Do NOT take a bath, or use a hot tub or pool for at least 1 week. Do not scrub the site. Do not use lotion or powder near the puncture site.    Groin care instructions  For the first 24 hrs - check the puncture site every 1-2 hours while awake.  You may keep a bandaid over the puncture sites for 1 or 2 days post-procedure and thereafter may keep these sites uncovered.  Change the bandaid daily.  If there is minor oozing, apply another bandaid and remove it after 12 hours.  For 2 days, when you cough, sneeze, laugh or move your bowels, hold your hand over the puncture site and press firmly.  Mild bruising at the access sites is normal.  If you notice increased swelling, external bleeding, or have other concerns regarding your access sites please consider emergency department evaluation and call your electrophysiology team's office    Activity recommendations  Do not drive for 3 days.  Avoid stooping or squatting more than 90 degrees at the hips for 7 days  Avoid repetitive motions such as loading , vacuuming, raking or shoveling  Avoid heavy lifting (greater than 25lbs) for 1 week    Post ablation instructions  You may have some irregular heartbeats following your ablation.  These may feel very strong and feel like atrial fibrillation re-initiation is imminent - these episodes should occur less frequently over time.  Recurrent atrial fibrillation can occur within the first 3  months post ablation while your heart recovers from the procedure.  Pleuritic chest discomfort (chest pain worse with taking deep breaths, worse with laying flat on your back) can occur after ablation, usually coming about within the first 24-48hrs post ablation.  If this occurs and is severe enough to be troublesome to you, please call us and consider starting a course of ibuprofen 400mg three times daily for 5 to 7 days    Things to watch for  As with any type of procedure, please be more attentive to unusual symptoms post ablation (eg. fever, neurologic changes, pain with swallowing, loss of consciousness, etc) - we recommend ER evaluation for any such symptoms in the first few weeks post procedure.    Consider ER evaluation for the following:  Severe chest pain not relieved by Tylenol or Ibuprofen  You have chills or a fever greater than 101 F (38 C)  Neurologic changes (eg. leg, arm or face weakness or numbness, difficulties with speech or word finding, problems walking or with your balance, vision changes)  Severe difficulty swallowing and/or you are coughing up blood  Shortness of breath  Increased groin pain or a large or growing hard lump around the site  Groin is red, swollen, hot or tender  Blood or fluid is draining from the groin site  Any numbness, coolness or changes in color in your extremities  Groin pain not relieved by Tylenol or Advil  Recurrent atrial fibrillation associated with sustained rapid heart rates or associated with additional concerning symptoms.    Our office will have a follow-up visit scheduled for you in approximately 6 weeks.  Please do not hesitate to call us before that time should issues arise.        [facility]    [phone]    If you are calling after hours, please listen to the entire voicemail,   a live  will answer at the end of the message.    [phone] to reach the EP nurses working with Dr Max

## 2024-10-14 NOTE — ANESTHESIA CARE TRANSFER NOTE
Patient: Adilia Eric    Procedure: Procedure(s):  Ablation Atrial Fibrillation       Diagnosis: paroxysmal atrial fibrillation  Diagnosis Additional Information: No value filed.    Anesthesia Type:   General     Note:    Oropharynx: oropharynx clear of all foreign objects  Level of Consciousness: drowsy  Oxygen Supplementation: face mask  Level of Supplemental Oxygen (L/min / FiO2): 6  Independent Airway: airway patency satisfactory and stable  Dentition: dentition unchanged  Vital Signs Stable: post-procedure vital signs reviewed and stable  Report to RN Given: handoff report given  Patient transferred to: Cardiac Special Care          Vitals:  Vitals Value Taken Time   BP     Temp     Pulse     Resp     SpO2         Electronically Signed By: PARVIN Leonardo CRNA  October 14, 2024  8:46 AM

## 2024-10-14 NOTE — Clinical Note
Arrhythmia Type: atrial fibrillation.   Method of Cardioversion: synchronous.   The arrhythmia was terminated.   Energy shock delivered: 250 joules.   Time shock delivered: 08:09 CDT.   Post cardioversion rhythm: sinus rhythm.

## 2024-10-14 NOTE — ANESTHESIA PREPROCEDURE EVALUATION
Anesthesia Pre-Procedure Evaluation    Patient: Adilia Eric   MRN: [id number] : 1940        Procedure : Procedure(s):  Ablation Atrial Fibrillation          Past Medical History:   Diagnosis Date    Abdominal aortic aneurysm (AAA) without rupture, unspecified part (H) 04/10/2024    Acute heart failure with preserved ejection fraction (H) 2024    Depression     Diabetes mellitus (H)     GERD (gastroesophageal reflux disease)     Hyperlipemia     Hypertension     Intracranial hemorrhage (H) 2011    may be amyloid angiopathy; has been stable.    Mastoiditis 2011    Near syncope 2020    Osteoarthritis     Paroxysmal atrial fibrillation (H) 2023    TMJ (temporomandibular joint disorder)       Past Surgical History:   Procedure Laterality Date    CYST REMOVAL      tongue    HYSTERECTOMY  1996    JOINT REPLACEMENT Right 2017    OOPHORECTOMY Bilateral 1996      Allergies   Allergen Reactions    Seasonal Allergies       Social History     Tobacco Use    Smoking status: Former    Smokeless tobacco: Never   Substance Use Topics    Alcohol use: Not Currently     Alcohol/week: 1.7 standard drinks of alcohol     Types: 2 Standard drinks or equivalent per week      Wt Readings from Last 1 Encounters:   10/14/24 54.8 kg (120 lb 12.8 oz)        Anesthesia Evaluation   Pt has had prior anesthetic.     No history of anesthetic complications       ROS/MED HX  ENT/Pulmonary:       Neurologic:     (+)          CVA,    TIA,                  Cardiovascular:     (+)  hypertension- -   -  - -                        dysrhythmias,              METS/Exercise Tolerance:     Hematologic:       Musculoskeletal:       GI/Hepatic:     (+) GERD,                   Renal/Genitourinary:       Endo:     (+) type I DM,                     Psychiatric/Substance Use:       Infectious Disease:       Malignancy:       Other:            Physical Exam    Airway        Mallampati: II    Neck ROM: full  "    Respiratory Devices and Support         Dental       (+) Minor Abnormalities - some fillings, tiny chips      Cardiovascular          Rhythm and rate: irregular     Pulmonary   pulmonary exam normal                OUTSIDE LABS:  CBC:   Lab Results   Component Value Date    WBC 10.7 10/14/2024    WBC 9.4 10/04/2024    HGB 14.3 10/14/2024    HGB 16.0 (H) 10/04/2024    HCT 42.4 10/14/2024    HCT 46.9 10/04/2024     10/14/2024     10/04/2024     BMP:   Lab Results   Component Value Date     (L) 10/04/2024     04/11/2024    POTASSIUM 4.3 10/04/2024    POTASSIUM 3.6 04/11/2024    CHLORIDE 94 (L) 10/04/2024    CHLORIDE 99 04/11/2024    CO2 23 10/04/2024    CO2 24 04/11/2024    BUN 9.5 10/04/2024    BUN 4.0 (L) 04/11/2024    CR 0.53 10/04/2024    CR 0.49 (L) 04/11/2024     (H) 10/04/2024     (H) 04/11/2024     COAGS: No results found for: \"PTT\", \"INR\", \"FIBR\"  POC: No results found for: \"BGM\", \"HCG\", \"HCGS\"  HEPATIC:   Lab Results   Component Value Date    ALBUMIN 4.3 03/22/2024    PROTTOTAL 6.9 03/22/2024    ALT 21 03/22/2024    AST 29 03/22/2024    ALKPHOS 134 03/22/2024    BILITOTAL 1.7 (H) 03/22/2024     OTHER:   Lab Results   Component Value Date    LACT 0.7 04/10/2024    A1C 6.0 (H) 04/10/2024    MARCELO 9.6 10/04/2024    PHOS 3.2 01/11/2022    MAG 1.7 04/11/2024    TSH 0.65 04/10/2024       Anesthesia Plan    ASA Status:  3       Anesthesia Type: General.     - Airway: ETT              Consents    Anesthesia Plan(s) and associated risks, benefits, and realistic alternatives discussed. Questions answered and patient/representative(s) expressed understanding.     - Discussed: Risks, Benefits and Alternatives for the PROCEDURE were discussed     - Discussed with:  Patient      - Extended Intubation/Ventilatory Support Discussed: No.      - Patient is DNR/DNI Status: No     Use of blood products discussed: No .     Postoperative Care    Pain management: Multi-modal analgesia. "   PONV prophylaxis: Ondansetron (or other 5HT-3), Dexamethasone or Solumedrol     Comments:               Madelin James MD    I have reviewed the pertinent notes and labs in the chart from the past 30 days and (re)examined the patient.  Any updates or changes from those notes are reflected in this note.            # Drug Induced Coagulation Defect: home medication list includes an anticoagulant medication    # Hypertension: Noted on problem list

## 2024-10-14 NOTE — PLAN OF CARE
Ablation for atrial fibrillation. Chronic neck pain.  Family present.  No questions

## 2024-10-17 ENCOUNTER — VIRTUAL VISIT (OUTPATIENT)
Dept: CARDIOLOGY | Facility: CLINIC | Age: 84
End: 2024-10-17
Payer: COMMERCIAL

## 2024-10-17 DIAGNOSIS — Z86.79 STATUS POST ABLATION OF ATRIAL FIBRILLATION: Primary | ICD-10-CM

## 2024-10-17 DIAGNOSIS — Z98.890 STATUS POST ABLATION OF ATRIAL FIBRILLATION: Primary | ICD-10-CM

## 2024-10-17 PROCEDURE — 99207 PR NO CHARGE NURSE ONLY: CPT | Mod: 93

## 2024-10-17 NOTE — PATIENT INSTRUCTIONS
Instructions Following your Ablation Procedure    Your anticoagulation medication Eliquis:  It is important to remain on your anticoagulation medication uninterrupted after your ablation to reduce your risk of a stroke or heart attack, do not stop this medication  Please contact me if you have any questions regarding your anticoagulation medication    Groin care instructions  Keep the site clean and dry, do not place a bandage over the site. If there is minor oozing, apply another bandaid and remove it after 12 hours.  Mild bruising at the access sites is normal. If you notice increased swelling, external bleeding, or have other concerns regarding your access sites please consider emergency department evaluation for significant changes and call your electrophysiology team's office.  You may experience mild discomfort at your groin sites, applying ice packs 20min 3-4 times a day can help alleviate this discomfort.    Activity recommendations  You can resume driving.  Avoid stooping or squatting more than 90 degrees at the hips, repetitive motions such as loading , vacuuming, raking or shoveling, and heavy lifting (greater than 25lbs) for 1 week  Increase your activity gradually over the next 5-10 days, working back to your normal daily activity/routine.    Post ablation instructions  Stay well hydrated, and increase your fluid intake during this recovery period  High protein foods aide in your bodies healing process  You may have some irregular heartbeats and/or atrial fibrillation following your ablation which is normal to recovery, these episodes should occur less frequently over time.   Recurrent atrial fibrillation can occur within the first 3 months post ablation while your heart recovers from the procedure. Please call the electrophysiology team's office if you have an episode lasting greater than 4 hours, or if you notice the episodes are increasing in frequency or duration  Pleuritic chest  discomfort (chest pain worse with taking deep breaths, worse with laying flat on your back) can occur after ablation, usually coming about within the first 24-48hrs post ablation. If this occurs and is severe enough to be troublesome to you, please call us and consider starting a course of ibuprofen 400mg three times daily for 5 to 7 days    Things to watch for  As with any type of procedure, please be more attentive to unusual symptoms post ablation (eg. fever, neurologic changes, pain with swallowing, loss of consciousness, etc) - we recommend ER evaluation for any such symptoms in the first few weeks post procedure.    Consider ER evaluation for the following:  Severe chest pain not relieved by Tylenol or Ibuprofen  You have chills or a fever greater than 101 F (38 C)  Neurologic changes (eg. leg, arm or face weakness or numbness, difficulties with speech or word finding, problems  walking or with your balance, vision changes)  Severe difficulty swallowing and/or you are coughing up blood  Shortness of breath  Increased groin pain or a large or growing hard lump around the site  Groin is red, swollen, hot or tender  Blood or fluid is draining from the groin site  Any numbness, coolness or changes in color in your extremities  Groin pain not relieved by Tylenol or Advil  Recurrent atrial fibrillation associated with sustained rapid heart rates or associated with additional concerning  symptoms.    Your follow up appointments are as follows:  You will be seen by the electrophysiologist nurse practitioner at 6 weeks after your ablation  At your 6 week appointment, a 3 month follow-up appointment will be arranged with either the Nurse Practitioner or the Electrophysiology provider     Sincerely,  Padmini Portillo RN [phone]    After hours please contact the on call service at # [phone]

## 2024-10-17 NOTE — PROGRESS NOTES
Post PVI Procedural Follow Up Call    Pt is s/p PVI from 10/14 with Dr Suarez  PC was placed to pt, spoke to pt    General Assessment:     Weight: Pt reports pt is unable to report weight, but denies s/s of fluid retention    Pain: Pt denies generalized or localized pain abnormal to healing s/p     /GI: Pt denies difficulty swallowing, denies constipation, denies urinary retention/difficulty, reports no s/s of infection, report normal appetite, and reports staying hydrated.    Neurological: Pt Denies any neurological changes, or s/s of CVA    Respiratory: Pt denies SOB, denies difficulty breathing, denies throat pain, denies changes/abnormal sputum, and denies any further symptoms abnormal to normal healing process s/p PVI.    Activity: Pt is tolerating advancement in activity while following physical restrictions, staying well hydrated, and gradually working into baseline activity.     Rhythm Assessment:   Pt denies palpitations, denies irregularities in HR or rhythm, and denies symptoms or sustained AF episodes.    Procedure Site Assessment:   Pts no visible/physical changes in groin sites, some bruising around sites without significant change from hospital discharge and normal to PVI recovery, and small pea/dime size hardening under suture sites normal to PVI recovery    Anticoagulation/Medication:  Pt remain on Eliquis without interruption  Per guidelines by Dr Suleman agosto ASA needed upon discharge    Education completed with pt at this visit:  Reviewed normal post-op PVI healing process, when to contact EP-RN/EP-MD, contact information was given to the pt for further concerns or questions, and pt verbalized understanding    Follow up  Pts AVS was printed and mailed to pt by scheduling team and pt will be seen by EP NP at 6 wks, monitor will be ordered at this follow-up if indicated as well as 3mo follow-up with either EP PADMINI or JOSE ELIZABETH    10/17/2024 8:53 AM  Padmini Portillo RN

## 2024-11-25 ENCOUNTER — OFFICE VISIT (OUTPATIENT)
Dept: CARDIOLOGY | Facility: CLINIC | Age: 84
End: 2024-11-25
Payer: COMMERCIAL

## 2024-11-25 VITALS
BODY MASS INDEX: 23.95 KG/M2 | DIASTOLIC BLOOD PRESSURE: 66 MMHG | HEART RATE: 92 BPM | SYSTOLIC BLOOD PRESSURE: 122 MMHG | HEIGHT: 60 IN | WEIGHT: 122 LBS | RESPIRATION RATE: 17 BRPM

## 2024-11-25 DIAGNOSIS — I48.0 PAROXYSMAL ATRIAL FIBRILLATION (H): Primary | ICD-10-CM

## 2024-11-25 DIAGNOSIS — I10 PRIMARY HYPERTENSION: ICD-10-CM

## 2024-11-25 PROCEDURE — G2211 COMPLEX E/M VISIT ADD ON: HCPCS | Performed by: NURSE PRACTITIONER

## 2024-11-25 PROCEDURE — 99214 OFFICE O/P EST MOD 30 MIN: CPT | Performed by: NURSE PRACTITIONER

## 2024-11-25 RX ORDER — LANOLIN ALCOHOL/MO/W.PET/CERES
1 CREAM (GRAM) TOPICAL
COMMUNITY
Start: 2024-11-14 | End: 2024-11-25

## 2024-11-25 NOTE — PROGRESS NOTES
HEART CARE ELECTROPHYSIOLOGY NOTE      [facility]  [phone]      Assessment/Recommendations   Assessment/Plan:  1.  Paroxysmal Atrial Fibrillation: Symptomatology or evidence of AF recurrence.  She has had an uneventful recovery from ablation with no emergency department or unscheduled clinic visits.  -- Discontinue sotalol     She has a [id number] score of 9 for age >75, female gender, HTN, HF, CAD, DM 2, TIA.  She also has an increased risk for bleed due to gait instability and history of SAH.  HAS-BLED score 3 for age, TIA, bleeding issue.  She may be a candidate for left atrial appendage closure.    -- Continue Eliquis 2.5 mg twice daily for stroke prophylaxis (age >80, weight <60 kg).     2.  Acute heart failure with preserved ejection fraction: Secondary to RVR.  No fluid retention on exam or heart failure symptoms.        3.  Hypertension: Controlled with amlodipine, lisinopril    Follow-up 3 months post ablation      History of Present Illness/Subjective    HPI: Adilia Eric is a 83 year old female who comes in today accompanied by her grandson for EP follow up of atrial fibrillation.   She has a history of paroxysmal atrial fibrillation, hypertension, mild nonobstructive coronary artery disease on angiography in 2013, hyponatremia, type 2 diabetes, remote history of possible TIA, intracranial hemorrhage 2011 without recurrence, abdominal aortic aneurysm (3.3 cm), possible obstructive sleep apnea.     Hospitalized 4/10/2024 for dehydration secondary to ongoing diarrhea.  Diagnosed with diverticulitis and started on antibiotics.  During her brief hospitalization, she had sinus tachycardia due to dehydration which resolved, but no A-fib.    Arrhythmia history  Dx/date: Paroxysmal atrial fibrillation diagnosed January 2023.  She was seen in the ED on 2/5/2024 for progressive shortness of breath, wheezing, and fatigue for a little over a week.  She was noted to be in AF-RVR.  Rates  did not slow with metoprolol, so she underwent urgent cardioversion.  Sx: Fatigue, progressive shortness of breath, acute HFpEF  [id number] score: 9 for age >75, female gender, HTN, HF, CAD, DM 2, TIA  HAS-BLED score 3 for age, TIA, bleeding issue.  Oral anticoagulation: Eliquis 2.5 mg twice daily, dose adjusted for age >80 and weight <60 kg  Antiarrhythmic medications, AV caitlyn blocking agents: Sotalol 80 mg twice daily, side effects-SOB (2/10/2024-present)  Procedures  DCCV: 2/5/2024 (ED)  Ablation: 10/14/2024 by Dr. Max (RF PVI)     Adilia states she feels well.  She has not had any A-fib since ablation.  She reports an uneventful recovery from ablation with no issues, significant heartburn, difficulty swallowing, or neurologic changes.  She has some mild swelling in her left ankle that is gone by morning.  She has a baseline of dyspnea on exertion.  She denies chest discomfort, palpitations, significant peripheral edema, shortness of breath at rest, paroxysmal nocturnal dyspnea, orthopnea, lightheadedness, dizziness, pre-syncope, or syncope.     Cardiographics (EKG personally reviewed):  EKG done 10/14/2024 shows sinus tachycardia at 138 bpm, QRS 66 ms, QT/QTc 354/536 ms  EKG done 10/4/2024 shows sinus rhythm at 72 bpm, PACs, QRS 70 ms, QT/QTc 386/422 ms  EKG done 4/10/2024 shows sinus rhythm at 98 bpm, QRS 70 ms, QT/QTc 326/460 ms  EKG done 2/5/2024 shows atrial fibrillation with rapid ventricular response at 118 bpm.  Subsequent EKG shows sinus rhythm at 81 bpm, QRS 66 ms, QT/QTc 348/404 ms  EKG done 1/20/2023 shows atrial fibrillation with rapid ventricular response at 126 bpm     ECHO done 2/17/2023:  Left ventricular size, wall motion and function are normal. The ejection  fraction is 55-60%.  Normal right ventricle size and systolic function.  Both atria are of normal size.  No hemodynamically significant valvular abnormalities on 2D or color flow  imaging.  There is no comparison study  available.     NM stress test done 5/18/2022:    Lexiscan stress ECG negative for ischemia.    The nuclear stress test is negative for inducible myocardial ischemia or infarction.    The left ventricular ejection fraction at stress is greater than 70%.    A prior study was conducted on 4/16/2019.  No interval change.    I have reviewed and updated the patient's Past Medical History, Social History, Family History and Medication List.  Outside records personally reviewed.     Physical Examination  Review of Systems   Vitals: /66 (BP Location: Right arm, Patient Position: Sitting, Cuff Size: Adult Regular)   Pulse 92   Resp 17   Ht 1.524 m (5')   Wt 55.3 kg (122 lb)   BMI 23.83 kg/m    BMI= Body mass index is 23.83 kg/m .  Wt Readings from Last 3 Encounters:   11/25/24 55.3 kg (122 lb)   10/14/24 54.8 kg (120 lb 12.8 oz)   10/04/24 55.3 kg (122 lb)       General Appearance:   Alert, well-appearing and in no acute distress.   HEENT: Atraumatic, normocephalic.  No scleral icterus, normal conjunctivae, EOMs intact, PERRL.  Mucous membranes pink and moist.     Chest/Lungs:   Chest symmetric, spine straight.  Respirations unlabored.  Lungs are clear to auscultation.   Cardiovascular:   Regular rate and rhythm.  Normal first and second heart sounds with no murmurs, rubs, or gallops; radial and posterior tibial pulses are intact, No edema.   Abdomen:  Soft, nondistended.   Extremities: No cyanosis or clubbing.   Musculoskeletal: Moves all extremities.     Skin: Warm, dry, intact.    Neurologic: Mood and affect are appropriate.  Alert and oriented to person, place, time, and situation.     ROS: 10 point ROS neg other than the symptoms noted above in the HPI.         Medical History  Surgical History Family History Social History   Past Medical History:   Diagnosis Date    Abdominal aortic aneurysm (AAA) without rupture, unspecified part (H) 04/10/2024    Acute heart failure with preserved ejection fraction (H)  02/07/2024    Depression     Diabetes mellitus (H)     GERD (gastroesophageal reflux disease)     Hyperlipemia     Hypertension     Intracranial hemorrhage (H) 01/01/2011    may be amyloid angiopathy; has been stable.    Mastoiditis 01/01/2011    Near syncope 07/26/2020    Osteoarthritis     Paroxysmal atrial fibrillation (H) 04/21/2023    TMJ (temporomandibular joint disorder)      Past Surgical History:   Procedure Laterality Date    CYST REMOVAL      tongue    EP ABLATION PULMONARY VEIN ISOLATION N/A 10/14/2024    Procedure: Ablation Atrial Fibrillation;  Surgeon: Gokul Max MD;  Location: [facility]    HYSTERECTOMY  01/01/1996    JOINT REPLACEMENT Right 2017    OOPHORECTOMY Bilateral 01/01/1996     Family History   Problem Relation Age of Onset    Heart Disease Father     No Known Problems Mother     No Known Problems Sister     No Known Problems Daughter     No Known Problems Maternal Grandmother     No Known Problems Maternal Grandfather     No Known Problems Paternal Grandmother     No Known Problems Paternal Grandfather     No Known Problems Maternal Aunt     No Known Problems Paternal Aunt     Hereditary Breast and Ovarian Cancer Syndrome No family hx of     Breast Cancer No family hx of     Cancer No family hx of     Colon Cancer No family hx of     Endometrial Cancer No family hx of     Ovarian Cancer No family hx of         Social History     Socioeconomic History    Marital status:      Spouse name: Not on file    Number of children: Not on file    Years of education: Not on file    Highest education level: Not on file   Occupational History    Not on file   Tobacco Use    Smoking status: Former    Smokeless tobacco: Never   Substance and Sexual Activity    Alcohol use: Not Currently     Alcohol/week: 1.7 standard drinks of alcohol     Types: 2 Standard drinks or equivalent per week    Drug use: No    Sexual activity: Not on file   Other Topics Concern    Not on file   Social  History Narrative    Not on file     Social Drivers of Health     Financial Resource Strain: Low Risk  (12/29/2023)    Received from [facility], [facility]    Financial Resource Strain     Difficulty of Paying Living Expenses: 3     Difficulty of Paying Living Expenses: Not on file   Food Insecurity: No Food Insecurity (12/29/2023)    Received from [facility]    Food Insecurity     Do you worry your food will run out before you are able to buy more?: 1   Transportation Needs: No Transportation Needs (12/29/2023)    Received from [facility]    Transportation Needs     Does lack of transportation keep you from medical appointments?: 1     Does lack of transportation keep you from work, meetings or getting things that you need?: 1   Physical Activity: Not on file   Stress: Not on file   Social Connections: Socially Integrated (12/29/2023)    Received from [facility]    Social Connections     Do you often feel lonely or isolated from those around you?: 0   Interpersonal Safety: Low Risk  (10/14/2024)    Interpersonal Safety     Do you feel physically and emotionally safe where you currently live?: Yes     Within the past 12 months, have you been hit, slapped, kicked or otherwise physically hurt by someone?: No     Within the past 12 months, have you been humiliated or emotionally abused in other ways by your partner or ex-partner?: No   Housing Stability: Low Risk  (12/29/2023)    Received from [facility]    Housing Stability     What is your housing situation today?: 1           Medications  Allergies   Current Outpatient Medications   Medication Sig Dispense Refill    acetaminophen (TYLENOL) 500 MG tablet Take 1,000 mg by mouth 2 times daily as needed      alendronate (FOSAMAX) 70 MG tablet Take 70 mg by mouth every 7  days      allopurinol (ZYLOPRIM) 100 MG tablet Take 100 mg by mouth daily      amLODIPine (NORVASC) 5 MG tablet Take 1 tablet by mouth daily at 2 pm      apixaban ANTICOAGULANT (ELIQUIS ANTICOAGULANT) 2.5 MG tablet Take 1 tablet (2.5 mg) by mouth 2 times daily 180 tablet 2    atorvastatin (LIPITOR) 40 MG tablet [ATORVASTATIN (LIPITOR) 40 MG TABLET] Take 40 mg by mouth bedtime.      baclofen (LIORESAL) 10 MG tablet [BACLOFEN (LIORESAL) 10 MG TABLET] Take 10 mg by mouth at bedtime.       calcium carbonate (OS-MARCELO) 600 mg calcium (1,500 mg) tablet [CALCIUM CARBONATE (OS-MARCELO) 600 MG CALCIUM (1,500 MG) TABLET] Take 1 tablet by mouth daily.       celecoxib (CELEBREX) 200 MG capsule [CELECOXIB (CELEBREX) 200 MG CAPSULE] Take 200 mg by mouth daily.      cetirizine (ZYRTEC) 10 MG tablet Take 10 mg by mouth daily      CONTOUR NEXT TEST test strip 2 times daily      cyanocobalamin 1000 MCG tablet [CYANOCOBALAMIN 1000 MCG TABLET] Take 1,000 mcg by mouth daily.      hydrOXYzine (ATARAX) 25 MG tablet TAKE 1 TABLET BY MOUTH UP TO 3 TIMES DAILY AS NEEDED FOR ANXIETY      lactobacillus combo no.11 15 billion cell CpSP Take 1 capsule by mouth daily      lisinopril (ZESTRIL) 20 MG tablet TAKE 1 TABLET BY MOUTH TWICE A DAY FOR BLOOD PRESSURE      LORazepam (ATIVAN) 0.5 MG tablet [LORAZEPAM (ATIVAN) 0.5 MG TABLET] Take 0.5 mg by mouth 2 (two) times a day as needed.       magnesium oxide (MAG-OX) 400 mg tablet [MAGNESIUM OXIDE (MAG-OX) 400 MG TABLET] Take 400 mg by mouth daily.      metFORMIN (GLUCOPHAGE-XR) 500 MG 24 hr tablet [METFORMIN (GLUCOPHAGE-XR) 500 MG 24 HR TABLET] TAKE 1 TABLET BY MOUTH EVERY DAY FOR DIABETES  1    Microlet Lancets MISC TEST BLOOD SUGAR 2 X'S DAILY DX: E11.9      sertraline (ZOLOFT) 25 MG tablet Take 25 mg by mouth daily      vitamin D3 (CHOLECALCIFEROL) 50 mcg (2000 units) tablet Take 1 tablet by mouth daily         Allergies   Allergen Reactions    Seasonal Allergies         Denies previous adverse reaction  to anesthesia      Lab Results    Chemistry/lipid CBC Cardiac Enzymes/BNP/TSH/INR   Recent Labs   Lab Test 05/11/23  1000 05/16/22  0950   CHOL 117 115   HDL 51 44*   LDL 43 50   TRIG 113 106      Recent Labs   Lab Test 10/14/24  0611 10/04/24  0915   * 129*   POTASSIUM 3.5 4.3   CHLORIDE 98 94*   CO2 23 23   ANIONGAP 13 12   * 123*   BUN 8.9 9.5   CR 0.58 0.53   MARCELO 9.9 9.6      CBC RESULTS:   Recent Labs   Lab Test 10/14/24  0611   WBC 10.7   RBC 4.61   HGB 14.3   HCT 42.4   MCV 92   MCH 31.0   MCHC 33.7   RDW 13.3           TSH   Date Value Ref Range Status   04/10/2024 0.65 0.30 - 4.20 uIU/mL Final   12/15/2021 0.64 0.30 - 5.00 uIU/mL Final            The longitudinal plan of care for the diagnosis(es)/condition(s) as documented were addressed during this visit. Due to the added complexity in care, I will continue to support Adilia in the subsequent management and with ongoing continuity of care.

## 2024-11-25 NOTE — PATIENT INSTRUCTIONS
Adilia Eric,    It was a pleasure to see you today at the [facility].     My recommendations after this visit include:    Sotalol: take 1/2 tab twice daily until gone     Follow up in January (3 months post ablation)    Tavia Maxwell, CNP  [facility], Electrophysiology  [phone]  EP nurses [phone]

## 2024-11-25 NOTE — LETTER
11/25/2024    PARVIN HERNANDEZ CNP  [facility] [address]  [facility] [address]    RE: Adilia Eric       Dear Colleague,     I had the pleasure of seeing Adilia Eric in the [facility] [place].    HEART CARE ELECTROPHYSIOLOGY NOTE      [facility] [place]  [phone]      Assessment/Recommendations   Assessment/Plan:  1.  Paroxysmal Atrial Fibrillation: Symptomatology or evidence of AF recurrence.  She has had an uneventful recovery from ablation with no emergency department or unscheduled clinic visits.  -- Discontinue sotalol     She has a [id number] score of 9 for age >75, female gender, HTN, HF, CAD, DM 2, TIA.  She also has an increased risk for bleed due to gait instability and history of SAH.  HAS-BLED score 3 for age, TIA, bleeding issue.  She may be a candidate for left atrial appendage closure.    -- Continue Eliquis 2.5 mg twice daily for stroke prophylaxis (age >80, weight <60 kg).     2.  Acute heart failure with preserved ejection fraction: Secondary to RVR.  No fluid retention on exam or heart failure symptoms.        3.  Hypertension: Controlled with amlodipine, lisinopril    Follow-up 3 months post ablation      History of Present Illness/Subjective    HPI: Adilia Eric is a 83 year old female who comes in today accompanied by her grandson for EP follow up of atrial fibrillation.   She has a history of paroxysmal atrial fibrillation, hypertension, mild nonobstructive coronary artery disease on angiography in 2013, hyponatremia, type 2 diabetes, remote history of possible TIA, intracranial hemorrhage 2011 without recurrence, abdominal aortic aneurysm (3.3 cm), possible obstructive sleep apnea.     Hospitalized 4/10/2024 for dehydration secondary to ongoing diarrhea.  Diagnosed with diverticulitis and started on antibiotics.  During her brief hospitalization, she had sinus tachycardia due to dehydration which resolved, but no  A-fib.    Arrhythmia history  Dx/date: Paroxysmal atrial fibrillation diagnosed January 2023.  She was seen in the ED on 2/5/2024 for progressive shortness of breath, wheezing, and fatigue for a little over a week.  She was noted to be in AF-RVR.  Rates did not slow with metoprolol, so she underwent urgent cardioversion.  Sx: Fatigue, progressive shortness of breath, acute HFpEF  [id number] score: 9 for age >75, female gender, HTN, HF, CAD, DM 2, TIA  HAS-BLED score 3 for age, TIA, bleeding issue.  Oral anticoagulation: Eliquis 2.5 mg twice daily, dose adjusted for age >80 and weight <60 kg  Antiarrhythmic medications, AV caitlyn blocking agents: Sotalol 80 mg twice daily, side effects-SOB (2/10/2024-present)  Procedures  DCCV: 2/5/2024 (ED)  Ablation: 10/14/2024 by Dr. Max (RF PVI)     Adilia states she feels well.  She has not had any A-fib since ablation.  She reports an uneventful recovery from ablation with no issues, significant heartburn, difficulty swallowing, or neurologic changes.  She has some mild swelling in her left ankle that is gone by morning.  She has a baseline of dyspnea on exertion.  She denies chest discomfort, palpitations, significant peripheral edema, shortness of breath at rest, paroxysmal nocturnal dyspnea, orthopnea, lightheadedness, dizziness, pre-syncope, or syncope.     Cardiographics (EKG personally reviewed):  EKG done 10/14/2024 shows sinus tachycardia at 138 bpm, QRS 66 ms, QT/QTc 354/536 ms  EKG done 10/4/2024 shows sinus rhythm at 72 bpm, PACs, QRS 70 ms, QT/QTc 386/422 ms  EKG done 4/10/2024 shows sinus rhythm at 98 bpm, QRS 70 ms, QT/QTc 326/460 ms  EKG done 2/5/2024 shows atrial fibrillation with rapid ventricular response at 118 bpm.  Subsequent EKG shows sinus rhythm at 81 bpm, QRS 66 ms, QT/QTc 348/404 ms  EKG done 1/20/2023 shows atrial fibrillation with rapid ventricular response at 126 bpm     ECHO done 2/17/2023:  Left ventricular size, wall motion and function  are normal. The ejection  fraction is 55-60%.  Normal right ventricle size and systolic function.  Both atria are of normal size.  No hemodynamically significant valvular abnormalities on 2D or color flow  imaging.  There is no comparison study available.     NM stress test done 5/18/2022:     Lexiscan stress ECG negative for ischemia.     The nuclear stress test is negative for inducible myocardial ischemia or infarction.     The left ventricular ejection fraction at stress is greater than 70%.     A prior study was conducted on 4/16/2019.  No interval change.    I have reviewed and updated the patient's Past Medical History, Social History, Family History and Medication List.  Outside records personally reviewed.     Physical Examination  Review of Systems   Vitals: /66 (BP Location: Right arm, Patient Position: Sitting, Cuff Size: Adult Regular)   Pulse 92   Resp 17   Ht 1.524 m (5')   Wt 55.3 kg (122 lb)   BMI 23.83 kg/m    BMI= Body mass index is 23.83 kg/m .  Wt Readings from Last 3 Encounters:   11/25/24 55.3 kg (122 lb)   10/14/24 54.8 kg (120 lb 12.8 oz)   10/04/24 55.3 kg (122 lb)       General Appearance:   Alert, well-appearing and in no acute distress.   HEENT: Atraumatic, normocephalic.  No scleral icterus, normal conjunctivae, EOMs intact, PERRL.  Mucous membranes pink and moist.     Chest/Lungs:   Chest symmetric, spine straight.  Respirations unlabored.  Lungs are clear to auscultation.   Cardiovascular:   Regular rate and rhythm.  Normal first and second heart sounds with no murmurs, rubs, or gallops; radial and posterior tibial pulses are intact, No edema.   Abdomen:  Soft, nondistended.   Extremities: No cyanosis or clubbing.   Musculoskeletal: Moves all extremities.     Skin: Warm, dry, intact.    Neurologic: Mood and affect are appropriate.  Alert and oriented to person, place, time, and situation.     ROS: 10 point ROS neg other than the symptoms noted above in the HPI.          Medical History  Surgical History Family History Social History   Past Medical History:   Diagnosis Date     Abdominal aortic aneurysm (AAA) without rupture, unspecified part (H) 04/10/2024     Acute heart failure with preserved ejection fraction (H) 02/07/2024     Depression      Diabetes mellitus (H)      GERD (gastroesophageal reflux disease)      Hyperlipemia      Hypertension      Intracranial hemorrhage (H) 01/01/2011    may be amyloid angiopathy; has been stable.     Mastoiditis 01/01/2011     Near syncope 07/26/2020     Osteoarthritis      Paroxysmal atrial fibrillation (H) 04/21/2023     TMJ (temporomandibular joint disorder)      Past Surgical History:   Procedure Laterality Date     CYST REMOVAL      tongue     EP ABLATION PULMONARY VEIN ISOLATION N/A 10/14/2024    Procedure: Ablation Atrial Fibrillation;  Surgeon: Gokul Max MD;  Location: [facility] CV     HYSTERECTOMY  01/01/1996     JOINT REPLACEMENT Right 2017     OOPHORECTOMY Bilateral 01/01/1996     Family History   Problem Relation Age of Onset     Heart Disease Father      No Known Problems Mother      No Known Problems Sister      No Known Problems Daughter      No Known Problems Maternal Grandmother      No Known Problems Maternal Grandfather      No Known Problems Paternal Grandmother      No Known Problems Paternal Grandfather      No Known Problems Maternal Aunt      No Known Problems Paternal Aunt      Hereditary Breast and Ovarian Cancer Syndrome No family hx of      Breast Cancer No family hx of      Cancer No family hx of      Colon Cancer No family hx of      Endometrial Cancer No family hx of      Ovarian Cancer No family hx of         Social History     Socioeconomic History     Marital status:      Spouse name: Not on file     Number of children: Not on file     Years of education: Not on file     Highest education level: Not on file   Occupational History     Not on file   Tobacco Use     Smoking status:  Former     Smokeless tobacco: Never   Substance and Sexual Activity     Alcohol use: Not Currently     Alcohol/week: 1.7 standard drinks of alcohol     Types: 2 Standard drinks or equivalent per week     Drug use: No     Sexual activity: Not on file   Other Topics Concern     Not on file   Social History Narrative     Not on file     Social Drivers of Health     Financial Resource Strain: Low Risk  (12/29/2023)    Received from [facility], [facility]    Financial Resource Strain      Difficulty of Paying Living Expenses: 3      Difficulty of Paying Living Expenses: Not on file   Food Insecurity: No Food Insecurity (12/29/2023)    Received from [facility]    Food Insecurity      Do you worry your food will run out before you are able to buy more?: 1   Transportation Needs: No Transportation Needs (12/29/2023)    Received from [facility] [employer] [facility]    Transportation Needs      Does lack of transportation keep you from medical appointments?: 1      Does lack of transportation keep you from work, meetings or getting things that you need?: 1   Physical Activity: Not on file   Stress: Not on file   Social Connections: Socially Integrated (12/29/2023)    Received from [facility]    Social Connections      Do you often feel lonely or isolated from those around you?: 0   Interpersonal Safety: Low Risk  (10/14/2024)    Interpersonal Safety      Do you feel physically and emotionally safe where you currently live?: Yes      Within the past 12 months, have you been hit, slapped, kicked or otherwise physically hurt by someone?: No      Within the past 12 months, have you been humiliated or emotionally abused in other ways by your partner or ex-partner?: No   Housing Stability: Low Risk  (12/29/2023)    [facility]    Housing  Stability      What is your housing situation today?: 1           Medications  Allergies   Current Outpatient Medications   Medication Sig Dispense Refill     acetaminophen (TYLENOL) 500 MG tablet Take 1,000 mg by mouth 2 times daily as needed       alendronate (FOSAMAX) 70 MG tablet Take 70 mg by mouth every 7 days       allopurinol (ZYLOPRIM) 100 MG tablet Take 100 mg by mouth daily       amLODIPine (NORVASC) 5 MG tablet Take 1 tablet by mouth daily at 2 pm       apixaban ANTICOAGULANT (ELIQUIS ANTICOAGULANT) 2.5 MG tablet Take 1 tablet (2.5 mg) by mouth 2 times daily 180 tablet 2     atorvastatin (LIPITOR) 40 MG tablet [ATORVASTATIN (LIPITOR) 40 MG TABLET] Take 40 mg by mouth bedtime.       baclofen (LIORESAL) 10 MG tablet [BACLOFEN (LIORESAL) 10 MG TABLET] Take 10 mg by mouth at bedtime.        calcium carbonate (OS-MARCELO) 600 mg calcium (1,500 mg) tablet [CALCIUM CARBONATE (OS-MARCELO) 600 MG CALCIUM (1,500 MG) TABLET] Take 1 tablet by mouth daily.        celecoxib (CELEBREX) 200 MG capsule [CELECOXIB (CELEBREX) 200 MG CAPSULE] Take 200 mg by mouth daily.       cetirizine (ZYRTEC) 10 MG tablet Take 10 mg by mouth daily       CONTOUR NEXT TEST test strip 2 times daily       cyanocobalamin 1000 MCG tablet [CYANOCOBALAMIN 1000 MCG TABLET] Take 1,000 mcg by mouth daily.       hydrOXYzine (ATARAX) 25 MG tablet TAKE 1 TABLET BY MOUTH UP TO 3 TIMES DAILY AS NEEDED FOR ANXIETY       lactobacillus combo no.11 15 billion cell CpSP Take 1 capsule by mouth daily       lisinopril (ZESTRIL) 20 MG tablet TAKE 1 TABLET BY MOUTH TWICE A DAY FOR BLOOD PRESSURE       LORazepam (ATIVAN) 0.5 MG tablet [LORAZEPAM (ATIVAN) 0.5 MG TABLET] Take 0.5 mg by mouth 2 (two) times a day as needed.        magnesium oxide (MAG-OX) 400 mg tablet [MAGNESIUM OXIDE (MAG-OX) 400 MG TABLET] Take 400 mg by mouth daily.       metFORMIN (GLUCOPHAGE-XR) 500 MG 24 hr tablet [METFORMIN (GLUCOPHAGE-XR) 500 MG 24 HR TABLET] TAKE 1 TABLET BY MOUTH EVERY DAY FOR  DIABETES  1     Microlet Lancets MISC TEST BLOOD SUGAR 2 X'S DAILY DX: E11.9       sertraline (ZOLOFT) 25 MG tablet Take 25 mg by mouth daily       vitamin D3 (CHOLECALCIFEROL) 50 mcg (2000 units) tablet Take 1 tablet by mouth daily         Allergies   Allergen Reactions     Seasonal Allergies         Denies previous adverse reaction to anesthesia      Lab Results    Chemistry/lipid CBC Cardiac Enzymes/BNP/TSH/INR   Recent Labs   Lab Test 05/11/23  1000 05/16/22  0950   CHOL 117 115   HDL 51 44*   LDL 43 50   TRIG 113 106      Recent Labs   Lab Test 10/14/24  0611 10/04/24  0915   * 129*   POTASSIUM 3.5 4.3   CHLORIDE 98 94*   CO2 23 23   ANIONGAP 13 12   * 123*   BUN 8.9 9.5   CR 0.58 0.53   MARCELO 9.9 9.6      CBC RESULTS:   Recent Labs   Lab Test 10/14/24  0611   WBC 10.7   RBC 4.61   HGB 14.3   HCT 42.4   MCV 92   MCH 31.0   MCHC 33.7   RDW 13.3           TSH   Date Value Ref Range Status   04/10/2024 0.65 0.30 - 4.20 uIU/mL Final   12/15/2021 0.64 0.30 - 5.00 uIU/mL Final            The longitudinal plan of care for the diagnosis(es)/condition(s) as documented were addressed during this visit. Due to the added complexity in care, I will continue to support Adilia in the subsequent management and with ongoing continuity of care.                                           Thank you for allowing me to participate in the care of your patient.      Sincerely,     PARVIN Gunn CNP     [facility]  cc:   PARVIN Gunn CNP  [address], [address]  [place] [address]

## 2024-12-08 ENCOUNTER — HEALTH MAINTENANCE LETTER (OUTPATIENT)
Age: 84
End: 2024-12-08

## 2024-12-12 ENCOUNTER — TELEPHONE (OUTPATIENT)
Dept: CARDIOLOGY | Facility: CLINIC | Age: 84
End: 2024-12-12
Payer: COMMERCIAL

## 2024-12-12 NOTE — TELEPHONE ENCOUNTER
I just stopped it ~2 weeks ago, so should be fine. Did they happen to get an EKG first so we know what rhythm she was in?  Thanks,  Tavia

## 2024-12-12 NOTE — TELEPHONE ENCOUNTER
Returned phone call to dtr Kaela. She reports that  pt's HR was elevated so they took pt into see Dr. Nyasia Trujillo from [facility]. This provider ordered Sotalol 80 mg BID, resuming at previous dose.   Let dtr know that when managed by EP that an EKG is required after 5th dose and to reach out to [facility] to discuss this.     Confirmed with family a message would be sent to [place] and if any further recommendations will CB.             Viola Greer RN

## 2024-12-12 NOTE — TELEPHONE ENCOUNTER
Health Call Center    Phone Message    May a detailed message be left on voicemail: yes     Reason for Call: Medication Refill Request    Has the patient contacted the pharmacy for the refill? Yes   Name of medication being requested: Sotalol - same dosage as pt was on 3-4 weeks ago  Provider who prescribed the medication: ?  Pharmacy: [id number] - [place], [place] - [address] [address]   Date medication is needed: pt was started on Sotalol today but not aware of who started her or why.  Pt's daughter wants Tavia Maxwell to know pt has been put on this med again.     Action Taken: Message routed to:  [facility] cardio    Travel Screening: Not Applicable    Thank you!  [facility]       Date of Service:

## 2025-02-13 ENCOUNTER — OFFICE VISIT (OUTPATIENT)
Dept: CARDIOLOGY | Facility: CLINIC | Age: 85
End: 2025-02-13
Attending: NURSE PRACTITIONER
Payer: COMMERCIAL

## 2025-02-13 VITALS
HEART RATE: 86 BPM | DIASTOLIC BLOOD PRESSURE: 62 MMHG | SYSTOLIC BLOOD PRESSURE: 104 MMHG | WEIGHT: 122 LBS | HEIGHT: 60 IN | RESPIRATION RATE: 18 BRPM | BODY MASS INDEX: 23.95 KG/M2

## 2025-02-13 DIAGNOSIS — I48.0 PAROXYSMAL ATRIAL FIBRILLATION (H): Primary | ICD-10-CM

## 2025-02-13 DIAGNOSIS — I10 PRIMARY HYPERTENSION: ICD-10-CM

## 2025-02-13 LAB
ATRIAL RATE - MUSE: 86 BPM
DIASTOLIC BLOOD PRESSURE - MUSE: NORMAL MMHG
INTERPRETATION ECG - MUSE: NORMAL
P AXIS - MUSE: 58 DEGREES
PR INTERVAL - MUSE: 150 MS
QRS DURATION - MUSE: 68 MS
QT - MUSE: 350 MS
QTC - MUSE: 418 MS
R AXIS - MUSE: -30 DEGREES
SYSTOLIC BLOOD PRESSURE - MUSE: NORMAL MMHG
T AXIS - MUSE: 12 DEGREES
VENTRICULAR RATE- MUSE: 86 BPM

## 2025-02-13 RX ORDER — METHOCARBAMOL 500 MG/1
250 TABLET, FILM COATED ORAL 4 TIMES DAILY PRN
COMMUNITY
Start: 2025-02-07

## 2025-02-13 RX ORDER — SOTALOL HYDROCHLORIDE 80 MG/1
40 TABLET ORAL 2 TIMES DAILY
Qty: 90 TABLET | Refills: 3 | Status: SHIPPED | OUTPATIENT
Start: 2025-02-13

## 2025-02-13 RX ORDER — SOTALOL HYDROCHLORIDE 80 MG/1
80 TABLET ORAL 2 TIMES DAILY
COMMUNITY
Start: 2024-12-12 | End: 2025-02-13

## 2025-02-13 RX ORDER — HYDROXYZINE PAMOATE 25 MG/1
25 CAPSULE ORAL EVERY 4 HOURS PRN
COMMUNITY
Start: 2025-01-08

## 2025-02-13 NOTE — PATIENT INSTRUCTIONS
Adilia Eric,    It was a pleasure to see you today at the [facility].     My recommendations after this visit include:    Decrease sotalol to 40 mg (1/2 tab) twice daily    Call if you have recurrence of fast heart rate  Keep a log of episodes (duration, frequency).  Document with Zachariah.    Follow up in 2 months    Tavia Maxwell, CNP  [facility], Electrophysiology  [phone]  EP nurses [phone]

## 2025-02-13 NOTE — PROGRESS NOTES
HEART CARE ELECTROPHYSIOLOGY NOTE      [facility]  [phone]      Assessment/Recommendations   Assessment/Plan:  1.  Paroxysmal Atrial Fibrillation: Sotalol restarted by PCP.  From her description, she likely was having atrial flutter.  She will attempt to send the [employer] strips she took during that time for review.  Discussed recurrence of arrhythmia early after ablation may be due to the ablation itself.  Now that she is further out from ablation, will very slowly wean sotalol to see if she has recurrent arrhythmia.  He will call here if she has recurrence of elevated heart rates.  -- Decrease sotalol to 40 mg twice daily  -- Utilize [employer] twyla for rhythm monitoring     She has a [id number] score of 9 for age >75, female gender, HTN, HF, CAD, DM 2, TIA.  She also has an increased risk for bleed due to gait instability and history of SAH.  HAS-BLED score 3 for age, TIA, bleeding issue.  She may be a candidate for left atrial appendage closure.    -- Continue Eliquis 2.5 mg twice daily for stroke prophylaxis (age >80, weight <60 kg).     2.  Acute heart failure with preserved ejection fraction: Secondary to RVR.  No fluid retention on exam or heart failure symptoms.        3.  Hypertension: Controlled with amlodipine, lisinopril    Follow-up in 2 months     History of Present Illness/Subjective    HPI: Adilia Eric is a 84 year old female who comes in today accompanied by her daughter for EP follow up of atrial fibrillation.  She has a history of paroxysmal atrial fibrillation, hypertension, mild nonobstructive coronary artery disease on angiography in 2013, hyponatremia, type 2 diabetes, remote history of possible TIA, intracranial hemorrhage 2011 without recurrence, abdominal aortic aneurysm (3.3 cm), possible obstructive sleep apnea.    Arrhythmia history  Dx/date: PAF 1/2023.  Recurrent AF-RVR 2/5/2024 associated with acute HF.  Rates did not slow with metoprolol, so she underwent  "urgent cardioversion.  Sx: Palpitations (\"fluttering\"), fatigue, progressive shortness of breath, acute HFpEF  [id number] score: 9 for age >75, female gender, HTN, HF, CAD, DM 2, TIA  HAS-BLED score 3 for age, TIA, bleeding issue.  Oral anticoagulation: Eliquis 2.5 mg twice daily, dose adjusted for age >80 and weight <60 kg  Antiarrhythmic medications, AV caitlyn blocking agents: Sotalol 80 mg twice daily, side effects-SOB (2/10/2024-present)  Procedures  DCCV: 2/5/2024 (ED)  Ablation: 10/14/2024 by Dr. Max (RF PVI)     Adilia states she feels well.  Episodes of fast heart rate 120-180 for 2 weeks after sotalol was discontinued associated with \"fluttering\".  She saw PCP from [facility] who resumed her sotalol 80 mg twice daily.  No EKG was done to confirm her rhythm prior to sotalol reinitiation.  She recorded some strips using her [employer] device but did not bring it with her today.  She states that her rhythm has been back to normal since 2 to 3 days after restarting sotalol.  She has a baseline of dyspnea on exertion.  She denies chest discomfort, palpitations, significant peripheral edema, shortness of breath at rest, paroxysmal nocturnal dyspnea, orthopnea, lightheadedness, dizziness, pre-syncope, or syncope.     Cardiographics (EKG personally reviewed):  EKG done 213 425 shows sinus rhythm at 86 bpm, QRS 60 ms, QT/QTc 350/418 ms  EKG done 10/14/2024 shows sinus tachycardia at 138 bpm, QRS 66 ms, QT/QTc 354/536 ms  EKG done 10/4/2024 shows sinus rhythm at 72 bpm, PACs, QRS 70 ms, QT/QTc 386/422 ms  EKG done 4/10/2024 shows sinus rhythm at 98 bpm, QRS 70 ms, QT/QTc 326/460 ms  EKG done 2/5/2024 shows atrial fibrillation with rapid ventricular response at 118 bpm.  Subsequent EKG shows sinus rhythm at 81 bpm, QRS 66 ms, QT/QTc 348/404 ms  EKG done 1/20/2023 shows atrial fibrillation with rapid ventricular response at 126 bpm     ECHO done 2/17/2023:  Left ventricular size, wall motion and function are normal. " The ejection  fraction is 55-60%.  Normal right ventricle size and systolic function.  Both atria are of normal size.  No hemodynamically significant valvular abnormalities on 2D or color flow  imaging.  There is no comparison study available.     NM stress test done 5/18/2022:    Lexiscan stress ECG negative for ischemia.    The nuclear stress test is negative for inducible myocardial ischemia or infarction.    The left ventricular ejection fraction at stress is greater than 70%.    A prior study was conducted on 4/16/2019.  No interval change.    I have reviewed and updated the patient's Past Medical History, Social History, Family History and Medication List.  Outside records personally reviewed.     Physical Examination  Review of Systems   Vitals: /62 (BP Location: Left arm, Patient Position: Sitting, Cuff Size: Adult Regular)   Pulse 86   Resp 18   Ht 1.524 m (5')   Wt 55.3 kg (122 lb)   BMI 23.83 kg/m    BMI= Body mass index is 23.83 kg/m .  Wt Readings from Last 3 Encounters:   02/13/25 55.3 kg (122 lb)   11/25/24 55.3 kg (122 lb)   10/14/24 54.8 kg (120 lb 12.8 oz)       General Appearance:   Alert, well-appearing and in no acute distress.   HEENT: Atraumatic, normocephalic.  No scleral icterus, normal conjunctivae, EOMs intact, PERRL.  Mucous membranes pink and moist.     Chest/Lungs:   Chest symmetric, spine straight.  Respirations unlabored.  Lungs are clear to auscultation.   Cardiovascular:   Regular rate and rhythm.  Normal first and second heart sounds with no murmurs, rubs, or gallops; radial pulses are intact, No edema.   Abdomen:  Soft, nondistended.   Extremities: No cyanosis or clubbing.   Musculoskeletal: Moves all extremities.     Skin: Warm, dry, intact.    Neurologic: Mood and affect are appropriate.  Alert and oriented to person, place, time, and situation.     ROS: 10 point ROS neg other than the symptoms noted above in the HPI.         Medical History  Surgical History Family  History Social History   Past Medical History:   Diagnosis Date    Abdominal aortic aneurysm (AAA) without rupture, unspecified part 04/10/2024    Acute heart failure with preserved ejection fraction (H) 02/07/2024    Depression     Diabetes mellitus (H)     GERD (gastroesophageal reflux disease)     Hyperlipemia     Hypertension     Intracranial hemorrhage (H) 01/01/2011    may be amyloid angiopathy; has been stable.    Mastoiditis 01/01/2011    Near syncope 07/26/2020    Osteoarthritis     Paroxysmal atrial fibrillation (H) 04/21/2023    TMJ (temporomandibular joint disorder)      Past Surgical History:   Procedure Laterality Date    CYST REMOVAL      tongue    EP ABLATION PULMONARY VEIN ISOLATION N/A 10/14/2024    Procedure: Ablation Atrial Fibrillation;  Surgeon: Gokul Max MD;  Location: [facility] CV    HYSTERECTOMY  01/01/1996    JOINT REPLACEMENT Right 2017    OOPHORECTOMY Bilateral 01/01/1996     Family History   Problem Relation Age of Onset    Heart Disease Father     No Known Problems Mother     No Known Problems Sister     No Known Problems Daughter     No Known Problems Maternal Grandmother     No Known Problems Maternal Grandfather     No Known Problems Paternal Grandmother     No Known Problems Paternal Grandfather     No Known Problems Maternal Aunt     No Known Problems Paternal Aunt     Hereditary Breast and Ovarian Cancer Syndrome No family hx of     Breast Cancer No family hx of     Cancer No family hx of     Colon Cancer No family hx of     Endometrial Cancer No family hx of     Ovarian Cancer No family hx of         Social History     Socioeconomic History    Marital status:      Spouse name: Not on file    Number of children: Not on file    Years of education: Not on file    Highest education level: Not on file   Occupational History    Not on file   Tobacco Use    Smoking status: Former    Smokeless tobacco: Never   Substance and Sexual Activity    Alcohol use: Not  Currently     Alcohol/week: 1.7 standard drinks of alcohol     Types: 2 Standard drinks or equivalent per week    Drug use: No    Sexual activity: Not on file   Other Topics Concern    Not on file   Social History Narrative    Not on file     Social Drivers of Health     Financial Resource Strain: Low Risk  (12/29/2023)    Received from [facility], [facility]    Financial Resource Strain     Difficulty of Paying Living Expenses: 3     Difficulty of Paying Living Expenses: Not on file   Food Insecurity: No Food Insecurity (12/29/2023)    Received from [facility]    Food Insecurity     Do you worry your food will run out before you are able to buy more?: 1   Transportation Needs: No Transportation Needs (12/29/2023)    Received from [facility]    Transportation Needs     Does lack of transportation keep you from medical appointments?: 1     Does lack of transportation keep you from work, meetings or getting things that you need?: 1   Physical Activity: Not on file   Stress: Not on file   Social Connections: Socially Integrated (12/29/2023)    Received from [facility] [employer] [facility]    Social Connections     Do you often feel lonely or isolated from those around you?: 0   Interpersonal Safety: Low Risk  (10/14/2024)    Interpersonal Safety     Do you feel physically and emotionally safe where you currently live?: Yes     Within the past 12 months, have you been hit, slapped, kicked or otherwise physically hurt by someone?: No     Within the past 12 months, have you been humiliated or emotionally abused in other ways by your partner or ex-partner?: No   Housing Stability: Low Risk  (12/29/2023)    Received from [facility]    Housing Stability     What is your housing situation today?: 1           Medications  Allergies   Current  Outpatient Medications   Medication Sig Dispense Refill    acetaminophen (TYLENOL) 500 MG tablet Take 1,000 mg by mouth 2 times daily as needed      alendronate (FOSAMAX) 70 MG tablet Take 70 mg by mouth every 7 days      allopurinol (ZYLOPRIM) 100 MG tablet Take 100 mg by mouth daily      amLODIPine (NORVASC) 5 MG tablet Take 1 tablet by mouth daily at 2 pm      apixaban ANTICOAGULANT (ELIQUIS ANTICOAGULANT) 2.5 MG tablet Take 1 tablet (2.5 mg) by mouth 2 times daily 180 tablet 2    atorvastatin (LIPITOR) 40 MG tablet [ATORVASTATIN (LIPITOR) 40 MG TABLET] Take 40 mg by mouth bedtime.      baclofen (LIORESAL) 10 MG tablet [BACLOFEN (LIORESAL) 10 MG TABLET] Take 10 mg by mouth at bedtime.       calcium carbonate (OS-MARCELO) 600 mg calcium (1,500 mg) tablet [CALCIUM CARBONATE (OS-MARCELO) 600 MG CALCIUM (1,500 MG) TABLET] Take 1 tablet by mouth daily.       celecoxib (CELEBREX) 200 MG capsule [CELECOXIB (CELEBREX) 200 MG CAPSULE] Take 200 mg by mouth daily.      cetirizine (ZYRTEC) 10 MG tablet Take 10 mg by mouth daily      CONTOUR NEXT TEST test strip 2 times daily      cyanocobalamin 1000 MCG tablet [CYANOCOBALAMIN 1000 MCG TABLET] Take 1,000 mcg by mouth daily.      hydrOXYzine (ATARAX) 25 MG tablet TAKE 1 TABLET BY MOUTH UP TO 3 TIMES DAILY AS NEEDED FOR ANXIETY      hydrOXYzine osmel (VISTARIL) 25 MG capsule Take 25 mg by mouth every 4 hours as needed for other (Pain/Muscle Spasms.).      lactobacillus combo no.11 15 billion cell CpSP Take 1 capsule by mouth daily      lisinopril (ZESTRIL) 20 MG tablet TAKE 1 TABLET BY MOUTH TWICE A DAY FOR BLOOD PRESSURE      LORazepam (ATIVAN) 0.5 MG tablet [LORAZEPAM (ATIVAN) 0.5 MG TABLET] Take 0.5 mg by mouth 2 (two) times a day as needed.       magnesium oxide (MAG-OX) 400 mg tablet [MAGNESIUM OXIDE (MAG-OX) 400 MG TABLET] Take 400 mg by mouth daily.      methocarbamol (ROBAXIN) 500 MG tablet Take 250 mg by mouth 4 times daily as needed for muscle spasms.      Microlet Lancets MISC  TEST BLOOD SUGAR 2 X'S DAILY DX: E11.9      sertraline (ZOLOFT) 25 MG tablet Take 25 mg by mouth daily      sotalol (BETAPACE) 80 MG tablet Take 0.5 tablets (40 mg) by mouth 2 times daily. 90 tablet 3    vitamin D3 (CHOLECALCIFEROL) 50 mcg (2000 units) tablet Take 1 tablet by mouth daily         Allergies   Allergen Reactions    Seasonal Allergies         Denies previous adverse reaction to anesthesia      Lab Results    Chemistry/lipid CBC Cardiac Enzymes/BNP/TSH/INR   Recent Labs   Lab Test 05/11/23  1000 05/16/22  0950   CHOL 117 115   HDL 51 44*   LDL 43 50   TRIG 113 106      Recent Labs   Lab Test 10/14/24  0611 10/04/24  0915   * 129*   POTASSIUM 3.5 4.3   CHLORIDE 98 94*   CO2 23 23   ANIONGAP 13 12   * 123*   BUN 8.9 9.5   CR 0.58 0.53   MARCELO 9.9 9.6      CBC RESULTS:   Recent Labs   Lab Test 10/14/24  0611   WBC 10.7   RBC 4.61   HGB 14.3   HCT 42.4   MCV 92   MCH 31.0   MCHC 33.7   RDW 13.3           TSH   Date Value Ref Range Status   04/10/2024 0.65 0.30 - 4.20 uIU/mL Final   12/15/2021 0.64 0.30 - 5.00 uIU/mL Final            The longitudinal plan of care for the diagnosis(es)/condition(s) as documented were addressed during this visit. Due to the added complexity in care, I will continue to support Adilia in the subsequent management and with ongoing continuity of care.

## 2025-02-13 NOTE — LETTER
2/13/2025    PARVIN HERNANDEZ CNP  [facility] [address]  [facility] [address]    RE: Adilia Eric       Dear Colleague,     I had the pleasure of seeing Adilia Eric in the [facility].    HEART CARE ELECTROPHYSIOLOGY NOTE      [facility]  [phone]      Assessment/Recommendations   Assessment/Plan:  1.  Paroxysmal Atrial Fibrillation: Sotalol restarted by PCP.  From her description, she likely was having atrial flutter.  She will attempt to send the Kardia strips she took during that time for review.  Discussed recurrence of arrhythmia early after ablation may be due to the ablation itself.  Now that she is further out from ablation, will very slowly wean sotalol to see if she has recurrent arrhythmia.  He will call here if she has recurrence of elevated heart rates.  -- Decrease sotalol to 40 mg twice daily  -- Utilize thePlatform twyla for rhythm monitoring     She has a [id number] score of 9 for age >75, female gender, HTN, HF, CAD, DM 2, TIA.  She also has an increased risk for bleed due to gait instability and history of SAH.  HAS-BLED score 3 for age, TIA, bleeding issue.  She may be a candidate for left atrial appendage closure.    -- Continue Eliquis 2.5 mg twice daily for stroke prophylaxis (age >80, weight <60 kg).     2.  Acute heart failure with preserved ejection fraction: Secondary to RVR.  No fluid retention on exam or heart failure symptoms.        3.  Hypertension: Controlled with amlodipine, lisinopril    Follow-up in 2 months     History of Present Illness/Subjective    HPI: Adilia Eric is a 84 year old female who comes in today accompanied by her daughter for EP follow up of atrial fibrillation.  She has a history of paroxysmal atrial fibrillation, hypertension, mild nonobstructive coronary artery disease on angiography in 2013, hyponatremia, type 2 diabetes, remote history of possible TIA, intracranial hemorrhage 2011  "without recurrence, abdominal aortic aneurysm (3.3 cm), possible obstructive sleep apnea.    Arrhythmia history  Dx/date: PAF 1/2023.  Recurrent AF-RVR 2/5/2024 associated with acute HF.  Rates did not slow with metoprolol, so she underwent urgent cardioversion.  Sx: Palpitations (\"fluttering\"), fatigue, progressive shortness of breath, acute HFpEF  [id number] score: 9 for age >75, female gender, HTN, HF, CAD, DM 2, TIA  HAS-BLED score 3 for age, TIA, bleeding issue.  Oral anticoagulation: Eliquis 2.5 mg twice daily, dose adjusted for age >80 and weight <60 kg  Antiarrhythmic medications, AV caitlyn blocking agents: Sotalol 80 mg twice daily, side effects-SOB (2/10/2024-present)  Procedures  DCCV: 2/5/2024 (ED)  Ablation: 10/14/2024 by Dr. Max (RF PVI)     Adilia states she feels well.  Episodes of fast heart rate 120-180 for 2 weeks after sotalol was discontinued associated with \"fluttering\".  She saw PCP from [facility] who resumed her sotalol 80 mg twice daily.  No EKG was done to confirm her rhythm prior to sotalol reinitiation.  She recorded some strips using her Neolineara device but did not bring it with her today.  She states that her rhythm has been back to normal since 2 to 3 days after restarting sotalol.  She has a baseline of dyspnea on exertion.  She denies chest discomfort, palpitations, significant peripheral edema, shortness of breath at rest, paroxysmal nocturnal dyspnea, orthopnea, lightheadedness, dizziness, pre-syncope, or syncope.     Cardiographics (EKG personally reviewed):  EKG done 213 425 shows sinus rhythm at 86 bpm, QRS 60 ms, QT/QTc 350/418 ms  EKG done 10/14/2024 shows sinus tachycardia at 138 bpm, QRS 66 ms, QT/QTc 354/536 ms  EKG done 10/4/2024 shows sinus rhythm at 72 bpm, PACs, QRS 70 ms, QT/QTc 386/422 ms  EKG done 4/10/2024 shows sinus rhythm at 98 bpm, QRS 70 ms, QT/QTc 326/460 ms  EKG done 2/5/2024 shows atrial fibrillation with rapid ventricular response at 118 bpm.  " Subsequent EKG shows sinus rhythm at 81 bpm, QRS 66 ms, QT/QTc 348/404 ms  EKG done 1/20/2023 shows atrial fibrillation with rapid ventricular response at 126 bpm     ECHO done 2/17/2023:  Left ventricular size, wall motion and function are normal. The ejection  fraction is 55-60%.  Normal right ventricle size and systolic function.  Both atria are of normal size.  No hemodynamically significant valvular abnormalities on 2D or color flow  imaging.  There is no comparison study available.     NM stress test done 5/18/2022:     Lexiscan stress ECG negative for ischemia.     The nuclear stress test is negative for inducible myocardial ischemia or infarction.     The left ventricular ejection fraction at stress is greater than 70%.     A prior study was conducted on 4/16/2019.  No interval change.    I have reviewed and updated the patient's Past Medical History, Social History, Family History and Medication List.  Outside records personally reviewed.     Physical Examination  Review of Systems   Vitals: /62 (BP Location: Left arm, Patient Position: Sitting, Cuff Size: Adult Regular)   Pulse 86   Resp 18   Ht 1.524 m (5')   Wt 55.3 kg (122 lb)   BMI 23.83 kg/m    BMI= Body mass index is 23.83 kg/m .  Wt Readings from Last 3 Encounters:   02/13/25 55.3 kg (122 lb)   11/25/24 55.3 kg (122 lb)   10/14/24 54.8 kg (120 lb 12.8 oz)       General Appearance:   Alert, well-appearing and in no acute distress.   HEENT: Atraumatic, normocephalic.  No scleral icterus, normal conjunctivae, EOMs intact, PERRL.  Mucous membranes pink and moist.     Chest/Lungs:   Chest symmetric, spine straight.  Respirations unlabored.  Lungs are clear to auscultation.   Cardiovascular:   Regular rate and rhythm.  Normal first and second heart sounds with no murmurs, rubs, or gallops; radial pulses are intact, No edema.   Abdomen:  Soft, nondistended.   Extremities: No cyanosis or clubbing.   Musculoskeletal: Moves all extremities.      Skin: Warm, dry, intact.    Neurologic: Mood and affect are appropriate.  Alert and oriented to person, place, time, and situation.     ROS: 10 point ROS neg other than the symptoms noted above in the HPI.         Medical History  Surgical History Family History Social History   Past Medical History:   Diagnosis Date     Abdominal aortic aneurysm (AAA) without rupture, unspecified part 04/10/2024     Acute heart failure with preserved ejection fraction (H) 02/07/2024     Depression      Diabetes mellitus (H)      GERD (gastroesophageal reflux disease)      Hyperlipemia      Hypertension      Intracranial hemorrhage (H) 01/01/2011    may be amyloid angiopathy; has been stable.     Mastoiditis 01/01/2011     Near syncope 07/26/2020     Osteoarthritis      Paroxysmal atrial fibrillation (H) 04/21/2023     TMJ (temporomandibular joint disorder)      Past Surgical History:   Procedure Laterality Date     CYST REMOVAL      tongue     EP ABLATION PULMONARY VEIN ISOLATION N/A 10/14/2024    Procedure: Ablation Atrial Fibrillation;  Surgeon: Gokul Max MD;  [facility] CV     HYSTERECTOMY  01/01/1996     JOINT REPLACEMENT Right 2017     OOPHORECTOMY Bilateral 01/01/1996     Family History   Problem Relation Age of Onset     Heart Disease Father      No Known Problems Mother      No Known Problems Sister      No Known Problems Daughter      No Known Problems Maternal Grandmother      No Known Problems Maternal Grandfather      No Known Problems Paternal Grandmother      No Known Problems Paternal Grandfather      No Known Problems Maternal Aunt      No Known Problems Paternal Aunt      Hereditary Breast and Ovarian Cancer Syndrome No family hx of      Breast Cancer No family hx of      Cancer No family hx of      Colon Cancer No family hx of      Endometrial Cancer No family hx of      Ovarian Cancer No family hx of         Social History     Socioeconomic History     Marital status:       Spouse name: Not on file     Number of children: Not on file     Years of education: Not on file     Highest education level: Not on file   Occupational History     Not on file   Tobacco Use     Smoking status: Former     Smokeless tobacco: Never   Substance and Sexual Activity     Alcohol use: Not Currently     Alcohol/week: 1.7 standard drinks of alcohol     Types: 2 Standard drinks or equivalent per week     Drug use: No     Sexual activity: Not on file   Other Topics Concern     Not on file   Social History Narrative     Not on file     Social Drivers of Health     Financial Resource Strain: Low Risk  (12/29/2023)    [facility]    Financial Resource Strain      Difficulty of Paying Living Expenses: 3      Difficulty of Paying Living Expenses: Not on file   Food Insecurity: No Food Insecurity (12/29/2023)    [facility]    Food Insecurity      Do you worry your food will run out before you are able to buy more?: 1   Transportation Needs: No Transportation Needs (12/29/2023)    [facility]    Transportation Needs      Does lack of transportation keep you from medical appointments?: 1      Does lack of transportation keep you from work, meetings or getting things that you need?: 1   Physical Activity: Not on file   Stress: Not on file   Social Connections: Socially Integrated (12/29/2023)    [facility]    Social Connections      Do you often feel lonely or isolated from those around you?: 0   Interpersonal Safety: Low Risk  (10/14/2024)    Interpersonal Safety      Do you feel physically and emotionally safe where you currently live?: Yes      Within the past 12 months, have you been hit, slapped, kicked or otherwise physically hurt by someone?: No      Within the past 12 months,  have you been humiliated or emotionally abused in other ways by your partner or ex-partner?: No   Housing Stability: Low Risk  (12/29/2023)    Received from [facility]    Housing Stability      What is your housing situation today?: 1           Medications  Allergies   Current Outpatient Medications   Medication Sig Dispense Refill     acetaminophen (TYLENOL) 500 MG tablet Take 1,000 mg by mouth 2 times daily as needed       alendronate (FOSAMAX) 70 MG tablet Take 70 mg by mouth every 7 days       allopurinol (ZYLOPRIM) 100 MG tablet Take 100 mg by mouth daily       amLODIPine (NORVASC) 5 MG tablet Take 1 tablet by mouth daily at 2 pm       apixaban ANTICOAGULANT (ELIQUIS ANTICOAGULANT) 2.5 MG tablet Take 1 tablet (2.5 mg) by mouth 2 times daily 180 tablet 2     atorvastatin (LIPITOR) 40 MG tablet [ATORVASTATIN (LIPITOR) 40 MG TABLET] Take 40 mg by mouth bedtime.       baclofen (LIORESAL) 10 MG tablet [BACLOFEN (LIORESAL) 10 MG TABLET] Take 10 mg by mouth at bedtime.        calcium carbonate (OS-MARCELO) 600 mg calcium (1,500 mg) tablet [CALCIUM CARBONATE (OS-MARCELO) 600 MG CALCIUM (1,500 MG) TABLET] Take 1 tablet by mouth daily.        celecoxib (CELEBREX) 200 MG capsule [CELECOXIB (CELEBREX) 200 MG CAPSULE] Take 200 mg by mouth daily.       cetirizine (ZYRTEC) 10 MG tablet Take 10 mg by mouth daily       CONTOUR NEXT TEST test strip 2 times daily       cyanocobalamin 1000 MCG tablet [CYANOCOBALAMIN 1000 MCG TABLET] Take 1,000 mcg by mouth daily.       hydrOXYzine (ATARAX) 25 MG tablet TAKE 1 TABLET BY MOUTH UP TO 3 TIMES DAILY AS NEEDED FOR ANXIETY       hydrOXYzine osmel (VISTARIL) 25 MG capsule Take 25 mg by mouth every 4 hours as needed for other (Pain/Muscle Spasms.).       lactobacillus combo no.11 15 billion cell CpSP Take 1 capsule by mouth daily       lisinopril (ZESTRIL) 20 MG tablet TAKE 1 TABLET BY MOUTH TWICE A DAY FOR BLOOD PRESSURE       LORazepam (ATIVAN) 0.5 MG tablet  [LORAZEPAM (ATIVAN) 0.5 MG TABLET] Take 0.5 mg by mouth 2 (two) times a day as needed.        magnesium oxide (MAG-OX) 400 mg tablet [MAGNESIUM OXIDE (MAG-OX) 400 MG TABLET] Take 400 mg by mouth daily.       methocarbamol (ROBAXIN) 500 MG tablet Take 250 mg by mouth 4 times daily as needed for muscle spasms.       Microlet Lancets MISC TEST BLOOD SUGAR 2 X'S DAILY DX: E11.9       sertraline (ZOLOFT) 25 MG tablet Take 25 mg by mouth daily       sotalol (BETAPACE) 80 MG tablet Take 0.5 tablets (40 mg) by mouth 2 times daily. 90 tablet 3     vitamin D3 (CHOLECALCIFEROL) 50 mcg (2000 units) tablet Take 1 tablet by mouth daily         Allergies   Allergen Reactions     Seasonal Allergies         Denies previous adverse reaction to anesthesia      Lab Results    Chemistry/lipid CBC Cardiac Enzymes/BNP/TSH/INR   Recent Labs   Lab Test 05/11/23  1000 05/16/22  0950   CHOL 117 115   HDL 51 44*   LDL 43 50   TRIG 113 106      Recent Labs   Lab Test 10/14/24  0611 10/04/24  0915   * 129*   POTASSIUM 3.5 4.3   CHLORIDE 98 94*   CO2 23 23   ANIONGAP 13 12   * 123*   BUN 8.9 9.5   CR 0.58 0.53   MARCELO 9.9 9.6      CBC RESULTS:   Recent Labs   Lab Test 10/14/24  0611   WBC 10.7   RBC 4.61   HGB 14.3   HCT 42.4   MCV 92   MCH 31.0   MCHC 33.7   RDW 13.3           TSH   Date Value Ref Range Status   04/10/2024 0.65 0.30 - 4.20 uIU/mL Final   12/15/2021 0.64 0.30 - 5.00 uIU/mL Final            The longitudinal plan of care for the diagnosis(es)/condition(s) as documented were addressed during this visit. Due to the added complexity in care, I will continue to support Adilia in the subsequent management and with ongoing continuity of care.                                           Thank you for allowing me to participate in the care of your patient.      Sincerely,     PARVIN Gunn CNP     [facility]  cc:   PARVIN Gunn CNP  [address],  [address]  [place] [address]

## 2025-02-16 ENCOUNTER — HEALTH MAINTENANCE LETTER (OUTPATIENT)
Age: 85
End: 2025-02-16

## 2025-03-16 ENCOUNTER — HEALTH MAINTENANCE LETTER (OUTPATIENT)
Age: 85
End: 2025-03-16

## 2025-04-16 ENCOUNTER — OFFICE VISIT (OUTPATIENT)
Dept: CARDIOLOGY | Facility: CLINIC | Age: 85
End: 2025-04-16
Payer: COMMERCIAL

## 2025-04-16 VITALS
OXYGEN SATURATION: 94 % | SYSTOLIC BLOOD PRESSURE: 110 MMHG | HEART RATE: 91 BPM | DIASTOLIC BLOOD PRESSURE: 70 MMHG | BODY MASS INDEX: 24.39 KG/M2 | WEIGHT: 124.2 LBS | RESPIRATION RATE: 14 BRPM | HEIGHT: 60 IN

## 2025-04-16 DIAGNOSIS — Z98.890 S/P ABLATION OF ATRIAL FIBRILLATION: ICD-10-CM

## 2025-04-16 DIAGNOSIS — I10 PRIMARY HYPERTENSION: ICD-10-CM

## 2025-04-16 DIAGNOSIS — Z86.79 S/P ABLATION OF ATRIAL FIBRILLATION: ICD-10-CM

## 2025-04-16 DIAGNOSIS — I48.0 PAROXYSMAL ATRIAL FIBRILLATION (H): Primary | ICD-10-CM

## 2025-04-16 DIAGNOSIS — R00.0 SINUS TACHYCARDIA: ICD-10-CM

## 2025-04-16 RX ORDER — DULOXETIN HYDROCHLORIDE 20 MG/1
20 CAPSULE, DELAYED RELEASE ORAL DAILY
COMMUNITY

## 2025-04-16 RX ORDER — NYSTATIN 100000 [USP'U]/ML
500000 SUSPENSION ORAL 4 TIMES DAILY
COMMUNITY

## 2025-04-16 RX ORDER — DOXYCYCLINE HYCLATE 100 MG/1
100 TABLET, DELAYED RELEASE ORAL 2 TIMES DAILY
COMMUNITY

## 2025-04-16 RX ORDER — CHOLECALCIFEROL (VITAMIN D3) 50 MCG
1 TABLET ORAL DAILY
COMMUNITY

## 2025-04-16 RX ORDER — METOPROLOL SUCCINATE 25 MG/1
25 TABLET, EXTENDED RELEASE ORAL DAILY
Qty: 90 TABLET | Refills: 3 | Status: SHIPPED | OUTPATIENT
Start: 2025-04-16

## 2025-04-16 NOTE — PROGRESS NOTES
HEART CARE ELECTROPHYSIOLOGY NOTE      [facility]  [phone]      Assessment/Recommendations   Assessment/Plan:  1.  Paroxysmal Atrial Fibrillation: No evidence of A-fib recurrence.  Documentation of sinus rhythm and mild sinus tachycardia, elevation of rates likely due to pain with recent surgery and shingles.  Discussed treatment of sinus tachycardia is to treat underlying cause.  Will support with beta-blocker.  As she has had no evidence of true arrhythmia post ablation, would like to get her off antiarrhythmic medications at this time.  -- Discontinue sotalol  -- Start metoprolol XL 25 mg daily, uptitrate as needed to keep resting heart rates under 100  -- Utilize Grid[employer]ure twyla for rhythm monitoring     She has a PCO2HA0-PVVc score of 9 for age >75, female gender, HTN, HF, CAD, DM 2, TIA.  She also has an increased risk for bleed due to gait instability and history of SAH.  HAS-BLED score 3 for age, TIA, bleeding issue.  She may be a candidate for left atrial appendage closure.    -- Continue Eliquis 2.5 mg twice daily for stroke prophylaxis (age >80, weight <60 kg).     2.  Acute heart failure with preserved ejection fraction: Secondary to RVR.  No fluid retention on exam or heart failure symptoms.        3.  Hypertension: Controlled    Follow-up in October, 1 year post ablation     History of Present Illness/Subjective    HPI: Adilia Eric is a 84 year old female who comes in today accompanied by her daughter for EP follow up of atrial fibrillation.  She has a history of paroxysmal atrial fibrillation, hypertension, mild nonobstructive coronary artery disease on angiography in 2013, hyponatremia, type 2 diabetes, remote history of possible TIA, intracranial hemorrhage 2011 without recurrence, abdominal aortic aneurysm (3.3 cm), possible obstructive sleep apnea, left occipital neuralgia s/p decompression 3/14/2025.  She has had near complete resolution of her occipital nerve pain.  She  "has broken out with a rash along the dermatomal distribution due to shingles.  Neurosurgery notes from yesterday reviewed.    Arrhythmia history  Dx/date: PAF 1/2023.  Recurrent AF-RVR 2/5/2024 associated with acute HF.  Rates did not slow with metoprolol, so she underwent urgent cardioversion.  Sx: Palpitations (\"fluttering\"), fatigue, progressive shortness of breath, acute HFpEF  [id number] score: 9 for age >75, female gender, HTN, HF, CAD, DM 2, TIA  HAS-BLED score 3 for age, TIA, bleeding issue.  Oral anticoagulation: Eliquis 2.5 mg twice daily, dose adjusted for age >80 and weight <60 kg  Antiarrhythmic medications, AV caitlyn blocking agents: Sotalol 80 mg twice daily, side effects-SOB (2/10/2024-present)  Procedures  DCCV: 2/5/2024 (ED)  Ablation: 10/14/2024 by Dr. Max (RF PVI)     Adilia states that she is feeling a bit better, but heart rates have been elevated.  She has been documenting them with her Kardia device.  Blood pressures have been 130s/80s.  She denies chest discomfort, palpitations, significant peripheral edema, shortness of breath at rest, paroxysmal nocturnal dyspnea, orthopnea, lightheadedness, dizziness, pre-syncope, or syncope.     Cardiographics (EKGs, Kardia strips personally reviewed):  EKG done 4/16/2025 shows sinus rhythm at 91 bpm, QRS 70 ms, QT/QTc 354/435 ms  3/14/2015 rhythm strip showed sinus rhythm at 95 bpm  EKG done 2/13/2025 shows sinus rhythm at 86 bpm, QRS 60 ms, QT/QTc 350/418 ms  EKG done 10/14/2024 shows sinus tachycardia at 138 bpm, QRS 66 ms, QT/QTc 354/536 ms  EKG done 10/4/2024 shows sinus rhythm at 72 bpm, PACs, QRS 70 ms, QT/QTc 386/422 ms  EKG done 4/10/2024 shows sinus rhythm at 98 bpm, QRS 70 ms, QT/QTc 326/460 ms  EKG done 2/5/2024 shows atrial fibrillation with rapid ventricular response at 118 bpm.  Subsequent EKG shows sinus rhythm at 81 bpm, QRS 66 ms, QT/QTc 348/404 ms  EKG done 1/20/2023 shows atrial fibrillation with rapid ventricular response at " 126 bpm     Kardia ECG strips show sinus tachycardia [id number], no A-fib    ECHO done 2/17/2023:  Left ventricular size, wall motion and function are normal. The ejection  fraction is 55-60%.  Normal right ventricle size and systolic function.  Both atria are of normal size.  No hemodynamically significant valvular abnormalities on 2D or color flow  imaging.  There is no comparison study available.     NM stress test done 5/18/2022:    Lexiscan stress ECG negative for ischemia.    The nuclear stress test is negative for inducible myocardial ischemia or infarction.    The left ventricular ejection fraction at stress is greater than 70%.    A prior study was conducted on 4/16/2019.  No interval change.    I have reviewed and updated the patient's Past Medical History, Social History, Family History and Medication List.  Outside records personally reviewed.     Physical Examination  Review of Systems   Vitals: /70 (BP Location: Left arm)   Pulse 91   Resp 14   Ht 1.524 m (5')   Wt 56.3 kg (124 lb 3.2 oz)   SpO2 94%   BMI 24.26 kg/m    BMI= Body mass index is 24.26 kg/m .  Wt Readings from Last 3 Encounters:   04/16/25 56.3 kg (124 lb 3.2 oz)   02/13/25 55.3 kg (122 lb)   11/25/24 55.3 kg (122 lb)       General Appearance:   Alert, well-appearing and in no acute distress.   HEENT: Atraumatic, normocephalic.  No scleral icterus, normal conjunctivae, EOMs intact, PERRL.  Mucous membranes pink and moist.     Chest/Lungs:   Chest symmetric, spine straight.  Respirations unlabored.  Lungs are clear to auscultation.   Cardiovascular:   Regular rate and rhythm.  Normal first and second heart sounds with no murmurs, rubs, or gallops; radial pulses are intact, No edema.   Abdomen:  Soft, nondistended.   Extremities: No cyanosis or clubbing.   Musculoskeletal: Moves all extremities.     Skin: Warm, dry, intact.    Neurologic: Mood and affect are appropriate.  Alert and oriented to person, place, time, and  situation.     ROS: 10 point ROS neg other than the symptoms noted above in the HPI.         Medical History  Surgical History Family History Social History   Past Medical History:   Diagnosis Date    Abdominal aortic aneurysm (AAA) without rupture, unspecified part 04/10/2024    Acute heart failure with preserved ejection fraction (H) 02/07/2024    Depression     Diabetes mellitus (H)     GERD (gastroesophageal reflux disease)     Hyperlipemia     Hypertension     Intracranial hemorrhage (H) 01/01/2011    may be amyloid angiopathy; has been stable.    Mastoiditis 01/01/2011    Near syncope 07/26/2020    Osteoarthritis     Paroxysmal atrial fibrillation (H) 04/21/2023    TMJ (temporomandibular joint disorder)      Past Surgical History:   Procedure Laterality Date    CYST REMOVAL      tongue    EP ABLATION PULMONARY VEIN ISOLATION N/A 10/14/2024    Procedure: Ablation Atrial Fibrillation;  Surgeon: Gokul Max MD;  Location: [facility]    HYSTERECTOMY  01/01/1996    JOINT REPLACEMENT Right 2017    OOPHORECTOMY Bilateral 01/01/1996     Family History   Problem Relation Age of Onset    Heart Disease Father     No Known Problems Mother     No Known Problems Sister     No Known Problems Daughter     No Known Problems Maternal Grandmother     No Known Problems Maternal Grandfather     No Known Problems Paternal Grandmother     No Known Problems Paternal Grandfather     No Known Problems Maternal Aunt     No Known Problems Paternal Aunt     Hereditary Breast and Ovarian Cancer Syndrome No family hx of     Breast Cancer No family hx of     Cancer No family hx of     Colon Cancer No family hx of     Endometrial Cancer No family hx of     Ovarian Cancer No family hx of         Social History     Socioeconomic History    Marital status:      Spouse name: Not on file    Number of children: Not on file    Years of education: Not on file    Highest education level: Not on file   Occupational History     Not on file   Tobacco Use    Smoking status: Former    Smokeless tobacco: Never   Substance and Sexual Activity    Alcohol use: Not Currently     Alcohol/week: 1.7 standard drinks of alcohol     Types: 2 Standard drinks or equivalent per week    Drug use: No    Sexual activity: Not on file   Other Topics Concern    Not on file   Social History Narrative    Not on file     Social Drivers of Health     Financial Resource Strain: Low Risk  (12/29/2023)    Received from [facility] [employer] [facility] [employer] [facility], [facility] [employer] [facility] [employer] [facility]    Financial Resource Strain     Difficulty of Paying Living Expenses: 3     Difficulty of Paying Living Expenses: Not on file   Food Insecurity: No Food Insecurity (12/29/2023)    [email or web address]    Food Insecurity     Do you worry your food will run out before you are able to buy more?: 1   Transportation Needs: No Transportation Needs (12/29/2023)    Received from [facility] [employer] [facility]    Transportation Needs     Does lack of transportation keep you from medical appointments?: 1     Does lack of transportation keep you from work, meetings or getting things that you need?: 1   Physical Activity: Not on file   Stress: Not on file   Social Connections: Socially Integrated (12/29/2023)    Received from [facility] [employer] [facility]    Social Connections     Do you often feel lonely or isolated from those around you?: 0   Interpersonal Safety: Low Risk  (10/14/2024)    Interpersonal Safety     Do you feel physically and emotionally safe where you currently live?: Yes     Within the past 12 months, have you been hit, slapped, kicked or otherwise physically hurt by someone?: No     Within the past 12 months, have you been humiliated or emotionally abused in other ways by your partner or ex-partner?: No   Housing Stability: Low Risk  (12/29/2023)    Received from [facility] [employer]  [facility]    Housing Stability     What is your housing situation today?: 1           Medications  Allergies   Current Outpatient Medications   Medication Sig Dispense Refill    acetaminophen (TYLENOL) 500 MG tablet Take 1,000 mg by mouth 2 times daily as needed      alendronate (FOSAMAX) 70 MG tablet Take 70 mg by mouth every 7 days      allopurinol (ZYLOPRIM) 100 MG tablet Take 100 mg by mouth daily      amLODIPine (NORVASC) 5 MG tablet Take 10 mg by mouth daily at 2 pm.      apixaban ANTICOAGULANT (ELIQUIS ANTICOAGULANT) 2.5 MG tablet Take 1 tablet (2.5 mg) by mouth 2 times daily 180 tablet 2    atorvastatin (LIPITOR) 40 MG tablet [ATORVASTATIN (LIPITOR) 40 MG TABLET] Take 40 mg by mouth bedtime.      baclofen (LIORESAL) 10 MG tablet [BACLOFEN (LIORESAL) 10 MG TABLET] Take 10 mg by mouth at bedtime.       calcium carbonate (OS-MARCELO) 600 mg calcium (1,500 mg) tablet [CALCIUM CARBONATE (OS-MARCELO) 600 MG CALCIUM (1,500 MG) TABLET] Take 1 tablet by mouth daily.       celecoxib (CELEBREX) 200 MG capsule [CELECOXIB (CELEBREX) 200 MG CAPSULE] Take 200 mg by mouth daily.      cetirizine (ZYRTEC) 10 MG tablet Take 10 mg by mouth as needed.      CONTOUR NEXT TEST test strip 2 times daily      cyanocobalamin 1000 MCG tablet [CYANOCOBALAMIN 1000 MCG TABLET] Take 1,000 mcg by mouth daily.      Doxycycline Hyclate 100 MG TBEC Take 100 mg by mouth 2 times daily.      DULoxetine (CYMBALTA) 20 MG capsule Take 20 mg by mouth daily.      hydrOXYzine (ATARAX) 25 MG tablet TAKE 1 TABLET BY MOUTH UP TO 3 TIMES DAILY AS NEEDED FOR ANXIETY      hydrOXYzine osmel (VISTARIL) 25 MG capsule Take 25 mg by mouth every 4 hours as needed for other (Pain/Muscle Spasms.).      lactobacillus combo no.11 15 billion cell CpSP Take 1 capsule by mouth daily      lisinopril (ZESTRIL) 20 MG tablet TAKE 1 TABLET BY MOUTH TWICE A DAY FOR BLOOD PRESSURE      LORazepam (ATIVAN) 0.5 MG tablet Take 0.25 mg by mouth daily.      magnesium oxide (MAG-OX)  400 mg tablet [MAGNESIUM OXIDE (MAG-OX) 400 MG TABLET] Take 400 mg by mouth daily.      metoprolol succinate ER (TOPROL XL) 25 MG 24 hr tablet Take 1 tablet (25 mg) by mouth daily. 90 tablet 3    Microlet Lancets MISC TEST BLOOD SUGAR 2 X'S DAILY DX: E11.9      nystatin (MYCOSTATIN) 127154 UNIT/ML suspension Take 500,000 Units by mouth 4 times daily.      vitamin D3 (CHOLECALCIFEROL) 50 mcg (2000 units) tablet Take 1 tablet by mouth daily.      vitamin D3 (CHOLECALCIFEROL) 50 mcg (2000 units) tablet Take 1 tablet by mouth daily      methocarbamol (ROBAXIN) 500 MG tablet Take 250 mg by mouth 4 times daily as needed for muscle spasms. (Patient not taking: Reported on 4/16/2025)         Allergies   Allergen Reactions    Seasonal Allergies         Denies previous adverse reaction to anesthesia      Lab Results    Chemistry/lipid CBC Cardiac Enzymes/BNP/TSH/INR   Recent Labs   Lab Test 05/11/23  1000 05/16/22  0950   CHOL 117 115   HDL 51 44*   LDL 43 50   TRIG 113 106      Recent Labs   Lab Test 10/14/24  0611 10/04/24  0915   * 129*   POTASSIUM 3.5 4.3   CHLORIDE 98 94*   CO2 23 23   ANIONGAP 13 12   * 123*   BUN 8.9 9.5   CR 0.58 0.53   MARCELO 9.9 9.6      CBC RESULTS:   Recent Labs   Lab Test 10/14/24  0611   WBC 10.7   RBC 4.61   HGB 14.3   HCT 42.4   MCV 92   MCH 31.0   MCHC 33.7   RDW 13.3           TSH   Date Value Ref Range Status   04/10/2024 0.65 0.30 - 4.20 uIU/mL Final   12/15/2021 0.64 0.30 - 5.00 uIU/mL Final          53 minutes were spent on the date of encounter performing chart review, history and exam, documentation, and further activities as noted above.    The longitudinal plan of care for the diagnosis(es)/condition(s) as documented were addressed during this visit. Due to the added complexity in care, I will continue to support Adilia in the subsequent management and with ongoing continuity of care.

## 2025-04-16 NOTE — PATIENT INSTRUCTIONS
Adilia Eric,    It was a pleasure to see you today at the [facility].     My recommendations after this visit include:    Stop taking sotalol  Start metoprolol XL 25 mg at night    Check blood pressure every other day for the next 2 weeks to monitor above med changes.      Use Kardia to check rhythm.  Call if you are having recurrent A fib or resting heart rates are consistent > 100    Next routine post ablation follow up in October    Tavia Maxwell CNP  [facility], Electrophysiology  [phone]  EP nurses [phone]

## 2025-04-16 NOTE — LETTER
4/16/2025    PARVIN HERNANDEZ [facility] [address]  [facility] [address]    RE: Adilia MARK Jeaneth       Dear Colleague,     I had the pleasure of seeing Adilia Eric in the [facility].    HEART CARE ELECTROPHYSIOLOGY NOTE      [facility]  [phone]      Assessment/Recommendations   Assessment/Plan:  1.  Paroxysmal Atrial Fibrillation: No evidence of A-fib recurrence.  Documentation of sinus rhythm and mild sinus tachycardia, elevation of rates likely due to pain with recent surgery and shingles.  Discussed treatment of sinus tachycardia is to treat underlying cause.  Will support with beta-blocker.  As she has had no evidence of true arrhythmia post ablation, would like to get her off antiarrhythmic medications at this time.  -- Discontinue sotalol  -- Start metoprolol XL 25 mg daily, uptitrate as needed to keep resting heart rates under 100  -- Utilize [employer] twyla for rhythm monitoring     She has a [id number] score of 9 for age >75, female gender, HTN, HF, CAD, DM 2, TIA.  She also has an increased risk for bleed due to gait instability and history of SAH.  HAS-BLED score 3 for age, TIA, bleeding issue.  She may be a candidate for left atrial appendage closure.    -- Continue Eliquis 2.5 mg twice daily for stroke prophylaxis (age >80, weight <60 kg).     2.  Acute heart failure with preserved ejection fraction: Secondary to RVR.  No fluid retention on exam or heart failure symptoms.        3.  Hypertension: Controlled    Follow-up in October, 1 year post ablation     History of Present Illness/Subjective    HPI: Adilia Eric is a 84 year old female who comes in today accompanied by her daughter for EP follow up of atrial fibrillation.  She has a history of paroxysmal atrial fibrillation, hypertension, mild nonobstructive coronary artery disease on angiography in 2013, hyponatremia, type 2 diabetes, remote history of possible TIA,  "intracranial hemorrhage 2011 without recurrence, abdominal aortic aneurysm (3.3 cm), possible obstructive sleep apnea, left occipital neuralgia s/p decompression 3/14/2025.  She has had near complete resolution of her occipital nerve pain.  She has broken out with a rash along the dermatomal distribution due to shingles.  Neurosurgery notes from yesterday reviewed.    Arrhythmia history  Dx/date: PAF 1/2023.  Recurrent AF-RVR 2/5/2024 associated with acute HF.  Rates did not slow with metoprolol, so she underwent urgent cardioversion.  Sx: Palpitations (\"fluttering\"), fatigue, progressive shortness of breath, acute HFpEF  [id number] score: 9 for age >75, female gender, HTN, HF, CAD, DM 2, TIA  HAS-BLED score 3 for age, TIA, bleeding issue.  Oral anticoagulation: Eliquis 2.5 mg twice daily, dose adjusted for age >80 and weight <60 kg  Antiarrhythmic medications, AV caitlyn blocking agents: Sotalol 80 mg twice daily, side effects-SOB (2/10/2024-present)  Procedures  DCCV: 2/5/2024 (ED)  Ablation: 10/14/2024 by Dr. Max (RF PVI)     Adilia states that she is feeling a bit better, but heart rates have been elevated.  She has been documenting them with her Kardia device.  Blood pressures have been 130s/80s.  She denies chest discomfort, palpitations, significant peripheral edema, shortness of breath at rest, paroxysmal nocturnal dyspnea, orthopnea, lightheadedness, dizziness, pre-syncope, or syncope.     Cardiographics (EKGs, Kardia strips personally reviewed):  EKG done 4/16/2025 shows sinus rhythm at 91 bpm, QRS 70 ms, QT/QTc 354/435 ms  3/14/2015 rhythm strip showed sinus rhythm at 95 bpm  EKG done 2/13/2025 shows sinus rhythm at 86 bpm, QRS 60 ms, QT/QTc 350/418 ms  EKG done 10/14/2024 shows sinus tachycardia at 138 bpm, QRS 66 ms, QT/QTc 354/536 ms  EKG done 10/4/2024 shows sinus rhythm at 72 bpm, PACs, QRS 70 ms, QT/QTc 386/422 ms  EKG done 4/10/2024 shows sinus rhythm at 98 bpm, QRS 70 ms, QT/QTc 326/460 " ms  EKG done 2/5/2024 shows atrial fibrillation with rapid ventricular response at 118 bpm.  Subsequent EKG shows sinus rhythm at 81 bpm, QRS 66 ms, QT/QTc 348/404 ms  EKG done 1/20/2023 shows atrial fibrillation with rapid ventricular response at 126 bpm     Kardia ECG strips show sinus tachycardia [id number], no A-fib    ECHO done 2/17/2023:  Left ventricular size, wall motion and function are normal. The ejection  fraction is 55-60%.  Normal right ventricle size and systolic function.  Both atria are of normal size.  No hemodynamically significant valvular abnormalities on 2D or color flow  imaging.  There is no comparison study available.     NM stress test done 5/18/2022:     Lexiscan stress ECG negative for ischemia.     The nuclear stress test is negative for inducible myocardial ischemia or infarction.     The left ventricular ejection fraction at stress is greater than 70%.     A prior study was conducted on 4/16/2019.  No interval change.    I have reviewed and updated the patient's Past Medical History, Social History, Family History and Medication List.  Outside records personally reviewed.     Physical Examination  Review of Systems   Vitals: /70 (BP Location: Left arm)   Pulse 91   Resp 14   Ht 1.524 m (5')   Wt 56.3 kg (124 lb 3.2 oz)   SpO2 94%   BMI 24.26 kg/m    BMI= Body mass index is 24.26 kg/m .  Wt Readings from Last 3 Encounters:   04/16/25 56.3 kg (124 lb 3.2 oz)   02/13/25 55.3 kg (122 lb)   11/25/24 55.3 kg (122 lb)       General Appearance:   Alert, well-appearing and in no acute distress.   HEENT: Atraumatic, normocephalic.  No scleral icterus, normal conjunctivae, EOMs intact, PERRL.  Mucous membranes pink and moist.     Chest/Lungs:   Chest symmetric, spine straight.  Respirations unlabored.  Lungs are clear to auscultation.   Cardiovascular:   Regular rate and rhythm.  Normal first and second heart sounds with no murmurs, rubs, or gallops; radial pulses are intact, No  edema.   Abdomen:  Soft, nondistended.   Extremities: No cyanosis or clubbing.   Musculoskeletal: Moves all extremities.     Skin: Warm, dry, intact.    Neurologic: Mood and affect are appropriate.  Alert and oriented to person, place, time, and situation.     ROS: 10 point ROS neg other than the symptoms noted above in the HPI.         Medical History  Surgical History Family History Social History   Past Medical History:   Diagnosis Date     Abdominal aortic aneurysm (AAA) without rupture, unspecified part 04/10/2024     Acute heart failure with preserved ejection fraction (H) 02/07/2024     Depression      Diabetes mellitus (H)      GERD (gastroesophageal reflux disease)      Hyperlipemia      Hypertension      Intracranial hemorrhage (H) 01/01/2011    may be amyloid angiopathy; has been stable.     Mastoiditis 01/01/2011     Near syncope 07/26/2020     Osteoarthritis      Paroxysmal atrial fibrillation (H) 04/21/2023     TMJ (temporomandibular joint disorder)      Past Surgical History:   Procedure Laterality Date     CYST REMOVAL      tongue     EP ABLATION PULMONARY VEIN ISOLATION N/A 10/14/2024    Procedure: Ablation Atrial Fibrillation;  Surgeon: Gokul Max MD;  Location: [facility] CV     HYSTERECTOMY  01/01/1996     JOINT REPLACEMENT Right 2017     OOPHORECTOMY Bilateral 01/01/1996     Family History   Problem Relation Age of Onset     Heart Disease Father      No Known Problems Mother      No Known Problems Sister      No Known Problems Daughter      No Known Problems Maternal Grandmother      No Known Problems Maternal Grandfather      No Known Problems Paternal Grandmother      No Known Problems Paternal Grandfather      No Known Problems Maternal Aunt      No Known Problems Paternal Aunt      Hereditary Breast and Ovarian Cancer Syndrome No family hx of      Breast Cancer No family hx of      Cancer No family hx of      Colon Cancer No family hx of      Endometrial Cancer No family hx  of      Ovarian Cancer No family hx of         Social History     Socioeconomic History     Marital status:      Spouse name: Not on file     Number of children: Not on file     Years of education: Not on file     Highest education level: Not on file   Occupational History     Not on file   Tobacco Use     Smoking status: Former     Smokeless tobacco: Never   Substance and Sexual Activity     Alcohol use: Not Currently     Alcohol/week: 1.7 standard drinks of alcohol     Types: 2 Standard drinks or equivalent per week     Drug use: No     Sexual activity: Not on file   Other Topics Concern     Not on file   Social History Narrative     Not on file     Social Drivers of Health     Financial Resource Strain: Low Risk  (12/29/2023)    [facility]    Financial Resource Strain      Difficulty of Paying Living Expenses: 3      Difficulty of Paying Living Expenses: Not on file   Food Insecurity: No Food Insecurity (12/29/2023)    [facility]    Food Insecurity      Do you worry your food will run out before you are able to buy more?: 1   Transportation Needs: No Transportation Needs (12/29/2023)    [facility] from [facility], [facility]    Transportation Needs      Does lack of transportation keep you from medical appointments?: 1      Does lack of transportation keep you from work, meetings or getting things that you need?: 1   Physical Activity: Not on file   Stress: Not on file   Social Connections: Socially Integrated (12/29/2023)    Received from [facility], [facility]    Social Connections      Do you often feel lonely or isolated from those around you?: 0   Interpersonal Safety: Low Risk  (10/14/2024)    Interpersonal Safety      Do you feel physically and emotionally safe where you currently live?: Yes      Within the past 12  months, have you been hit, slapped, kicked or otherwise physically hurt by someone?: No      Within the past 12 months, have you been humiliated or emotionally abused in other ways by your partner or ex-partner?: No   Housing Stability: Low Risk  (12/29/2023)    [facility]    Housing Stability      What is your housing situation today?: 1           Medications  Allergies   Current Outpatient Medications   Medication Sig Dispense Refill     acetaminophen (TYLENOL) 500 MG tablet Take 1,000 mg by mouth 2 times daily as needed       alendronate (FOSAMAX) 70 MG tablet Take 70 mg by mouth every 7 days       allopurinol (ZYLOPRIM) 100 MG tablet Take 100 mg by mouth daily       amLODIPine (NORVASC) 5 MG tablet Take 10 mg by mouth daily at 2 pm.       apixaban ANTICOAGULANT (ELIQUIS ANTICOAGULANT) 2.5 MG tablet Take 1 tablet (2.5 mg) by mouth 2 times daily 180 tablet 2     atorvastatin (LIPITOR) 40 MG tablet [ATORVASTATIN (LIPITOR) 40 MG TABLET] Take 40 mg by mouth bedtime.       baclofen (LIORESAL) 10 MG tablet [BACLOFEN (LIORESAL) 10 MG TABLET] Take 10 mg by mouth at bedtime.        calcium carbonate (OS-MARCELO) 600 mg calcium (1,500 mg) tablet [CALCIUM CARBONATE (OS-MARCELO) 600 MG CALCIUM (1,500 MG) TABLET] Take 1 tablet by mouth daily.        celecoxib (CELEBREX) 200 MG capsule [CELECOXIB (CELEBREX) 200 MG CAPSULE] Take 200 mg by mouth daily.       cetirizine (ZYRTEC) 10 MG tablet Take 10 mg by mouth as needed.       CONTOUR NEXT TEST test strip 2 times daily       cyanocobalamin 1000 MCG tablet [CYANOCOBALAMIN 1000 MCG TABLET] Take 1,000 mcg by mouth daily.       Doxycycline Hyclate 100 MG TBEC Take 100 mg by mouth 2 times daily.       DULoxetine (CYMBALTA) 20 MG capsule Take 20 mg by mouth daily.       hydrOXYzine (ATARAX) 25 MG tablet TAKE 1 TABLET BY MOUTH UP TO 3 TIMES DAILY AS NEEDED FOR ANXIETY       hydrOXYzine osmel (VISTARIL) 25 MG capsule Take 25 mg by mouth every 4  hours as needed for other (Pain/Muscle Spasms.).       lactobacillus combo no.11 15 billion cell CpSP Take 1 capsule by mouth daily       lisinopril (ZESTRIL) 20 MG tablet TAKE 1 TABLET BY MOUTH TWICE A DAY FOR BLOOD PRESSURE       LORazepam (ATIVAN) 0.5 MG tablet Take 0.25 mg by mouth daily.       magnesium oxide (MAG-OX) 400 mg tablet [MAGNESIUM OXIDE (MAG-OX) 400 MG TABLET] Take 400 mg by mouth daily.       metoprolol succinate ER (TOPROL XL) 25 MG 24 hr tablet Take 1 tablet (25 mg) by mouth daily. 90 tablet 3     Microlet Lancets MISC TEST BLOOD SUGAR 2 X'S DAILY DX: E11.9       nystatin (MYCOSTATIN) 700704 UNIT/ML suspension Take 500,000 Units by mouth 4 times daily.       vitamin D3 (CHOLECALCIFEROL) 50 mcg (2000 units) tablet Take 1 tablet by mouth daily.       vitamin D3 (CHOLECALCIFEROL) 50 mcg (2000 units) tablet Take 1 tablet by mouth daily       methocarbamol (ROBAXIN) 500 MG tablet Take 250 mg by mouth 4 times daily as needed for muscle spasms. (Patient not taking: Reported on 4/16/2025)         Allergies   Allergen Reactions     Seasonal Allergies         Denies previous adverse reaction to anesthesia      Lab Results    Chemistry/lipid CBC Cardiac Enzymes/BNP/TSH/INR   Recent Labs   Lab Test 05/11/23  1000 05/16/22  0950   CHOL 117 115   HDL 51 44*   LDL 43 50   TRIG 113 106      Recent Labs   Lab Test 10/14/24  0611 10/04/24  0915   * 129*   POTASSIUM 3.5 4.3   CHLORIDE 98 94*   CO2 23 23   ANIONGAP 13 12   * 123*   BUN 8.9 9.5   CR 0.58 0.53   MARCELO 9.9 9.6      CBC RESULTS:   Recent Labs   Lab Test 10/14/24  0611   WBC 10.7   RBC 4.61   HGB 14.3   HCT 42.4   MCV 92   MCH 31.0   MCHC 33.7   RDW 13.3           TSH   Date Value Ref Range Status   04/10/2024 0.65 0.30 - 4.20 uIU/mL Final   12/15/2021 0.64 0.30 - 5.00 uIU/mL Final          53 minutes were spent on the date of encounter performing chart review, history and exam, documentation, and further activities as noted  above.    The longitudinal plan of care for the diagnosis(es)/condition(s) as documented were addressed during this visit. Due to the added complexity in care, I will continue to support Adilia in the subsequent management and with ongoing continuity of care.                                           Thank you for allowing me to participate in the care of your patient.      Sincerely,     PARVIN Gunn CNP     [facility]  cc:   PARVIN Gunn CNP  [address], [address]  [place] [phone]

## 2025-04-17 LAB
ATRIAL RATE - MUSE: 91 BPM
DIASTOLIC BLOOD PRESSURE - MUSE: NORMAL MMHG
INTERPRETATION ECG - MUSE: NORMAL
P AXIS - MUSE: 65 DEGREES
PR INTERVAL - MUSE: 150 MS
QRS DURATION - MUSE: 70 MS
QT - MUSE: 354 MS
QTC - MUSE: 435 MS
R AXIS - MUSE: -29 DEGREES
SYSTOLIC BLOOD PRESSURE - MUSE: NORMAL MMHG
T AXIS - MUSE: 39 DEGREES
VENTRICULAR RATE- MUSE: 91 BPM

## 2025-04-17 PROCEDURE — 93000 ELECTROCARDIOGRAM COMPLETE: CPT | Performed by: INTERNAL MEDICINE

## 2025-06-29 ENCOUNTER — HEALTH MAINTENANCE LETTER (OUTPATIENT)
Age: 85
End: 2025-06-29

## (undated) DEVICE — TUBE SET SMARKABLATE IRRIGATION

## (undated) DEVICE — INTRODUCER SHEATH VASC CATH 8.5FR CARTO GIDE STH MED D138502

## (undated) DEVICE — SHEATH PINNACLE 9/25/038 RSS905

## (undated) DEVICE — CATHETER OCTARAY STANDARD SPLINE CURVE F 2-2-2-2-2 D160904

## (undated) DEVICE — ELECTRODE DEFIB CADENCE 22550R

## (undated) DEVICE — CUSTOM PACK EP

## (undated) DEVICE — CATH TRANSSEPTAL VERSACROSS 8.5FR 180-J RF 63CM 45DEG

## (undated) DEVICE — INTRO TERUMO 8FRX25CM W/MARKER RSB803

## (undated) DEVICE — TRANSDUCER TRAY ARTERIAL 42646-06

## (undated) DEVICE — CATH EP 7FR X 115CM DECANAV CA

## (undated) DEVICE — PATCH CARTO 3 EXTERNAL REFERENCE 3D MAPPING CREFP6

## (undated) DEVICE — INTRO MICRO MINI STICK 4FR STIFF NITINOL 45-753

## (undated) DEVICE — PROBE TEMP CIRCA S-CATH M ESPH 12-SNSR 3D MAP CS-21EP

## (undated) DEVICE — 8F SOUNDSTAR ECO ULTRASOUND CATHETER

## (undated) DEVICE — CATH ABLATION 8FR 115CM D-F CURVE BI-DIR QDOT MICRO D139505

## (undated) RX ORDER — PROTAMINE SULFATE 10 MG/ML
INJECTION, SOLUTION INTRAVENOUS
Status: DISPENSED
Start: 2024-10-14

## (undated) RX ORDER — PROPOFOL 10 MG/ML
INJECTION, EMULSION INTRAVENOUS
Status: DISPENSED
Start: 2024-10-14

## (undated) RX ORDER — HEPARIN SODIUM 10000 [USP'U]/100ML
INJECTION, SOLUTION INTRAVENOUS
Status: DISPENSED
Start: 2024-10-14

## (undated) RX ORDER — ONDANSETRON 2 MG/ML
INJECTION INTRAMUSCULAR; INTRAVENOUS
Status: DISPENSED
Start: 2024-10-14

## (undated) RX ORDER — PHENYLEPHRINE HYDROCHLORIDE 10 MG/ML
INJECTION INTRAVENOUS
Status: DISPENSED
Start: 2024-10-14

## (undated) RX ORDER — DEXAMETHASONE SODIUM PHOSPHATE 10 MG/ML
INJECTION, SOLUTION INTRAMUSCULAR; INTRAVENOUS
Status: DISPENSED
Start: 2024-10-14